# Patient Record
Sex: MALE | Race: WHITE | NOT HISPANIC OR LATINO | Employment: FULL TIME | URBAN - METROPOLITAN AREA
[De-identification: names, ages, dates, MRNs, and addresses within clinical notes are randomized per-mention and may not be internally consistent; named-entity substitution may affect disease eponyms.]

---

## 2017-09-28 ENCOUNTER — OFFICE VISIT (OUTPATIENT)
Dept: FAMILY MEDICINE | Facility: CLINIC | Age: 57
End: 2017-09-28
Payer: COMMERCIAL

## 2017-09-28 VITALS
HEART RATE: 64 BPM | SYSTOLIC BLOOD PRESSURE: 114 MMHG | OXYGEN SATURATION: 97 % | HEIGHT: 72 IN | RESPIRATION RATE: 18 BRPM | WEIGHT: 224 LBS | BODY MASS INDEX: 30.34 KG/M2 | TEMPERATURE: 98 F | DIASTOLIC BLOOD PRESSURE: 79 MMHG

## 2017-09-28 DIAGNOSIS — R06.83 SNORING: ICD-10-CM

## 2017-09-28 DIAGNOSIS — E78.5 HYPERLIPIDEMIA LDL GOAL <130: ICD-10-CM

## 2017-09-28 DIAGNOSIS — N52.9 MALE ERECTILE DISORDER: ICD-10-CM

## 2017-09-28 DIAGNOSIS — Z11.59 NEED FOR HEPATITIS C SCREENING TEST: ICD-10-CM

## 2017-09-28 DIAGNOSIS — Z23 NEED FOR VACCINATION: ICD-10-CM

## 2017-09-28 DIAGNOSIS — Z12.5 SCREENING FOR PROSTATE CANCER: ICD-10-CM

## 2017-09-28 DIAGNOSIS — Z23 NEED FOR PROPHYLACTIC VACCINATION AND INOCULATION AGAINST INFLUENZA: ICD-10-CM

## 2017-09-28 DIAGNOSIS — Z00.00 ROUTINE GENERAL MEDICAL EXAMINATION AT A HEALTH CARE FACILITY: Primary | ICD-10-CM

## 2017-09-28 LAB
ALBUMIN SERPL-MCNC: 4.2 G/DL (ref 3.4–5)
ALP SERPL-CCNC: 84 U/L (ref 40–150)
ALT SERPL W P-5'-P-CCNC: 41 U/L (ref 0–70)
ANION GAP SERPL CALCULATED.3IONS-SCNC: 7 MMOL/L (ref 3–14)
AST SERPL W P-5'-P-CCNC: 21 U/L (ref 0–45)
BILIRUB SERPL-MCNC: 1.7 MG/DL (ref 0.2–1.3)
BUN SERPL-MCNC: 12 MG/DL (ref 7–30)
CALCIUM SERPL-MCNC: 9.3 MG/DL (ref 8.5–10.1)
CHLORIDE SERPL-SCNC: 104 MMOL/L (ref 94–109)
CHOLEST SERPL-MCNC: 254 MG/DL
CO2 SERPL-SCNC: 27 MMOL/L (ref 20–32)
CREAT SERPL-MCNC: 0.88 MG/DL (ref 0.66–1.25)
ERYTHROCYTE [DISTWIDTH] IN BLOOD BY AUTOMATED COUNT: 13.1 % (ref 10–15)
GFR SERPL CREATININE-BSD FRML MDRD: 88 ML/MIN/1.7M2
GLUCOSE SERPL-MCNC: 103 MG/DL (ref 70–99)
HCT VFR BLD AUTO: 47.2 % (ref 40–53)
HDLC SERPL-MCNC: 49 MG/DL
HGB BLD-MCNC: 16.3 G/DL (ref 13.3–17.7)
LDLC SERPL CALC-MCNC: 186 MG/DL
MCH RBC QN AUTO: 31.7 PG (ref 26.5–33)
MCHC RBC AUTO-ENTMCNC: 34.5 G/DL (ref 31.5–36.5)
MCV RBC AUTO: 92 FL (ref 78–100)
NONHDLC SERPL-MCNC: 205 MG/DL
PLATELET # BLD AUTO: 234 10E9/L (ref 150–450)
POTASSIUM SERPL-SCNC: 4.4 MMOL/L (ref 3.4–5.3)
PROT SERPL-MCNC: 7.4 G/DL (ref 6.8–8.8)
PSA SERPL-ACNC: 0.86 UG/L (ref 0–4)
RBC # BLD AUTO: 5.15 10E12/L (ref 4.4–5.9)
SODIUM SERPL-SCNC: 138 MMOL/L (ref 133–144)
TRIGL SERPL-MCNC: 96 MG/DL
WBC # BLD AUTO: 6 10E9/L (ref 4–11)

## 2017-09-28 PROCEDURE — 85027 COMPLETE CBC AUTOMATED: CPT | Performed by: INTERNAL MEDICINE

## 2017-09-28 PROCEDURE — 99386 PREV VISIT NEW AGE 40-64: CPT | Performed by: INTERNAL MEDICINE

## 2017-09-28 PROCEDURE — 36415 COLL VENOUS BLD VENIPUNCTURE: CPT | Performed by: INTERNAL MEDICINE

## 2017-09-28 PROCEDURE — 80061 LIPID PANEL: CPT | Performed by: INTERNAL MEDICINE

## 2017-09-28 PROCEDURE — 80053 COMPREHEN METABOLIC PANEL: CPT | Performed by: INTERNAL MEDICINE

## 2017-09-28 PROCEDURE — 90686 IIV4 VACC NO PRSV 0.5 ML IM: CPT | Performed by: INTERNAL MEDICINE

## 2017-09-28 PROCEDURE — G0103 PSA SCREENING: HCPCS | Performed by: INTERNAL MEDICINE

## 2017-09-28 PROCEDURE — 86803 HEPATITIS C AB TEST: CPT | Performed by: INTERNAL MEDICINE

## 2017-09-28 PROCEDURE — 90472 IMMUNIZATION ADMIN EACH ADD: CPT | Performed by: INTERNAL MEDICINE

## 2017-09-28 PROCEDURE — 90471 IMMUNIZATION ADMIN: CPT | Performed by: INTERNAL MEDICINE

## 2017-09-28 PROCEDURE — 90714 TD VACC NO PRESV 7 YRS+ IM: CPT | Performed by: INTERNAL MEDICINE

## 2017-09-28 RX ORDER — SILDENAFIL CITRATE 20 MG/1
20 TABLET ORAL DAILY PRN
Qty: 12 TABLET | Refills: 11 | Status: SHIPPED | OUTPATIENT
Start: 2017-09-28 | End: 2017-12-14

## 2017-09-28 NOTE — MR AVS SNAPSHOT
After Visit Summary   9/28/2017    Darrell Regalado    MRN: [id number]           Patient Information     Date Of Birth          1960        Visit Information        Provider Department      9/28/2017 9:30 AM Mike Mathur MD [facility]        Today's Diagnoses     Routine general medical examination at a health care facility    -  1    Male erectile disorder        Snoring        Need for prophylactic vaccination and inoculation against influenza        Need for hepatitis C screening test        Screening for prostate cancer          Care Instructions      Preventive Health Recommendations  Male Ages 50 - 64    Yearly exam:             See your health care provider every year in order to  o   Review health changes.   o   Discuss preventive care.    o   Review your medicines if your doctor has prescribed any.     Have a cholesterol test every 5 years, or more frequently if you are at risk for high cholesterol/heart disease.     Have a diabetes test (fasting glucose) every three years. If you are at risk for diabetes, you should have this test more often.     Have a colonoscopy at age 50, or have a yearly FIT test (stool test). These exams will check for colon cancer.      Talk with your health care provider about whether or not a prostate cancer screening test (PSA) is right for you.    You should be tested each year for STDs (sexually transmitted diseases), if you re at risk.     Shots: Get a flu shot each year. Get a tetanus shot every 10 years.     Nutrition:    Eat at least 5 servings of fruits and vegetables daily.     Eat whole-grain bread, whole-wheat pasta and brown rice instead of white grains and rice.     Talk to your provider about Calcium and Vitamin D.     Lifestyle    Exercise for at least 150 minutes a week (30 minutes a day, 5 days a week). This will help you control your weight and prevent disease.     Limit alcohol to one drink per day.     No smoking.     Wear  sunscreen to prevent skin cancer.     See your dentist every six months for an exam and cleaning.     See your eye doctor every 1 to 2 years.            Follow-ups after your visit        Additional Services     SLEEP EVALUATION & MANAGEMENT REFERRAL - ADULT       Please be aware that coverage of these services is subject to the terms and limitations of your health insurance plan.  Call member services at your health plan with any benefit or coverage questions.      Please bring the following to your appointment:    >>   List of current medications   >>   This referral request   >>   Any documents/labs given to you for this referral    [facility] [phone] (Age 18 and up)                  Follow-up notes from your care team     Return in about 1 year (around 9/28/2018) for Physical Exam.      Future tests that were ordered for you today     Open Future Orders        Priority Expected Expires Ordered    SLEEP EVALUATION & MANAGEMENT REFERRAL - ADULT Routine  9/28/2018 9/28/2017            Who to contact     If you have questions or need follow up information about today's clinic visit or your schedule please contact [facility] directly at [phone].  Normal or non-critical lab and imaging results will be communicated to you by [employer], letter or phone within 4 business days after the clinic has received the results. If you do not hear from us within 7 days, please contact the clinic through [employer] or phone. If you have a critical or abnormal lab result, we will notify you by phone as soon as possible.  Submit refill requests through [employer] or call your pharmacy and they will forward the refill request to us. Please allow 3 business days for your refill to be completed.          Additional Information About Your Visit        [employer] Information     [employer] lets you send messages to your doctor, view your test results, renew your prescriptions, schedule appointments and more. To  "sign up, go to [email or web address] . Click on \"Log in\" on the left side of the screen, which will take you to the Welcome page. Then click on \"Sign up Now\" on the right side of the page.     You will be asked to enter the access code listed below, as well as some personal information. Please follow the directions to create your username and password.     Your access code is: [email or web address]  Expires: 2017 10:04 AM     Your access code will  in 90 days. If you need help or a new code, please call your [place] clinic or [phone].        Care EveryWhere ID     This is your Care EveryWhere ID. This could be used by other organizations to access your [facility] medical records  [id number]        Your Vitals Were     Pulse Temperature Respirations Height Pulse Oximetry BMI (Body Mass Index)    64 98  F (36.7  C) (Oral) 18 6' (1.829 m) 97% 30.38 kg/m2       Blood Pressure from Last 3 Encounters:   17 114/79   05/07/15 110/68   14 120/76    Weight from Last 3 Encounters:   17 224 lb (101.6 kg)   05/07/15 209 lb (94.8 kg)   14 233 lb (105.7 kg)              We Performed the Following     CBC with platelets     Comprehensive metabolic panel     FLU VAC, SPLIT VIRUS IM > 3 YO (QUADRIVALENT) [80982]     Hepatitis C Screen Reflex to HCV RNA Quant and Genotype     Lipid panel reflex to direct LDL     Prostate spec antigen screen     Vaccine Administration, Initial [70869]          Today's Medication Changes          These changes are accurate as of: 17 10:04 AM.  If you have any questions, ask your nurse or doctor.               Start taking these medicines.        Dose/Directions    sildenafil 20 MG tablet   Commonly known as:  REVATIO   Used for:  Male erectile disorder   Started by:  Mike Mathur MD        Dose:  20 mg   Take 1 tablet (20 mg) by mouth daily as needed   Quantity:  12 tablet   Refills:  11         Stop taking these medicines if you haven't already. Please " contact your care team if you have questions.     sildenafil 50 MG tablet   Commonly known as:  VIAGRA   Stopped by:  Mike Mathur MD                Where to get your medicines      Some of these will need a paper prescription and others can be bought over the counter.  Ask your nurse if you have questions.     Bring a paper prescription for each of these medications     sildenafil 20 MG tablet                Primary Care Provider Office Phone # Fax #    Mike Mathur [phone]       [facility] [address] [place] [address] [address]  [facility] [address]        Equal Access to Services     [facility]: Hadii aad ku hadasho Soomaali, waaxda luqadaha, qaybta kaalmada adeegyada, waxay idiin hayaan ademeliza moore . So [facility] [phone].    ATENCIÓN: Si habla español, tiene a cartagena disposición servicios gratuitos de asistencia lingüística. [facility] [phone].    We comply with applicable federal civil rights laws and [place] laws. We do not discriminate on the basis of race, color, national origin, age, disability sex, sexual orientation or gender identity.            Thank you!     Thank you for choosing [facility]  for your care. Our goal is always to provide you with excellent care. Hearing back from our patients is one way we can continue to improve our services. Please take a few minutes to complete the written survey that you may receive in the mail after your visit with us. Thank you!             Your Updated Medication List - Protect others around you: Learn how to safely use, store and throw away your medicines at www.disposemymeds.org.          This list is accurate as of: 9/28/17 10:04 AM.  Always use your most recent med list.                   Brand Name Dispense Instructions for use Diagnosis    sildenafil 20 MG tablet    REVATIO    12 tablet    Take 1 tablet (20 mg) by mouth daily as needed    Male erectile disorder

## 2017-09-28 NOTE — PROGRESS NOTES
SUBJECTIVE:   CC: Darrell Regalado is an 57 year old male who presents for preventative health visit.     Healthy Habits:    Do you get at least three servings of calcium containing foods daily (dairy, green leafy vegetables, etc.)? yes    Amount of exercise or daily activities, outside of work: walking    Problems taking medications regularly No    Medication side effects: No    Have you had an eye exam in the past two years? yes    Do you see a dentist twice per year? yes    Do you have sleep apnea, excessive snoring or daytime drowsiness?YES-Excessive snoring          Today's PHQ-2 Score: PHQ-2 ( 1999 Pfizer) 9/28/2017 5/7/2015   Q1: Little interest or pleasure in doing things 0 0   Q2: Feeling down, depressed or hopeless 0 0   PHQ-2 Score 0 0   Q1: Little interest or pleasure in doing things - Not at all   Q2: Feeling down, depressed or hopeless - Not at all   PHQ-2 Score - 0       Abuse: Current or Past(Physical, Sexual or Emotional)- No  Do you feel safe in your environment - Yes    Social History   Substance Use Topics     Smoking status: Never Smoker     Smokeless tobacco: Never Used     Alcohol use No     The patient does not drink >3 drinks per day nor >7 drinks per week.    Last PSA:   PSA   Date Value Ref Range Status   05/07/2015 0.68 0 - 4 ug/L Final       Reviewed orders with patient. Reviewed health maintenance and updated orders accordingly - Yes  Patient Active Problem List   Diagnosis     Other unspecified back disorder     Lumbago     Nonallopathic lesion of sacral region     CARDIOVASCULAR SCREENING; LDL GOAL LESS THAN 160     Past Surgical History:   Procedure Laterality Date     ORTHOPEDIC SURGERY      finger fracture repair        Social History   Substance Use Topics     Smoking status: Never Smoker     Smokeless tobacco: Never Used     Alcohol use No      Comment: sober since 2006     Family History   Problem Relation Age of Onset     Hypertension Mother      Breast Cancer Mother       DIABETES Father      Cancer - colorectal No family hx of      Prostate Cancer No family hx of          Current Outpatient Prescriptions   Medication Sig Dispense Refill     sildenafil (REVATIO) 20 MG tablet Take 1 tablet (20 mg) by mouth daily as needed 12 tablet 11     No Known Allergies      Reviewed and updated as needed this visit by clinical staffTobacco  Allergies  Meds  Med Hx  Surg Hx  Fam Hx  Soc Hx        Reviewed and updated as needed this visit by Provider        Past Medical History:   Diagnosis Date     Alcohol abuse, unspecified     stopped drinking 2006     Other unspecified back disorder     low back pain      Past Surgical History:   Procedure Laterality Date     ORTHOPEDIC SURGERY      finger fracture repair        ROS:  A 12 organ systems ROS is negative    OBJECTIVE:   /79 (BP Location: Right arm, Patient Position: Chair, Cuff Size: Adult Large)  Pulse 64  Temp 98  F (36.7  C) (Oral)  Resp 18  Ht 6' (1.829 m)  Wt 224 lb (101.6 kg)  SpO2 97%  BMI 30.38 kg/m2  EXAM:  GENERAL: healthy, alert and no distress  EYES: Eyes grossly normal to inspection, PERRL and conjunctivae and sclerae normal  HENT: ear canals and TM's normal, nose and mouth without ulcers or lesions  NECK: no adenopathy, no asymmetry, masses, or scars and thyroid normal to palpation  RESP: lungs clear to auscultation - no rales, rhonchi or wheezes  CV: regular rate and rhythm, normal S1 S2, no S3 or S4, no murmur, click or rub, no peripheral edema and peripheral pulses strong  ABDOMEN: soft, nontender, no hepatosplenomegaly, no masses and bowel sounds normal  RECTAL: normal sphincter tone, no rectal masses, prostate normal size, smooth, nontender without nodules or masses  MS: no gross musculoskeletal defects noted, no edema  SKIN: no suspicious lesions or rashes  NEURO: Normal strength and tone, mentation intact and speech normal  PSYCH: mentation appears normal, affect normal/bright    ASSESSMENT/PLAN:   1.  Routine general medical examination at a health care facility    - Comprehensive metabolic panel  - CBC with platelets  - Lipid panel reflex to direct LDL    2. Male erectile disorder    - sildenafil (REVATIO) 20 MG tablet; Take 1 tablet (20 mg) by mouth daily as needed  Dispense: 12 tablet; Refill: 11    3. Snoring    - SLEEP EVALUATION & MANAGEMENT REFERRAL - ADULT; Future    4. Need for prophylactic vaccination and inoculation against influenza  Flu shot today   - FLU VAC, SPLIT VIRUS IM > 3 YO (QUADRIVALENT) [35662]  - Vaccine Administration, Initial [82655]    5. Need for hepatitis C screening test    - Hepatitis C Screen Reflex to HCV RNA Quant and Genotype    6. Screening for prostate cancer    - Prostate spec antigen screen    COUNSELING:  Reviewed preventive health counseling, as reflected in patient instructions  Special attention given to:        Regular exercise       Healthy diet/nutrition       Immunizations    Vaccinated for: Influenza and Td           Consider Hep C screening for patients born between 1945 and 1965       Colon cancer screening       Prostate cancer screening             reports that he has never smoked. He has never used smokeless tobacco.      Estimated body mass index is 30.38 kg/(m^2) as calculated from the following:    Height as of this encounter: 6' (1.829 m).    Weight as of this encounter: 224 lb (101.6 kg).   Weight management plan: Discussed healthy diet and exercise guidelines and patient will follow up in 12 months in clinic to re-evaluate.    Counseling Resources:  ATP IV Guidelines  Pooled Cohorts Equation Calculator  FRAX Risk Assessment  ICSI Preventive Guidelines  Dietary Guidelines for Americans, 2010  USDA's MyPlate  ASA Prophylaxis  Lung CA Screening    Mike Mathur MD  [facility]  Injectable Influenza Immunization Documentation    1.  Is the person to be vaccinated sick today?   No    2. Does the person to be vaccinated have an allergy to a  component   of the vaccine?   No    3. Has the person to be vaccinated ever had a serious reaction   to influenza vaccine in the past?   No    4. Has the person to be vaccinated ever had Guillain-Barré syndrome?   No    Form completed by Patient

## 2017-09-28 NOTE — NURSING NOTE
Screening Questionnaire for Adult Immunization    Are you sick today?   No   Do you have allergies to medications, food, a vaccine component or latex?   No   Have you ever had a serious reaction after receiving a vaccination?   No   Do you have a long-term health problem with heart disease, lung disease, asthma, kidney disease, metabolic disease (e.g. diabetes), anemia, or other blood disorder?   No   Do you have cancer, leukemia, HIV/AIDS, or any other immune system problem?   No   In the past 3 months, have you taken medications that affect  your immune system, such as prednisone, other steroids, or anticancer drugs; drugs for the treatment of rheumatoid arthritis, Crohn s disease, or psoriasis; or have you had radiation treatments?   No   Have you had a seizure, or a brain or other nervous system problem?   No   During the past year, have you received a transfusion of blood or blood     products, or been given immune (gamma) globulin or antiviral drug?   No   For women: Are you pregnant or is there a chance you could become        pregnant during the next month?   No   Have you received any vaccinations in the past 4 weeks?   No     Immunization questionnaire answers were all negative.        Per orders of Dr. Mathur , injection of TD given by Jovanna Amin. Patient instructed to remain in clinic for 15 minutes afterwards, and to report any adverse reaction to me immediately.       Screening performed by Jovanna Amin on 9/28/2017 at 10:31 AM.    Prior to injection verified patient identity using patient's name and date of birth.    Chief Complaint   Patient presents with     Physical     Fasting       Initial /79 (BP Location: Right arm, Patient Position: Chair, Cuff Size: Adult Large)  Pulse 64  Temp 98  F (36.7  C) (Oral)  Resp 18  Ht 6' (1.829 m)  Wt 224 lb (101.6 kg)  SpO2 97%  BMI 30.38 kg/m2 Estimated body mass index is 30.38 kg/(m^2) as calculated from the following:    Height as of  this encounter: 6' (1.829 m).    Weight as of this encounter: 224 lb (101.6 kg).  Medication Reconciliation: complete   Jovanna Amin CMA (AAMA)

## 2017-09-28 NOTE — LETTER
"[facility]  [address] [facility]  [address]  [place]  [address]  Tel: [phone]    October 2, 2017    Darrell Regalado  [address] [facility] [address]        Dear Mr. Regalado,    -Your hepatitis C screening test is negative.  You do not have hepatitis C.     -Liver and gallbladder tests are normal for you. (ALT,AST, Alk phos, bilirubin(mild bilirubin elevation is a normal finding in many healthy fasting patients), kidney function is normal for you (Creatinine, GFR), Sodium is normal, Potassium is normal for you, Calcium is normal for you, Glucose (blood sugar) is essentially normal for you.       -Your total cholesterol is 254 which is above your goal of total cholesterol less than 200.     -Your triglycerides are 96 which are above your goal of triglycerides less than 150.     -Your HDL or \"good cholesterol\" is 49 which is at your goal of HDL cholesterol greater than 40.     -Your LDL cholesterol or \"bad cholesterol\" is 186 which is above your goal of LDL cholesterol less than <160.  Your LDL goal is based on your risk factors for artery disease.     -Your prostate specific antigen (PSA) test result returned normal.   -Your complete blood counts including your hemoglobin returned normal for you.         Bottom line:  Your cholesterol is quite high.  You are on the borderline of requiring a medication to lower cholesterol.  I would like to give you 6-12 months to work on your diet to get your cholesterol down.  You can reduce your total and LDL cholesterol levels by eating less saturated fats.  This means you should eat less fried foods and meat.  If you eat meat, you should try to eat more chicken and fish and less beef or pork.  Also, you should increase dietary fiber intake by eating more fruits, vegetables and whole grains.  A diet high in fiber reduces total and LDL cholesterol levels. If when we recheck in 6-12 months your cholesterol is not improved, we should consider starting " atorvastatin (Lipitor) to lower your cholesterol and risk for heart attack etc.         Follow up:  Lab appointment in 6 months for fasting lipid panel.       Sincerely,   Dr. Mathur /NB    Results for orders placed or performed in visit on 09/28/17   Comprehensive metabolic panel   Result Value Ref Range    Sodium 138 133 - 144 mmol/L    Potassium 4.4 3.4 - 5.3 mmol/L    Chloride 104 94 - 109 mmol/L    Carbon Dioxide 27 20 - 32 mmol/L    Anion Gap 7 3 - 14 mmol/L    Glucose 103 (H) 70 - 99 mg/dL    Urea Nitrogen 12 7 - 30 mg/dL    Creatinine 0.88 0.66 - 1.25 mg/dL    GFR Estimate 88 >60 mL/min/1.7m2    GFR Estimate If Black >90 >60 mL/min/1.7m2    Calcium 9.3 8.5 - 10.1 mg/dL    Bilirubin Total 1.7 (H) 0.2 - 1.3 mg/dL    Albumin 4.2 3.4 - 5.0 g/dL    Protein Total 7.4 6.8 - 8.8 g/dL    Alkaline Phosphatase 84 40 - 150 U/L    ALT 41 0 - 70 U/L    AST 21 0 - 45 U/L   CBC with platelets   Result Value Ref Range    WBC 6.0 4.0 - 11.0 10e9/L    RBC Count 5.15 4.4 - 5.9 10e12/L    Hemoglobin 16.3 13.3 - 17.7 g/dL    Hematocrit 47.2 40.0 - 53.0 %    MCV 92 78 - 100 fl    MCH 31.7 26.5 - 33.0 pg    MCHC 34.5 31.5 - 36.5 g/dL    RDW 13.1 10.0 - 15.0 %    Platelet Count 234 150 - 450 10e9/L   Lipid panel reflex to direct LDL   Result Value Ref Range    Cholesterol 254 (H) <200 mg/dL    Triglycerides 96 <150 mg/dL    HDL Cholesterol 49 >39 mg/dL    LDL Cholesterol Calculated 186 (H) <100 mg/dL    Non HDL Cholesterol 205 (H) <130 mg/dL   Prostate spec antigen screen   Result Value Ref Range    PSA 0.86 0 - 4 ug/L   Hepatitis C Screen Reflex to HCV RNA Quant and Genotype   Result Value Ref Range    Hepatitis C Antibody Nonreactive NR^Nonreactive

## 2017-09-29 LAB — HCV AB SERPL QL IA: NONREACTIVE

## 2017-10-01 NOTE — PROGRESS NOTES
"The following letter pertains to your most recent diagnostic tests:    -Your hepatitis C screening test is negative.  You do not have hepatitis C.     -Liver and gallbladder tests are normal for you. (ALT,AST, Alk phos, bilirubin(mild bilirubin elevation is a normal finding in many healthy fasting patients), kidney function is normal for you (Creatinine, GFR), Sodium is normal, Potassium is normal for you, Calcium is normal for you, Glucose (blood sugar) is essentially normal for you.      -Your total cholesterol is 254 which is above your goal of total cholesterol less than 200.    -Your triglycerides are 96 which are above your goal of triglycerides less than 150.    -Your HDL or \"good cholesterol\" is 49 which is at your goal of HDL cholesterol greater than 40.    -Your LDL cholesterol or \"bad cholesterol\" is 186 which is above your goal of LDL cholesterol less than <160.  Your LDL goal is based on your risk factors for artery disease.     -Your prostate specific antigen (PSA) test result returned normal.   -Your complete blood counts including your hemoglobin returned normal for you.         Bottom line:  Your cholesterol is quite high.  You are on the borderline of requiring a medication to lower cholesterol.  I would like to give you 6-12 months to work on your diet to get your cholesterol down.  You can reduce your total and LDL cholesterol levels by eating less saturated fats.  This means you should eat less fried foods and meat.  If you eat meat, you should try to eat more chicken and fish and less beef or pork.  Also, you should increase dietary fiber intake by eating more fruits, vegetables and whole grains.  A diet high in fiber reduces total and LDL cholesterol levels. If when we recheck in 6-12 months your cholesterol is not improved, we should consider starting atorvastatin (Lipitor) to lower your cholesterol and risk for heart attack etc.        Follow up:  Lab appointment in 6 months for fasting " lipid panel.        Sincerely,    Dr. Mathur    The 10-year ASCVD risk score (Evi DAVIDSON Jr, et al., 2013) is: 7.7%    Values used to calculate the score:      Age: 57 years      Sex: Male      Is Non- : No      Diabetic: No      Tobacco smoker: No      Systolic Blood Pressure: 114 mmHg      Is BP treated: No      HDL Cholesterol: 49 mg/dL      Total Cholesterol: 254 mg/dL

## 2017-10-11 ENCOUNTER — OFFICE VISIT (OUTPATIENT)
Dept: SLEEP MEDICINE | Facility: CLINIC | Age: 57
End: 2017-10-11
Attending: INTERNAL MEDICINE
Payer: COMMERCIAL

## 2017-10-11 VITALS
SYSTOLIC BLOOD PRESSURE: 132 MMHG | HEIGHT: 72 IN | BODY MASS INDEX: 31.45 KG/M2 | HEART RATE: 63 BPM | DIASTOLIC BLOOD PRESSURE: 80 MMHG | OXYGEN SATURATION: 98 % | WEIGHT: 232.2 LBS | RESPIRATION RATE: 18 BRPM

## 2017-10-11 DIAGNOSIS — G47.9 SLEEP DISTURBANCE: ICD-10-CM

## 2017-10-11 DIAGNOSIS — R53.83 FATIGUE, UNSPECIFIED TYPE: ICD-10-CM

## 2017-10-11 DIAGNOSIS — R06.83 SNORING: Primary | ICD-10-CM

## 2017-10-11 PROCEDURE — 99244 OFF/OP CNSLTJ NEW/EST MOD 40: CPT | Performed by: INTERNAL MEDICINE

## 2017-10-11 NOTE — PROGRESS NOTES
Sleep Consultation:    Date on this visit: 10/11/2017    Darrell Regalado is sent by Mike Mathur for a sleep consultation regarding sleep apnea.    Primary Physician: Mike Mathur     Chief complaint: snoring     Presenting history:     Darrell Regalado reports nightly snoring for 30 years.     Darrell does snore every night. Patient does have a regular bed partner. There is report of snoring and poor quality of sleep.  He does not have witnessed apneas. They never sleep separately.  Patient sleeps on his back, side and stomach. He denies snort arousals, morning dry mouth, morning headaches, morning confusion and restless legs.     Darrell denies any bruxism, sleep walking, sleep talking, dream enactment, sleep paralysis, cataplexy and hypnogogic/hypnopompic hallucinations.    Darrell wakes up feeling un-refreshed and has a restless sleep during the night. He has tiredness during the day. Patient's [place] Sleepiness score 9/24 consistent with no daytime sleepiness.      He drinks 15-20 cups of coffee daily with last coffee at 6 pm.     Darrell goes to sleep at 11:00 PM during the week. He wakes up at 6:00 AM with an alarm. He falls asleep in 15 minutes.  Darrell denies difficulty falling asleep.  He wakes up 5-8 times a night for 15 minutes before falling back to sleep.  Darrell wakes up to go to the bathroom and uncertain reasons.  On weekends, Darrell goes to sleep at 11:00 PM.  He wakes up at 8:00 AM without an alarm. He falls asleep in 15 minutes.  Patient gets an average of 6 hours of sleep per night.     Patient does not use electronics in bed and watch TV in bed.     Darrell does not do shift work.  He works day shifts from 8 am to 9 pm.      He denies sleep walking and sleep talking as a child.  Darrell denies difficulty breathing through his nose, claustrophobia, reflux at night and heartburn.      Darrell naps 0 times per week. He takes no inadvertant naps.  He denies closing eyes, dozing and falling asleep  while driving.       Allergies:    No Known Allergies    Medications:    Current Outpatient Prescriptions   Medication Sig Dispense Refill     sildenafil (REVATIO) 20 MG tablet Take 1 tablet (20 mg) by mouth daily as needed 12 tablet 11       Problem List:  Patient Active Problem List    Diagnosis Date Noted     CARDIOVASCULAR SCREENING; LDL GOAL LESS THAN 160 02/10/2010     Priority: Medium     Lumbago 02/25/2009     Priority: Medium     Nonallopathic lesion of sacral region 02/25/2009     Priority: Medium     Problem list name updated by automated process. Provider to review       Other unspecified back disorder      Priority: Medium     low back pain          Past Medical/Surgical History:  Past Medical History:   Diagnosis Date     Alcohol abuse, unspecified     stopped drinking 2006     Other unspecified back disorder     low back pain     Past Surgical History:   Procedure Laterality Date     ORTHOPEDIC SURGERY      finger fracture repair        Social History:  Social History     Social History     Marital status:      Spouse name: N/A     Number of children: N/A     Years of education: N/A     Occupational History     Not on file.     Social History Main Topics     Smoking status: Never Smoker     Smokeless tobacco: Never Used     Alcohol use No      Comment: sober since 2006     Drug use: No     Sexual activity: Yes     Partners: Female     Other Topics Concern     Parent/Sibling W/ Cabg, Mi Or Angioplasty Before 65f 55m? No     Social History Narrative       Family History:  Family History   Problem Relation Age of Onset     Hypertension Mother      Breast Cancer Mother      DIABETES Father      Cancer - colorectal No family hx of      Prostate Cancer No family hx of        Review of Systems:  A complete review of systems reviewed by me is negative with the exeption of what has been mentioned in the history of present illness.  CONSTITUTIONAL: NEGATIVE for weight gain/loss, fever, chills, sweats or  night sweats, drug allergies.  EYES: NEGATIVE for changes in vision, blind spots, double vision.  ENT: NEGATIVE for ear pain, sore throat, sinus pain, post-nasal drip, runny nose, bloody nose  CARDIAC: NEGATIVE for fast heartbeats or fluttering in chest, chest pain or pressure, breathlessness when lying flat, swollen legs or swollen feet.  NEUROLOGIC:  POSITIVE for  headaches  DERMATOLOGIC: NEGATIVE for rashes, new moles or change in mole(s)  PULMONARY: NEGATIVE SOB at rest, SOB with activity, dry cough, productive cough, coughing up blood, wheezing or whistling when breathing.    GASTROINTESTINAL: NEGATIVE for nausea or vomitting, loose or watery stools, fat or grease in stools, constipation, abdominal pain, bowel movements black in color or blood noted.  GENITOURINARY: NEGATIVE for pain during urination, blood in urine, urinating more frequently than usual, irregular menstrual periods.  MUSCULOSKELETAL:  POSITIVE for  muscle pain  ENDOCRINE: NEGATIVE for increased thirst or urination, diabetes.  LYMPHATIC: NEGATIVE for swollen lymph nodes, lumps or bumps in the breasts or nipple discharge.    Physical Examination:  Vitals: /80  Pulse 63  Resp 18  Ht 1.829 m (6')  Wt 105.3 kg (232 lb 3.2 oz)  SpO2 98%  BMI 31.49 kg/m2  BMI= Body mass index is 31.49 kg/(m^2).    Neck Cir (cm): 43 cm    [place] Total Score 10/11/2017   Total score - [place] 9       GENERAL APPEARANCE: healthy, alert and no distress  EYES: Eyes grossly normal to inspection, PERRL and conjunctivae and sclerae normal  HENT: nose and mouth without ulcers or lesions, oropharynx crowded and uvula elongated  NECK: no adenopathy, no asymmetry, masses, or scars and thyroid normal to palpation  RESP: lungs clear to auscultation - no rales, rhonchi or wheezes  CV: regular rates and rhythm, normal S1 S2, no S3 or S4 and no murmur, click or rub  ABDOMEN: Negative  MS: extremities normal- no gross deformities noted  NEURO: Normal strength and tone,  mentation intact and speech normal  PSYCH: mentation appears normal and affect normal/bright  Mallampati Class: IV.  Tonsillar Stage: 1  hidden by pillars.    Impression/Plan:    1. To rule out obstructive sleep apnea   2. Snoring   3. Fatigue    - Patient, 57 years old male, with BMI of 31 and neck circumference of 43 cm, presents with history of loud, snoring, poor sleep quality and daytime tiredness. Oropharynx is narrow one examination.      There is an intermediate to high risk for sleep apnea but probability of medium or severe sleep apnea is low. An in lab polysomnogram will be more sensitive in detecting sleep apnea.     Plan:     1. Split night PSG for assessment of sleep apnea       He will follow up with me in approximately two weeks after his sleep study has been competed to review the results and discuss plan of care.       Polysomnography reviewed.  Obstructive sleep apnea reviewed.  Complications of untreated sleep apnea were reviewed.    Marshall Catalan MD     CC: Mike Mathur

## 2017-10-11 NOTE — PATIENT INSTRUCTIONS

## 2017-10-11 NOTE — MR AVS SNAPSHOT
After Visit Summary   10/11/2017    Darrell Regalado    MRN: [id number]           Patient Information     Date Of Birth          1960        Visit Information        Provider Department      10/11/2017 8:30 AM Marshall Catalan MD [place] [facility]        Today's Diagnoses     Snoring    -  1    Fatigue, unspecified type        Sleep disturbance          Care Instructions      Your BMI is Body mass index is 31.49 kg/(m^2).  Weight management is a personal decision.  If you are interested in exploring weight loss strategies, the following discussion covers the approaches that may be successful. Body mass index (BMI) is one way to tell whether you are at a healthy weight, overweight, or obese. It measures your weight in relation to your height.  A BMI of 18.5 to 24.9 is in the healthy range. A person with a BMI of 25 to 29.9 is considered overweight, and someone with a BMI of 30 or greater is considered obese. More than two-thirds of American adults are considered overweight or obese.  Being overweight or obese increases the risk for further weight gain. Excess weight may lead to heart disease and diabetes.  Creating and following plans for healthy eating and physical activity may help you improve your health.  Weight control is part of healthy lifestyle and includes exercise, emotional health, and healthy eating habits. Careful eating habits lifelong are the mainstay of weight control. Though there are significant health benefits from weight loss, long-term weight loss with diet alone may be very difficult to achieve- studies show long-term success with dietary management in less than 10% of people. Attaining a healthy weight may be especially difficult to achieve in those with severe obesity. In some cases, medications, devices and surgical management might be considered.  What can you do?  If you are overweight or obese and are interested in methods for weight loss, you should discuss this  with your provider.     Consider reducing daily calorie intake by 500 calories.     Keep a food journal.     Avoiding skipping meals, consider cutting portions instead.    Diet combined with exercise helps maintain muscle while optimizing fat loss. Strength training is particularly important for building and maintaining muscle mass. Exercise helps reduce stress, increase energy, and improves fitness. Increasing exercise without diet control, however, may not burn enough calories to loose weight.       Start walking three days a week 10-20 minutes at a time    Work towards walking thirty minutes five days a week     Eventually, increase the speed of your walking for 1-2 minutes at time    In addition, we recommend that you review healthy lifestyles and methods for weight loss available through the National Institutes of Health patient information sites:  http://win.niddk.nih.gov/publications/index.htm    And look into health and wellness programs that may be available through your health insurance provider, employer, local community center, or karen club.    Weight management plan: Patient was referred to their PCP to discuss a diet and exercise plan.            Follow-ups after your visit        Your next 10 appointments already scheduled     Nov 28, 2017  8:30 PM CST   PSG Split with BED 1 SH SLEEP   [facility] ([facility])    [address] [address]   [phone]            Dec 13, 2017  1:30 PM CST   Return Sleep Patient with Marshall Catalan MD   [facility] ([facility])    [address] [address]   [phone]              Future tests that were ordered for you today     Open Future Orders        Priority Expected Expires Ordered    Comprehensive Sleep Study Routine  4/9/2018 10/11/2017            Who to contact     If you have questions or need follow up information about  "today's clinic visit or your schedule please contact [facility] directly at [phone].  Normal or non-critical lab and imaging results will be communicated to you by MyChart, letter or phone within 4 business days after the clinic has received the results. If you do not hear from us within 7 days, please contact the clinic through ThingMagich[employer] or phone. If you have a critical or abnormal lab result, we will notify you by phone as soon as possible.  Submit refill requests through [employer] or call your pharmacy and they will forward the refill request to us. Please allow 3 business days for your refill to be completed.          Additional Information About Your Visit        ThingMagichart Information     [employer] lets you send messages to your doctor, view your test results, renew your prescriptions, schedule appointments and more. To sign up, go to [email or web address] . Click on \"Log in\" on the left side of the screen, which will take you to the Welcome page. Then click on \"Sign up Now\" on the right side of the page.     You will be asked to enter the access code listed below, as well as some personal information. Please follow the directions to create your username and password.     Your access code is: [email or web address]  Expires: 2017 10:04 AM     Your access code will  in 90 days. If you need help or a new code, please call your [place] clinic or [phone].        Care EveryWhere ID     This is your Care EveryWhere ID. This could be used by other organizations to access your [place] medical records  [id number]        Your Vitals Were     Pulse Respirations Height Pulse Oximetry BMI (Body Mass Index)       63 18 1.829 m (6') 98% 31.49 kg/m2        Blood Pressure from Last 3 Encounters:   10/11/17 132/80   17 114/79   05/07/15 110/68    Weight from Last 3 Encounters:   10/11/17 105.3 kg (232 lb 3.2 oz)   17 101.6 kg (224 lb)   05/07/15 94.8 kg (209 lb)              We Performed " the Following     SLEEP EVALUATION & MANAGEMENT REFERRAL - ADULT        Primary Care Provider Office Phone # Fax #    Mike Mathur [phone]       [place] [address] [address] [address]        Equal Access to Services     STEVEN MAUREEN : Hadii aad ku hadasho Soomaali, waaxda luqadaha, qaybta kaalmada adeegyada, waxay idiin hayaan adeeg kharash lajesus . So [facility] [phone].    ATENCIÓN: Si habla español, tiene a cartagena disposición servicios gratuitos de asistencia lingüística. Llame al [phone].    We comply with applicable federal civil rights laws and [place] laws. We do not discriminate on the basis of race, color, national origin, age, disability, sex, sexual orientation, or gender identity.            Thank you!     Thank you for choosing [facility]  for your care. Our goal is always to provide you with excellent care. Hearing back from our patients is one way we can continue to improve our services. Please take a few minutes to complete the written survey that you may receive in the mail after your visit with us. Thank you!             Your Updated Medication List - Protect others around you: Learn how to safely use, store and throw away your medicines at www.disposemymeds.org.          This list is accurate as of: 10/11/17  8:59 AM.  Always use your most recent med list.                   Brand Name Dispense Instructions for use Diagnosis    sildenafil 20 MG tablet    REVATIO    12 tablet    Take 1 tablet (20 mg) by mouth daily as needed    Male erectile disorder

## 2017-11-28 ENCOUNTER — THERAPY VISIT (OUTPATIENT)
Dept: SLEEP MEDICINE | Facility: CLINIC | Age: 57
End: 2017-11-28
Payer: COMMERCIAL

## 2017-11-28 DIAGNOSIS — R06.83 SNORING: ICD-10-CM

## 2017-11-28 DIAGNOSIS — R53.83 FATIGUE, UNSPECIFIED TYPE: ICD-10-CM

## 2017-11-28 DIAGNOSIS — G47.9 SLEEP DISTURBANCE: ICD-10-CM

## 2017-11-28 PROCEDURE — 95811 POLYSOM 6/>YRS CPAP 4/> PARM: CPT | Performed by: INTERNAL MEDICINE

## 2017-11-28 NOTE — MR AVS SNAPSHOT
After Visit Summary   11/28/2017    Darrell Regalado    MRN: [id number]           Patient Information     Date Of Birth          1960        Visit Information        Provider Department      11/28/2017 8:30 PM BED 1  SLEEP [facility]        Today's Diagnoses     Snoring        Sleep disturbance        Fatigue, unspecified type          Care Instructions    [facility]    1. Your sleep study will be reviewed by a sleep physician within the next few days.     2. Please follow up in the sleep clinic as scheduled, or, make an appointment with your sleep provider to be seen within two weeks to discuss the results of the sleep study.    3. If you have any questions or problems with your treatment plan, please contact your sleep clinic provider at [phone] to further manage your condition.    4. Please review your attached medication list, and, at your follow-up appointment advise your sleep clinic provider about any changes.    5. Go to [email or web address] for more information about your sleep problems.    MANOJ Damian  November 29, 2017                  Follow-ups after your visit        Your next 10 appointments already scheduled     Dec 13, 2017  1:30 PM CST   Return Sleep Patient with Marshall Catalan MD   [facility] ([facility])    [address] [place] [address] [phone]              Who to contact     If you have questions or need follow up information about today's clinic visit or your schedule please contact [facility] directly at [phone].  Normal or non-critical lab and imaging results will be communicated to you by MyChart, letter or phone within 4 business days after the clinic has received the results. If you do not hear from us within 7 days, please contact the clinic through MyChart or phone. If you have a critical or abnormal lab  "result, we will notify you by phone as soon as possible.  Submit refill requests through [email or web address] or call your pharmacy and they will forward the refill request to us. Please allow 3 business days for your refill to be completed.          Additional Information About Your Visit        [email or web address] Information     [email or web address] lets you send messages to your doctor, view your test results, renew your prescriptions, schedule appointments and more. To sign up, go to [email or web address] . Click on \"Log in\" on the left side of the screen, which will take you to the Welcome page. Then click on \"Sign up Now\" on the right side of the page.     You will be asked to enter the access code listed below, as well as some personal information. Please follow the directions to create your username and password.     Your access code is: [email or web address]  Expires: 2017  9:04 AM     Your access code will  in 90 days. If you need help or a new code, please call your [place] clinic or [phone].        Care EveryWhere ID     This is your Care EveryWhere ID. This could be used by other organizations to access your [place] medical records  [id number]         Blood Pressure from Last 3 Encounters:   10/11/17 132/80   17 114/79   05/07/15 110/68    Weight from Last 3 Encounters:   10/11/17 105.3 kg (232 lb 3.2 oz)   17 101.6 kg (224 lb)   05/07/15 94.8 kg (209 lb)              We Performed the Following     Comprehensive Sleep Study        Primary Care Provider Office Phone # Fax #    Mike Mathur [phone]       [facility] [address] [facility] [address]  [facility] [address]        Equal Access to Services     MANISHA REDDY : Erin Valverde, teresa cloud, lai kaalmada alejandro, ramona asencio. So [facility] [phone].    ATENCIÓN: Si habla español, tiene a cartagena disposición servicios gratuitos de asistencia lingüística. Llame al [phone].    We comply " with applicable federal civil rights laws and [place] laws. We do not discriminate on the basis of race, color, national origin, age, disability, sex, sexual orientation, or gender identity.            Thank you!     Thank you for choosing [place] [facility]  for your care. Our goal is always to provide you with excellent care. Hearing back from our patients is one way we can continue to improve our services. Please take a few minutes to complete the written survey that you may receive in the mail after your visit with us. Thank you!             Your Updated Medication List - Protect others around you: Learn how to safely use, store and throw away your medicines at www.disposemymeds.org.          This list is accurate as of: 11/28/17 11:59 PM.  Always use your most recent med list.                   Brand Name Dispense Instructions for use Diagnosis    sildenafil 20 MG tablet    REVATIO    12 tablet    Take 1 tablet (20 mg) by mouth daily as needed    Male erectile disorder

## 2017-11-29 NOTE — PROGRESS NOTES
Completed a split night PSG per provider order.    Preliminary AHI >20, >10 min lateral REM sleep, >10 supine sleep.  A final therapeutic PAP pressure was achieved.    Supine REM was seen on therapeutic pressure.    Patient reports feeling refreshed in AM.

## 2017-11-29 NOTE — PATIENT INSTRUCTIONS
[place] [facility]    1. Your sleep study will be reviewed by a sleep physician within the next few days.     2. Please follow up in the sleep clinic as scheduled, or, make an appointment with your sleep provider to be seen within two weeks to discuss the results of the sleep study.    3. If you have any questions or problems with your treatment plan, please contact your sleep clinic provider at [phone] to further manage your condition.    4. Please review your attached medication list, and, at your follow-up appointment advise your sleep clinic provider about any changes.    5. Go to [email or web address] for more information about your sleep problems.    Sonja Moreira, RPSGT  November 29, 2017

## 2017-11-30 PROBLEM — G47.33 OSA (OBSTRUCTIVE SLEEP APNEA): Status: ACTIVE | Noted: 2017-11-30

## 2017-11-30 NOTE — PROCEDURES
SLEEP STUDY INTERPRETATION  SPLIT-NIGHT STUDY      Patient: Darrell Regalado  YOB: 1960  Study Date: 11/28/2017  MRN: [id number]  Referring Provider: MD Mathur Jacob C.  Ordering Provider: MD Catalan Rakesh    Indications for Polysomnography: The patient is a 57 y old Male who is 6' and weighs 232.0 lbs.  His BMI is 31.8, [place] sleepiness scale is 9.0 and neck size is 43.0.  Relevant medical history includes snoring and fatigue.  A diagnostic polysomnogram was performed to evaluate for Sleep Apnea.  After 209.0 minutes of sleep time the patient exhibited sufficient respiratory events qualifying him for a CPAP trial which was then initiated.      Polysomnogram Data:  A full night polysomnogram recorded the standard physiologic parameters including EEG, EOG, EMG, ECG, nasal and oral airflow.  Respiratory parameters of chest and abdominal movements were recorded with respiratory inductance plethysmography.  Oxygen saturation was recorded by pulse oximetry.      Diagnostic PSG  Sleep Architecture: Fragmented sleep; reduced stage REM.   The total recording time of the diagnostic portion of the study was 229.4 minutes.  The total sleep time was 209.0 minutes.  During the diagnostic portion of the study the sleep latency was decreased at 3.9 minutes without the use of a sleep aid.  REM latency was 144.5 minutes.  Arousal index was increased at 60.9 arousals per hour.  Sleep efficiency was normal at 91.1%.  Wake after sleep onset was 9.5 minutes.   The patient spent 6.7% of total sleep time in Stage N1, 51.7% in Stage N2, 34.2% in Stage N3 and 7.4% in REM.       Respiration: Moderate obstructive sleep apnea, supine dependent.     Events - During the diagnostic portion of the study, the polysomnogram revealed a presence of 3 obstructive, 1 central, and - mixed apneas resulting in an apnea index of 1.1 events per hour.  There were 93 hypopneas resulting in a hypopnea index of 26.7 events per hour.  The  combined apnea/hypopnea index was 27.8 events per hour.  The REM AHI was - events per hour.  The supine AHI was 39.7 events per hour.  The RERA index was 4.6 events per hour.  The RDI was 32.4 events per hour.     Snoring - was reported as moderate to loud and supine positional.    Respiratory rate and pattern - was notable for normal respiratory rate and pattern.    Sustained Sleep Associated Hypoventilation - Transcutaneous carbon dioxide monitoring was not used; however significant hypoventilation was not suggested by oximetry.    Sleep Associated Hypoxemia - (Greater than 5 minutes O2 sat below 89%) was not present.  Baseline oxygen saturation was 95.5%. Lowest oxygen saturation was 87.7%.  Time spent less than or equal to 88% was - minutes.  Time spent less than or equal to 89% was - minutes.  32.4 4.6 27.8     Treatment PSG  Sleep Architecture: Improved with normalization of arousal frequency and REM rebound.   At 02:42:50 AM the patient was placed on CPAP treatment and was titrated at pressures ranging from 5 cmH2O up to 6 cmH2O.  The total recording time of the treatment portion of the study was 212.4 minutes.  The total sleep time was 178.0 minutes.  During the treatment portion of the study the sleep latency was 18.0 minutes.  REM latency was 48.5 minutes.  Arousal index was normal at 13.5 arousals per hour.  Sleep efficiency was normal/increased/decreased at 83.8%.  Wake after sleep onset was 16.0 minutes.  The patient spent 9.0% of total sleep time in Stage N1, 33.7% in Stage N2, 18.5% in Stage N3 and 38.8% in REM.       Respiration: CPAP effectively treated sleep apnea.   The optimal pressure was 6 cmH2O with an AHI of 6.5 events per hour.  Time in REM supine on final pressure was 22.5 minutes.   This titration was considered (choose one of the following):  - good (residual AHI < 10 events per hour or 50% decrease if baseline RDI > 15 events per hour, and including REM-supine sleep at final  pressure).    Movement Activity: Increased periodic limb movement activity seen in the diagnostic portion improved with CPAP titration.     Periodic Limb Movements  o During the diagnostic portion of the study, there were 93 PLMs recorded. The PLM index was 26.7 movements per hour.  The PLM Arousal Index was 14.6 per hour.  o During the treatment portion of the study, there were 28 PLMs recorded. The PLM index was 9.4 movements per hour.  The PLM Arousal Index was 0.7 per hour.    REM EMG Activity - Excessive transient / sustained muscle activity was not present.    Nocturnal Behavior - Abnormal sleep related behaviors were not noted during / arising out of NREM / REM sleep.      Bruxism - None apparent.    Cardiac Summary: Sinus rhythm with occasional premature beats.    During the diagnostic portion of the study, the average pulse rate was 50.4 bpm.  The minimum pulse rate was 39.0 bpm while the maximum pulse rate was 83.4 bpm.    During the treatment portion of the study, the average pulse rate was 47.5 bpm.  The minimum pulse rate was 40.9 bpm while the maximum pulse rate was 79.8 bpm.     Arrhythmias were noted.    Assessment:     This sleep study shows supine dependent sleep apnea with an apnea hypopnea index in the moderate range. Respiratory events exclusively occurred in the supine position. Respiratory events were associated with sleep fragmentation.     CPAP was effective in treating sleep apnea and improving sleep architecture.     Recommendations:    Treatment of RASHID with Auto-titrating PAP therapy with a range of 5- 8 cmH2O.  Recommend clinical follow up with sleep management team, including review of compliance measures.    If CPAP is not tolerated or accepted, patient may be a candidate for dental appliance through referral to Sleep Dentistry for the treatment of obstructive sleep apnea.    If devices are not acceptable or effective, patient may benefit from evaluation of possible surgical options  for the treatment of obstructive sleep apnea and/or socially disruptive snoring.  If he is interested, would recommend referral to specialized ENT-Sleep provider.    Weight management.     Pharmacologic therapy should be used for management of restless legs syndrome only if present and clinically indicated and not based on the presence of periodic limb movements alone.    Cardiac Comments: -  Diagnostic Codes:    Obstructive Sleep Apnea G47.33    Repetitive Intrusions Into Sleep F51.8         _________________________________   Marshall Catalan MD 11/30/2017

## 2017-12-13 ENCOUNTER — OFFICE VISIT (OUTPATIENT)
Dept: SLEEP MEDICINE | Facility: CLINIC | Age: 57
End: 2017-12-13
Payer: COMMERCIAL

## 2017-12-13 VITALS
HEIGHT: 72 IN | HEART RATE: 71 BPM | DIASTOLIC BLOOD PRESSURE: 88 MMHG | OXYGEN SATURATION: 97 % | RESPIRATION RATE: 18 BRPM | SYSTOLIC BLOOD PRESSURE: 135 MMHG | WEIGHT: 235.4 LBS | BODY MASS INDEX: 31.89 KG/M2

## 2017-12-13 DIAGNOSIS — G47.33 OSA (OBSTRUCTIVE SLEEP APNEA): Primary | ICD-10-CM

## 2017-12-13 PROCEDURE — 99213 OFFICE O/P EST LOW 20 MIN: CPT | Performed by: INTERNAL MEDICINE

## 2017-12-13 NOTE — PATIENT INSTRUCTIONS

## 2017-12-13 NOTE — PROGRESS NOTES
Sleep Study Follow-Up Visit:    Date on this visit: 12/13/2017    Darrell Regalado comes in today for follow-up of his sleep study done on 11/28/2017 [facility] for possible sleep apnea.    Sleep latency 3.9 minutes without Ambien.  REM achieved.   REM latency 144.5 minutes.  Sleep efficiency 91%. Total sleep time 209 minutes.    Sleep architecture:  Stage 1, 6.7% (5%), stage 2, 51.7% (45-55%), stage 3, 34.2% (15-20%), stage REM, 7.4% (20-25%).  AHI was 27.8, without significant desaturations. RDI 32.4.  REM AHI  -, consistent with no REM RASHID.  Supine AHI  39.7, consistent with severe SUPINE RASHID.  Periodic Limb Movement Index 26.7/hour.       CPAP titration:  Sleep latency 18 minutes.  REM latency 48.5 minutes.  Sleep efficiency 83.8%. Total sleep time 178 minutes. Sleep architecture:  Stage 1, 9% (5%), stage 2, 33.7% (45-55%), stage 3, 18.5% (15-20%), stage REM, 38.8% (20-25%).  CPAP was titrated to 6 cm with  elimination of apneas, hypopneas and desaturations. Supine REM was noted at this pressure.  Periodic Limb Movement Index 9.4/hour.       These findings were reviewed with patient.     Past medical/surgical history, family history, social history, medications and allergies were reviewed.      Problem List:  Patient Active Problem List    Diagnosis Date Noted     RASHID (obstructive sleep apnea) 11/30/2017     Priority: Medium     CARDIOVASCULAR SCREENING; LDL GOAL LESS THAN 160 02/10/2010     Priority: Medium     Lumbago 02/25/2009     Priority: Medium     Nonallopathic lesion of sacral region 02/25/2009     Priority: Medium     Problem list name updated by automated process. Provider to review       Other unspecified back disorder      Priority: Medium     low back pain          Impression/Plan:    Moderate Obstructive sleep apnea     - Patient was counseled regarding moderate sleep apnea, consequences of untreated disease and treatment options, Treatment efficacy of auto PAP  therapy, dental appliance and upper airway surgery was compared.     - Premature beats on EKG were discussed. Patient's brother  from an MI this year. He experienced some shortness of breath and chest discomfort last year. He was advised to follow up with primary care physician for a full EKG and considering cardiac evaluation.     Plan:     1. Start auto PAP therapy 5-10 cm h2O    He will follow up with me in about 7 week(s).     Fifteen minutes spent with patient, all of which were spent face-to-face counseling, consulting, coordinating plan of care.      Marshall Catalan MD     CC: Mike Mathur

## 2017-12-13 NOTE — NURSING NOTE
Chief Complaint   Patient presents with     Study Results     PSG f/u       Initial /77  Pulse 71  Resp 18  Ht 1.829 m (6')  Wt 106.8 kg (235 lb 6.4 oz)  SpO2 97%  BMI 31.93 kg/m2 Estimated body mass index is 31.93 kg/(m^2) as calculated from the following:    Height as of this encounter: 1.829 m (6').    Weight as of this encounter: 106.8 kg (235 lb 6.4 oz).  Medication Reconciliation: complete     ESS 12  Consuelo Skinner

## 2017-12-14 ENCOUNTER — OFFICE VISIT (OUTPATIENT)
Dept: FAMILY MEDICINE | Facility: CLINIC | Age: 57
End: 2017-12-14
Payer: COMMERCIAL

## 2017-12-14 VITALS
BODY MASS INDEX: 30.48 KG/M2 | OXYGEN SATURATION: 97 % | HEIGHT: 72 IN | DIASTOLIC BLOOD PRESSURE: 80 MMHG | WEIGHT: 225 LBS | HEART RATE: 76 BPM | TEMPERATURE: 97.2 F | SYSTOLIC BLOOD PRESSURE: 117 MMHG

## 2017-12-14 DIAGNOSIS — E78.5 HYPERLIPIDEMIA LDL GOAL <130: ICD-10-CM

## 2017-12-14 DIAGNOSIS — I25.9 CHEST PAIN DUE TO MYOCARDIAL ISCHEMIA, UNSPECIFIED ISCHEMIC CHEST PAIN TYPE: Primary | ICD-10-CM

## 2017-12-14 PROCEDURE — 93000 ELECTROCARDIOGRAM COMPLETE: CPT | Performed by: INTERNAL MEDICINE

## 2017-12-14 PROCEDURE — 99213 OFFICE O/P EST LOW 20 MIN: CPT | Performed by: INTERNAL MEDICINE

## 2017-12-14 RX ORDER — ATORVASTATIN CALCIUM 20 MG/1
20 TABLET, FILM COATED ORAL DAILY
Qty: 60 TABLET | Refills: 1 | Status: SHIPPED | OUTPATIENT
Start: 2017-12-14 | End: 2018-10-31

## 2017-12-14 NOTE — MR AVS SNAPSHOT
"              After Visit Summary   2017    Darrell Regalado    MRN: [id number]           Patient Information     Date Of Birth          1960        Visit Information        Provider Department      2017 4:30 PM Shahid Atwood MD [facility]        Today's Diagnoses     Chest pain due to myocardial ischemia, unspecified ischemic chest pain type (H)    -  1    Hyperlipidemia LDL goal <130           Follow-ups after your visit        Who to contact     If you have questions or need follow up information about today's clinic visit or your schedule please contact [facility] directly at [phone].  Normal or non-critical lab and imaging results will be communicated to you by [employer], letter or phone within 4 business days after the clinic has received the results. If you do not hear from us within 7 days, please contact the clinic through [employer] or phone. If you have a critical or abnormal lab result, we will notify you by phone as soon as possible.  Submit refill requests through Cam[employer] or call your pharmacy and they will forward the refill request to us. Please allow 3 business days for your refill to be completed.          Additional Information About Your Visit        MyChart Information     Cam[employer]Cam lets you send messages to your doctor, view your test results, renew your prescriptions, schedule appointments and more. To sign up, go to [email or web address] . Click on \"Log in\" on the left side of the screen, which will take you to the Welcome page. Then click on \"Sign up Now\" on the right side of the page.     You will be asked to enter the access code listed below, as well as some personal information. Please follow the directions to create your username and password.     Your access code is: [email or web address]  Expires: 4/15/2018 12:32 AM     Your access code will  in 90 days. If you need help or a new code, please call your [facility] or [phone].      "   Care EveryWhere ID     This is your Care EveryWhere ID. This could be used by other organizations to access your [facility] medical records  [id number]        Your Vitals Were     Pulse Temperature Height Pulse Oximetry BMI (Body Mass Index)       76 97.2  F (36.2  C) (Oral) 6' (1.829 m) 97% 30.52 kg/m2        Blood Pressure from Last 3 Encounters:   12/14/17 117/80   12/13/17 135/88   10/11/17 132/80    Weight from Last 3 Encounters:   12/14/17 225 lb (102.1 kg)   12/13/17 235 lb 6.4 oz (106.8 kg)   10/11/17 232 lb 3.2 oz (105.3 kg)              We Performed the Following     EKG 12-lead complete w/read - Clinics          Today's Medication Changes          These changes are accurate as of: 12/14/17 11:59 PM.  If you have any questions, ask your nurse or doctor.               Start taking these medicines.        Dose/Directions    atorvastatin 20 MG tablet   Commonly known as:  LIPITOR   Used for:  Hyperlipidemia LDL goal <130   Started by:  Shahid Atwood MD        Dose:  20 mg   Take 1 tablet (20 mg) by mouth daily   Quantity:  60 tablet   Refills:  1         Stop taking these medicines if you haven't already. Please contact your care team if you have questions.     sildenafil 20 MG tablet   Commonly known as:  REVATIO   Stopped by:  Shahid Atwood MD                Where to get your medicines      These medications were sent to [place] [address], [address]  [address], [facility] [address], [facility] [address]     Phone:  [phone]     atorvastatin 20 MG tablet                Primary Care Provider Office Phone # Fax #    Mike Mathur [phone]       [facility] [address]  [facility] [address]        Equal Access to Services     MANISHA REDDY : Erin payton Sodalia, waaxda luqadaha, qaybta kaalmada adeegyada, ramona asencio. So [facility] [phone].    ATENCIÓN: Si pino galdamez  cartagena disposición servicios gratuitos de asistencia lingüística. Yvette barber [phone].    We comply with applicable federal civil rights laws and [place] laws. We do not discriminate on the basis of race, color, national origin, age, disability, sex, sexual orientation, or gender identity.            Thank you!     Thank you for choosing [facility]  for your care. Our goal is always to provide you with excellent care. Hearing back from our patients is one way we can continue to improve our services. Please take a few minutes to complete the written survey that you may receive in the mail after your visit with us. Thank you!             Your Updated Medication List - Protect others around you: Learn how to safely use, store and throw away your medicines at www.disposemymeds.org.          This list is accurate as of: 12/14/17 11:59 PM.  Always use your most recent med list.                   Brand Name Dispense Instructions for use Diagnosis    atorvastatin 20 MG tablet    LIPITOR    60 tablet    Take 1 tablet (20 mg) by mouth daily    Hyperlipidemia LDL goal <130

## 2017-12-14 NOTE — PROGRESS NOTES
SUBJECTIVE:   Darrell Regalado is a 57 year old male who presents to clinic today for the following health issues:    Chief Complaint   Patient presents with     Symptoms     working at [employer] this summer episode of  SOB, tingling across shoulder blades and down arm.  Has had some friends experience heart attacks & daughter (RN) suggested he get checked out          New Patient/Transfer of Care        Problem list and histories reviewed & adjusted, as indicated.  Additional history: as documented    Current Outpatient Prescriptions   Medication Sig Dispense Refill     atorvastatin (LIPITOR) 20 MG tablet Take 1 tablet (20 mg) by mouth daily 60 tablet 1       Reviewed and updated as needed this visit by clinical staff       Reviewed and updated as needed this visit by Provider         ROS:  Constitutional, HEENT, cardiovascular, pulmonary, gi and gu systems are negative, except as otherwise noted.      OBJECTIVE:   /80 (Cuff Size: Adult Large)  Pulse 76  Temp 97.2  F (36.2  C) (Oral)  Ht 6' (1.829 m)  Wt 225 lb (102.1 kg)  SpO2 97%  BMI 30.52 kg/m2  Body mass index is 30.52 kg/(m^2).  GENERAL: healthy, alert and no distress  NECK: no adenopathy, no asymmetry, masses, or scars and thyroid normal to palpation  RESP: lungs clear to auscultation - no rales, rhonchi or wheezes  CV: regular rate and rhythm, normal S1 S2, no S3 or S4, no murmur, click or rub, no peripheral edema and peripheral pulses strong  ABDOMEN: soft, nontender, no hepatosplenomegaly, no masses and bowel sounds normal  MS: no gross musculoskeletal defects noted, no edema        ASSESSMENT/PLAN:             1. Chest pain due to myocardial ischemia, unspecified ischemic chest pain type (H)    - EKG 12-lead complete w/read - Clinics    2. Hyperlipidemia LDL goal <130    - atorvastatin (LIPITOR) 20 MG tablet; Take 1 tablet (20 mg) by mouth daily  Dispense: 60 tablet; Refill: 1        Shahid Atwood MD  [facility]

## 2017-12-14 NOTE — NURSING NOTE
Chief Complaint   Patient presents with     Symptoms     working at [employer] this summer episode of  SOB, tingling across shoulder blades and down arm.  Has had some friends experience heart attacks & daughter (RN) suggested he get checked out        Initial /87 (BP Location: Left arm, Cuff Size: Adult Large)  Pulse 76  Temp 97.2  F (36.2  C) (Oral)  Ht 6' (1.829 m)  Wt 225 lb (102.1 kg)  SpO2 97%  BMI 30.52 kg/m2 Estimated body mass index is 30.52 kg/(m^2) as calculated from the following:    Height as of this encounter: 6' (1.829 m).    Weight as of this encounter: 225 lb (102.1 kg).  Medication Reconciliation: complete

## 2017-12-27 ENCOUNTER — APPOINTMENT (OUTPATIENT)
Dept: SLEEP MEDICINE | Facility: CLINIC | Age: 57
End: 2017-12-27
Payer: COMMERCIAL

## 2018-01-31 ENCOUNTER — APPOINTMENT (OUTPATIENT)
Dept: SLEEP MEDICINE | Facility: CLINIC | Age: 58
End: 2018-01-31
Payer: COMMERCIAL

## 2018-03-28 ENCOUNTER — DOCUMENTATION ONLY (OUTPATIENT)
Dept: SLEEP MEDICINE | Facility: CLINIC | Age: 58
End: 2018-03-28

## 2018-03-28 DIAGNOSIS — G47.33 OBSTRUCTIVE SLEEP APNEA: Primary | ICD-10-CM

## 2018-03-28 DIAGNOSIS — G47.33 OSA (OBSTRUCTIVE SLEEP APNEA): ICD-10-CM

## 2018-03-28 NOTE — PROGRESS NOTES
PT CAME INTO [facility] STATING THAT HE FELT LIKE HE WASNT GETTING ENOUGH PRESSURE. I CHECKED HIS PRESSURES WITH THE MANOMETER. CHANGED AUTO TO CPAP OF 5 THEN 10 AND TURNED OFF RAMP, TO CHECK PRESSURES. PRESSURES DID NOT GO UP ALL THE WAY. I INFORMED PT THAT HE SHOULD CONSIDER GETTING A NEW MACHINE SINCE HE IS ELIGIBLE BECAUSE I COULD SEND IT IN FOR REPAIRS BUT IT MAY GET SENT BACK UNREPAIRED. PT UNDERSTOOD. CALLED PT'S INSURANCE TO GET INFO. PT'S RX . PENDED ORDER AND ROUTED MESSAGE TO DR. READ. I DISCUSSED PRICING WITH PT; RENTAL VS PURCHASE. PT WANTED TO THINK ABOUT IT AND DO SOME RESEARCH. I CAUGHT PT BEFORE HE LEFT TO ADJUST HIS SETTINGS BACK. I DECIDED TO PLAY AROUND WITH HIS SETTINGS AND RECHECK HIS PRESSURES. HIRAM CAUGHT ME AND INFORMED ME TO TURN OFF EPR. I FORGOT TO TURN OFF EPR WHICH WAS SET ON 2. TURNED IT OFF AND PRESSURES WENT TO 5-10. PT'S MASK WAS ON NASAL ULTRA. CHANGED IT TO NASAL. INFORMED PT THAT HIS PRESSURES ARE ACCURATE. INFORMED PT THAT I TURNED OFF HIS RAMP AND CHANGED HIS MASK TO NASAL. INFORMED PT TO GIVE IT A TRY. ASKED PT WHEN HE FEELS LIKE HE'S NOT GETTING ENOUGH AIR, HE STATED IT'S WHEN HE INHALES. I ASKED PT WHEN HE HAS A F/U WITH DR. READ, AND HE STATED HE ALWAYS CANCELS BECAUSE HE HASNT TOLERATED USING HIS PAP MACHINE. I INFORMED PT THAT IF HE CONTINUES TO FEEL LIKE HE'S NOT GETTING ENOUGH PRESSURE WHEN INHALING, HE MAY NEED TO GO TO BIPAP. I EXPLAINED BIPAP TO HIM; 2 DIFFERENT PRESSURES -- LOWER PRESSURE TO HELP KEEP HIS UPPER AIRWAY OPEN, AND A HIGHER PRESSURE TO HELP HIM TAKE A DEEP BREATH. BUT, I INFORMED HIM THAT HE WOULD NEED TO FURTHER DISCUSS IT WITH DR. READ. PT UNDERSTOOD. GAVE PT MY DIRECT NUMBER TO CALL ME IF HE HAS ANY FURTHER QUESTIONS OR CONCERNS. PT THANKED ME FOR ALL OF MY HELP.

## 2018-03-28 NOTE — Clinical Note
Deep Catalan,  Patient is here at [facility] in regards to checking his machine. He's using his dad's old machine. It's not giving accurate pressures and his rx has . Would you be willing to sign a new one for me to get him setup?  Thank you,  Sonia

## 2018-03-28 NOTE — Clinical Note
Hi Dr. Catalan,  I played around with patient's machine and his pressures are reading accurate now. So, please disregard order. Patient does state that he feels like he's not getting pressure when he's inhaling. I asked patient to give his machine a try again tonight to see if it's better. But, I did discuss bipap with him. But, would need to further discuss with you.  Thank you,  Sonia

## 2018-05-16 ENCOUNTER — OFFICE VISIT (OUTPATIENT)
Dept: SLEEP MEDICINE | Facility: CLINIC | Age: 58
End: 2018-05-16
Payer: COMMERCIAL

## 2018-05-16 VITALS
DIASTOLIC BLOOD PRESSURE: 70 MMHG | BODY MASS INDEX: 29.26 KG/M2 | OXYGEN SATURATION: 100 % | HEIGHT: 72 IN | RESPIRATION RATE: 16 BRPM | HEART RATE: 62 BPM | WEIGHT: 216 LBS | SYSTOLIC BLOOD PRESSURE: 117 MMHG

## 2018-05-16 DIAGNOSIS — G47.33 OSA (OBSTRUCTIVE SLEEP APNEA): ICD-10-CM

## 2018-05-16 PROCEDURE — 99213 OFFICE O/P EST LOW 20 MIN: CPT | Performed by: INTERNAL MEDICINE

## 2018-05-16 NOTE — MR AVS SNAPSHOT
After Visit Summary   5/16/2018    Darrell Regalado    MRN: [id number]           Patient Information     Date Of Birth          1960        Visit Information        Provider Department      5/16/2018 10:30 AM Marshall Catalan MD [facility] [place]        Today's Diagnoses     RASHID (obstructive sleep apnea)          Care Instructions      Your BMI is Body mass index is 29.29 kg/(m^2).  Weight management is a personal decision.  If you are interested in exploring weight loss strategies, the following discussion covers the approaches that may be successful. Body mass index (BMI) is one way to tell whether you are at a healthy weight, overweight, or obese. It measures your weight in relation to your height.  A BMI of 18.5 to 24.9 is in the healthy range. A person with a BMI of 25 to 29.9 is considered overweight, and someone with a BMI of 30 or greater is considered obese. More than two-thirds of American adults are considered overweight or obese.  Being overweight or obese increases the risk for further weight gain. Excess weight may lead to heart disease and diabetes.  Creating and following plans for healthy eating and physical activity may help you improve your health.  Weight control is part of healthy lifestyle and includes exercise, emotional health, and healthy eating habits. Careful eating habits lifelong are the mainstay of weight control. Though there are significant health benefits from weight loss, long-term weight loss with diet alone may be very difficult to achieve- studies show long-term success with dietary management in less than 10% of people. Attaining a healthy weight may be especially difficult to achieve in those with severe obesity. In some cases, medications, devices and surgical management might be considered.  What can you do?  If you are overweight or obese and are interested in methods for weight loss, you should discuss this with your provider.     Consider reducing  daily calorie intake by 500 calories.     Keep a food journal.     Avoiding skipping meals, consider cutting portions instead.    Diet combined with exercise helps maintain muscle while optimizing fat loss. Strength training is particularly important for building and maintaining muscle mass. Exercise helps reduce stress, increase energy, and improves fitness. Increasing exercise without diet control, however, may not burn enough calories to loose weight.       Start walking three days a week 10-20 minutes at a time    Work towards walking thirty minutes five days a week     Eventually, increase the speed of your walking for 1-2 minutes at time    In addition, we recommend that you review healthy lifestyles and methods for weight loss available through the National Institutes of Health patient information sites:  http://win.niddk.nih.gov/publications/index.htm    And look into health and wellness programs that may be available through your health insurance provider, employer, local community center, or karen club.    Weight management plan: Patient was referred to their PCP to discuss a diet and exercise plan.          Your Body mass index is 29.29 kg/(m^2).  Weight management is a personal decision.  If you are interested in exploring weight loss strategies, the following discussion covers the approaches that may be successful. Body mass index (BMI) is one way to tell whether you are at a healthy weight, overweight, or obese. It measures your weight in relation to your height.  A BMI of 18.5 to 24.9 is in the healthy range. A person with a BMI of 25 to 29.9 is considered overweight, and someone with a BMI of 30 or greater is considered obese. More than two-thirds of American adults are considered overweight or obese.  Being overweight or obese increases the risk for further weight gain. Excess weight may lead to heart disease and diabetes.  Creating and following plans for healthy eating and physical activity may  help you improve your health.  Weight control is part of healthy lifestyle and includes exercise, emotional health, and healthy eating habits. Careful eating habits lifelong are the mainstay of weight control. Though there are significant health benefits from weight loss, long-term weight loss with diet alone may be very difficult to achieve- studies show long-term success with dietary management in less than 10% of people. Attaining a healthy weight may be especially difficult to achieve in those with severe obesity. In some cases, medications, devices and surgical management might be considered.  What can you do?  If you are overweight or obese and are interested in methods for weight loss, you should discuss this with your provider.     Consider reducing daily calorie intake by 500 calories.     Keep a food journal.     Avoiding skipping meals, consider cutting portions instead.    Diet combined with exercise helps maintain muscle while optimizing fat loss. Strength training is particularly important for building and maintaining muscle mass. Exercise helps reduce stress, increase energy, and improves fitness. Increasing exercise without diet control, however, may not burn enough calories to loose weight.       Start walking three days a week 10-20 minutes at a time    Work towards walking thirty minutes five days a week     Eventually, increase the speed of your walking for 1-2 minutes at time    In addition, we recommend that you review healthy lifestyles and methods for weight loss available through the National Institutes of Health patient information sites:  http://win.niddk.nih.gov/publications/index.htm    And look into health and wellness programs that may be available through your health insurance provider, employer, local community center, or karen club.    Weight management plan: Patient was referred to their PCP to discuss a diet and exercise plan.          Follow-ups after your visit        Your next  "10 appointments already scheduled     May 15, 2019  9:30 AM CDT   Return Sleep Patient with Marshall Catalan MD   [facility] ([facility])    [address] [address] [phone]              Who to contact     If you have questions or need follow up information about today's clinic visit or your schedule please contact [facility] directly at [phone].  Normal or non-critical lab and imaging results will be communicated to you by MyChart, letter or phone within 4 business days after the clinic has received the results. If you do not hear from us within 7 days, please contact the clinic through [facility] or phone. If you have a critical or abnormal lab result, we will notify you by phone as soon as possible.  Submit refill requests through [employer] or call your pharmacy and they will forward the refill request to us. Please allow 3 business days for your refill to be completed.          Additional Information About Your Visit        Connectemhar[facility]reather Information     [employer] lets you send messages to your doctor, view your test results, renew your prescriptions, schedule appointments and more. To sign up, go to [email or web address] . Click on \"Log in\" on the left side of the screen, which will take you to the Welcome page. Then click on \"Sign up Now\" on the right side of the page.     You will be asked to enter the access code listed below, as well as some personal information. Please follow the directions to create your username and password.     Your access code is: [email or web address]  Expires: 2018 11:02 AM     Your access code will  in 90 days. If you need help or a new code, please call your [facility] clinic or [phone].        Care EveryWhere ID     This is your Care EveryWhere ID. This could be used by other organizations to access your [facility] medical records  [id number]        Your Vitals Were     Pulse Respirations " Height Pulse Oximetry BMI (Body Mass Index)       62 16 1.829 m (6') 100% 29.29 kg/m2        Blood Pressure from Last 3 Encounters:   05/16/18 117/70   12/14/17 117/80   12/13/17 135/88    Weight from Last 3 Encounters:   05/16/18 98 kg (216 lb)   12/14/17 102.1 kg (225 lb)   12/13/17 106.8 kg (235 lb 6.4 oz)              Today, you had the following     No orders found for display       Primary Care Provider Office Phone # Fax #    Mike Mathur [phone] [address]  [facility] [address]        Equal Access to Services     MANISHA REDDY : Hadii fidencio tyler haddelphineo Sodalia, waaxda luqadaha, qaybta kaalmada adeegyada, ramona moore . So [facility] [phone].    ATENCIÓN: Si habla español, tiene a cartagena disposición servicios gratuitos de asistencia lingüística. [facility] [phone].    We comply with applicable federal civil rights laws and [place] laws. We do not discriminate on the basis of race, color, national origin, age, disability, sex, sexual orientation, or gender identity.            Thank you!     Thank you for choosing [facility]  for your care. Our goal is always to provide you with excellent care. Hearing back from our patients is one way we can continue to improve our services. Please take a few minutes to complete the written survey that you may receive in the mail after your visit with us. Thank you!             Your Updated Medication List - Protect others around you: Learn how to safely use, store and throw away your medicines at www.disposemymeds.org.          This list is accurate as of 5/16/18 11:02 AM.  Always use your most recent med list.                   Brand Name Dispense Instructions for use Diagnosis    atorvastatin 20 MG tablet    LIPITOR    60 tablet    Take 1 tablet (20 mg) by mouth daily    Hyperlipidemia LDL goal <130

## 2018-05-16 NOTE — NURSING NOTE
Chief Complaint   Patient presents with     CPAP Follow Up     Follow up jd       Initial /70  Pulse 62  Resp 16  Ht 1.829 m (6')  Wt 98 kg (216 lb)  SpO2 100%  BMI 29.29 kg/m2 Estimated body mass index is 29.29 kg/(m^2) as calculated from the following:    Height as of this encounter: 1.829 m (6').    Weight as of this encounter: 98 kg (216 lb).    Medication Reconciliation: complete    ESS 6  Della Bocanegra MA

## 2018-05-16 NOTE — PROGRESS NOTES
Obstructive Sleep Apnea - PAP Follow-Up Visit:    Chief Complaint   Patient presents with     CPAP Follow Up     Follow up jd       Darrell Regalado comes in today for follow-up of their moderate sleep apnea, managed with CPAP.     PSG from 11/28/2017 showed an apnea hypopnea index of 28.7 per hour.     Overall, he rates the experience with PAP as 5 (0 poor, 10 great). The mask is comfortable. The mask is not leaking.  He is not snoring with the mask on. He is not having gasp arousals.  He is not having significant oral/nasal dryness. The pressure settings are comfortable.     He uses nasal mask.     Bedtime is typically 10:30 - 11 pm. Usually it takes about 10 minutes to fall asleep with the mask on. Wake time is typically 6 am.  Patient is using PAP therapy 5-6 hours per night. The patient is usually getting 7 hours of sleep per night.    He does not feel any significant changes in sleep quality or daytime alertness.    Total score - [place]: 6 (5/16/2018 10:42 AM)    ResMed     Auto-PAP 5-15 cmH2O download:  68/90 total days of use. 22 nonuse days. 56% days with >4 hours use.  Average use 5 hours 18 minutes per day. Median Leak 13 L/min. 95%ile Leak 33.8 L/min. CPAP 95% pressure 7.3cm. AHI 1.0      Reviewed by team: Allergies  Meds       Reviewed by provider:        Problem List:  Patient Active Problem List    Diagnosis Date Noted     JD (obstructive sleep apnea) 11/30/2017     Priority: Medium     CARDIOVASCULAR SCREENING; LDL GOAL LESS THAN 160 02/10/2010     Priority: Medium     Lumbago 02/25/2009     Priority: Medium     Nonallopathic lesion of sacral region 02/25/2009     Priority: Medium     Problem list name updated by automated process. Provider to review       Other unspecified back disorder      Priority: Medium     low back pain            /70  Pulse 62  Resp 16  Ht 1.829 m (6')  Wt 98 kg (216 lb)  SpO2 100%  BMI 29.29 kg/m2    Impression/Plan:     Moderate sleep apnea.     -  Tolerating PAP well. Daytime symptoms are improved. Normal AHI seen on download.     Plan:     1. Continue auto PAP therapy     Darrell Regalado will follow up in about 1 year(s).     Fifteen minutes spent with patient, all of which were spent face-to-face counseling, consulting, coordinating plan of care.      Marshall Catalan MD, MD    CC:  Mike Mathur,

## 2018-05-16 NOTE — PATIENT INSTRUCTIONS

## 2018-10-30 NOTE — PROGRESS NOTES
SUBJECTIVE:   CC: Darrell Regalado is an 58 year old male who presents for preventative health visit.     Healthy Habits:    Do you get at least three servings of calcium containing foods daily (dairy, green leafy vegetables, etc.)? yes    Amount of exercise or daily activities, outside of work: 7 day(s) per week    Problems taking medications regularly No    Medication side effects: No    Have you had an eye exam in the past two years? yes    Do you see a dentist twice per year? yes    Do you have sleep apnea, excessive snoring or daytime drowsiness?yes    Saw Dr. Birch in 12/17 reporting episode of WATERMAN occurring 6 months prior to that visit  Atorvastatin was prescribed, but patient stopped due to mild muscle side effects   No WATERMAN episodes since then [place]  He does not want to pursue a provocative stress test because he is currently without symptom      He describes a several year history of progressive pain in left knee that is now moderate  He avoids taking over the counter analgesics  He denies swelling, he has occasional catching of the joint that is mild     He has used viagra in the past without serious side effects and requests a refill today       Today's PHQ-2 Score:   PHQ-2 ( 1999 Pfizer) 10/31/2018 12/14/2017   Q1: Little interest or pleasure in doing things 0 0   Q2: Feeling down, depressed or hopeless 0 0   PHQ-2 Score 0 0   Q1: Little interest or pleasure in doing things - -   Q2: Feeling down, depressed or hopeless - -   PHQ-2 Score - -       Abuse: Current or Past(Physical, Sexual or Emotional)- No  Do you feel safe in your environment - Yes    Social History   Substance Use Topics     Smoking status: Never Smoker     Smokeless tobacco: Never Used     Alcohol use No      Comment: sober since 2006      If you drink alcohol do you typically have >3 drinks per day or >7 drinks per week? No                      Last PSA:   PSA   Date Value Ref Range Status    09/28/2017 0.86 0 - 4 ug/L Final     Comment:     Assay Method:  Chemiluminescence using Siemens Vista analyzer       Reviewed orders with patient. Reviewed health maintenance and updated orders accordingly - Yes  Patient Active Problem List   Diagnosis     Other unspecified back disorder     Lumbago     Nonallopathic lesion of sacral region     CARDIOVASCULAR SCREENING; LDL GOAL LESS THAN 160     RASHID (obstructive sleep apnea)     Past Surgical History:   Procedure Laterality Date     ORTHOPEDIC SURGERY      finger fracture repair        Social History   Substance Use Topics     Smoking status: Never Smoker     Smokeless tobacco: Never Used     Alcohol use No      Comment: sober since 2006     Family History   Problem Relation Age of Onset     Hypertension Mother      Breast Cancer Mother      Diabetes Father      Cancer - colorectal No family hx of      Prostate Cancer No family hx of          No current outpatient prescriptions on file.     Allergies   Allergen Reactions     Atorvastatin      Muscle aches         Reviewed and updated as needed this visit by clinical staff  Tobacco  Allergies  Meds  Soc Hx        Reviewed and updated as needed this visit by Provider        Past Medical History:   Diagnosis Date     Alcohol abuse, unspecified     stopped drinking 2006     Other unspecified back disorder     low back pain      Past Surgical History:   Procedure Laterality Date     ORTHOPEDIC SURGERY      finger fracture repair        ROS:  A 10 organ systems ROS is negative.     OBJECTIVE:   /75 (BP Location: Right arm, Patient Position: Chair, Cuff Size: Adult Regular)  Pulse 57  Temp 97.4  F (36.3  C) (Tympanic)  Ht 6' (1.829 m)  Wt 233 lb (105.7 kg)  SpO2 97%  BMI 31.6 kg/m2  EXAM:  GENERAL: healthy, alert and no distress  EYES: Eyes grossly normal to inspection, PERRL and conjunctivae and sclerae normal  HENT: ear canals and TM's normal, nose and mouth without ulcers or lesions  NECK: no  adenopathy, no asymmetry, masses, or scars and thyroid normal to palpation  RESP: lungs clear to auscultation - no rales, rhonchi or wheezes  CV: regular rate and rhythm, normal S1 S2, no S3 or S4, no murmur, click or rub, no peripheral edema and peripheral pulses strong  ABDOMEN: soft, nontender, no hepatosplenomegaly, no masses and bowel sounds normal  RECTAL: normal sphincter tone, no rectal masses, prostate normal size, smooth, nontender without nodules or masses  MS: no gross musculoskeletal defects noted, no edema  SKIN: no suspicious lesions or rashes  NEURO: Normal strength and tone, mentation intact and speech normal  PSYCH: mentation appears normal, affect normal/bright    Labs pending     ASSESSMENT/PLAN:   1. Routine general medical examination at a health care facility      2. Hyperlipidemia LDL goal <100  If 10 year risk > 10% then try crestor in lieu of atorvastatin; discussed with patient; side effects and risks discussed  - Lipid panel reflex to direct LDL Fasting  - Comprehensive metabolic panel  - CBC with platelets    3. Screen for colon cancer  Colonoscopy in 2020    4. Prostate cancer screening    - Prostate spec antigen screen    5. Encounter for screening for HIV    - HIV Antigen Antibody Combo    6. Chronic pain of left knee  Check x-ray return for cortisone injection if moderate or severe OA  If no OA and symptoms of locking catching worsen, consider MRI; discussed with patient he will contact me if that is the case  - XR Knee Standing Left 2 Views; Future    7. Male erectile disorder    - sildenafil (VIAGRA) 50 MG tablet; Take 0.5 tablets (25 mg) by mouth daily as needed (erectile dysfunction) 30 min to 4 hrs before sex. Do not use with nitroglycerin, terazosin or doxazosin.  Dispense: 30 tablet; Refill: 11    COUNSELING:  Reviewed preventive health counseling, as reflected in patient instructions  Special attention given to:        Regular exercise       Healthy diet/nutrition        Immunizations    Flu shot today; Shingrix recommended              Consider Hep C screening for patients born between 1945 and 1965       HIV screeninx in teen years, 1x in adult years, and at intervals if high risk; will do today        Colon cancer screening       Prostate cancer screening       Consider lung cancer screening for ages 55-80 years and 30 pack-year smoking history  Never smoker    BP Readings from Last 1 Encounters:   10/31/18 123/75     Estimated body mass index is 31.6 kg/(m^2) as calculated from the following:    Height as of this encounter: 6' (1.829 m).    Weight as of this encounter: 233 lb (105.7 kg).      Weight management plan: Discussed healthy diet and exercise guidelines and patient will follow up in 12 months in clinic to re-evaluate.     reports that he has never smoked. He has never used smokeless tobacco.      Counseling Resources:  ATP IV Guidelines  Pooled Cohorts Equation Calculator  FRAX Risk Assessment  ICSI Preventive Guidelines  Dietary Guidelines for Americans,   USDA's MyPlate  ASA Prophylaxis  Lung CA Screening    Mike Mathur MD  [facility]

## 2018-10-30 NOTE — PATIENT INSTRUCTIONS
"You should get the new shingles vaccine \"SHINGRIX\" (not Zostavax) at your pharmacy.          Preventive Health Recommendations  Male Ages 50 - 64    Yearly exam:             See your health care provider every year in order to  o   Review health changes.   o   Discuss preventive care.    o   Review your medicines if your doctor has prescribed any.     Have a cholesterol test every 5 years, or more frequently if you are at risk for high cholesterol/heart disease.     Have a diabetes test (fasting glucose) every three years. If you are at risk for diabetes, you should have this test more often.     Have a colonoscopy at age 50, or have a yearly FIT test (stool test). These exams will check for colon cancer.      Talk with your health care provider about whether or not a prostate cancer screening test (PSA) is right for you.    You should be tested each year for STDs (sexually transmitted diseases), if you re at risk.     Shots: Get a flu shot each year. Get a tetanus shot every 10 years.     Nutrition:    Eat at least 5 servings of fruits and vegetables daily.     Eat whole-grain bread, whole-wheat pasta and brown rice instead of white grains and rice.     Get adequate Calcium and Vitamin D.     Lifestyle    Exercise for at least 150 minutes a week (30 minutes a day, 5 days a week). This will help you control your weight and prevent disease.     Limit alcohol to one drink per day.     No smoking.     Wear sunscreen to prevent skin cancer.     See your dentist every six months for an exam and cleaning.     See your eye doctor every 1 to 2 years.    "

## 2018-10-31 ENCOUNTER — RADIANT APPOINTMENT (OUTPATIENT)
Dept: GENERAL RADIOLOGY | Facility: CLINIC | Age: 58
End: 2018-10-31
Attending: INTERNAL MEDICINE
Payer: COMMERCIAL

## 2018-10-31 ENCOUNTER — OFFICE VISIT (OUTPATIENT)
Dept: FAMILY MEDICINE | Facility: CLINIC | Age: 58
End: 2018-10-31
Payer: COMMERCIAL

## 2018-10-31 VITALS
HEIGHT: 72 IN | DIASTOLIC BLOOD PRESSURE: 75 MMHG | TEMPERATURE: 97.4 F | BODY MASS INDEX: 31.56 KG/M2 | HEART RATE: 57 BPM | SYSTOLIC BLOOD PRESSURE: 123 MMHG | WEIGHT: 233 LBS | OXYGEN SATURATION: 97 %

## 2018-10-31 DIAGNOSIS — Z00.00 ROUTINE GENERAL MEDICAL EXAMINATION AT A HEALTH CARE FACILITY: Primary | ICD-10-CM

## 2018-10-31 DIAGNOSIS — Z11.4 ENCOUNTER FOR SCREENING FOR HIV: ICD-10-CM

## 2018-10-31 DIAGNOSIS — E78.5 HYPERLIPIDEMIA LDL GOAL <100: ICD-10-CM

## 2018-10-31 DIAGNOSIS — G89.29 CHRONIC PAIN OF LEFT KNEE: ICD-10-CM

## 2018-10-31 DIAGNOSIS — Z12.11 SCREEN FOR COLON CANCER: ICD-10-CM

## 2018-10-31 DIAGNOSIS — M25.562 CHRONIC PAIN OF LEFT KNEE: ICD-10-CM

## 2018-10-31 DIAGNOSIS — Z12.5 PROSTATE CANCER SCREENING: ICD-10-CM

## 2018-10-31 DIAGNOSIS — N52.9 MALE ERECTILE DISORDER: ICD-10-CM

## 2018-10-31 DIAGNOSIS — Z23 NEED FOR PROPHYLACTIC VACCINATION AND INOCULATION AGAINST INFLUENZA: ICD-10-CM

## 2018-10-31 LAB
ALBUMIN SERPL-MCNC: 4.2 G/DL (ref 3.4–5)
ALP SERPL-CCNC: 85 U/L (ref 40–150)
ALT SERPL W P-5'-P-CCNC: 37 U/L (ref 0–70)
ANION GAP SERPL CALCULATED.3IONS-SCNC: 9 MMOL/L (ref 3–14)
AST SERPL W P-5'-P-CCNC: 20 U/L (ref 0–45)
BILIRUB SERPL-MCNC: 0.8 MG/DL (ref 0.2–1.3)
BUN SERPL-MCNC: 20 MG/DL (ref 7–30)
CALCIUM SERPL-MCNC: 9.3 MG/DL (ref 8.5–10.1)
CHLORIDE SERPL-SCNC: 105 MMOL/L (ref 94–109)
CHOLEST SERPL-MCNC: 209 MG/DL
CO2 SERPL-SCNC: 26 MMOL/L (ref 20–32)
CREAT SERPL-MCNC: 0.84 MG/DL (ref 0.66–1.25)
ERYTHROCYTE [DISTWIDTH] IN BLOOD BY AUTOMATED COUNT: 15 % (ref 10–15)
GFR SERPL CREATININE-BSD FRML MDRD: >90 ML/MIN/1.7M2
GLUCOSE SERPL-MCNC: 107 MG/DL (ref 70–99)
HCT VFR BLD AUTO: 45.8 % (ref 40–53)
HDLC SERPL-MCNC: 46 MG/DL
HGB BLD-MCNC: 14.7 G/DL (ref 13.3–17.7)
HIV 1+2 AB+HIV1 P24 AG SERPL QL IA: NONREACTIVE
LDLC SERPL CALC-MCNC: 119 MG/DL
MCH RBC QN AUTO: 27.6 PG (ref 26.5–33)
MCHC RBC AUTO-ENTMCNC: 32.1 G/DL (ref 31.5–36.5)
MCV RBC AUTO: 86 FL (ref 78–100)
NONHDLC SERPL-MCNC: 163 MG/DL
PLATELET # BLD AUTO: 241 10E9/L (ref 150–450)
POTASSIUM SERPL-SCNC: 4.6 MMOL/L (ref 3.4–5.3)
PROT SERPL-MCNC: 7.7 G/DL (ref 6.8–8.8)
PSA SERPL-ACNC: 1.02 UG/L (ref 0–4)
RBC # BLD AUTO: 5.32 10E12/L (ref 4.4–5.9)
SODIUM SERPL-SCNC: 140 MMOL/L (ref 133–144)
TRIGL SERPL-MCNC: 220 MG/DL
WBC # BLD AUTO: 7.4 10E9/L (ref 4–11)

## 2018-10-31 PROCEDURE — G0103 PSA SCREENING: HCPCS | Performed by: INTERNAL MEDICINE

## 2018-10-31 PROCEDURE — 99396 PREV VISIT EST AGE 40-64: CPT | Mod: 25 | Performed by: INTERNAL MEDICINE

## 2018-10-31 PROCEDURE — 36415 COLL VENOUS BLD VENIPUNCTURE: CPT | Performed by: INTERNAL MEDICINE

## 2018-10-31 PROCEDURE — 80061 LIPID PANEL: CPT | Performed by: INTERNAL MEDICINE

## 2018-10-31 PROCEDURE — 73560 X-RAY EXAM OF KNEE 1 OR 2: CPT | Mod: LT

## 2018-10-31 PROCEDURE — 87389 HIV-1 AG W/HIV-1&-2 AB AG IA: CPT | Performed by: INTERNAL MEDICINE

## 2018-10-31 PROCEDURE — 90686 IIV4 VACC NO PRSV 0.5 ML IM: CPT | Performed by: INTERNAL MEDICINE

## 2018-10-31 PROCEDURE — 99213 OFFICE O/P EST LOW 20 MIN: CPT | Mod: 25 | Performed by: INTERNAL MEDICINE

## 2018-10-31 PROCEDURE — 80053 COMPREHEN METABOLIC PANEL: CPT | Performed by: INTERNAL MEDICINE

## 2018-10-31 PROCEDURE — 90471 IMMUNIZATION ADMIN: CPT | Performed by: INTERNAL MEDICINE

## 2018-10-31 PROCEDURE — 85027 COMPLETE CBC AUTOMATED: CPT | Performed by: INTERNAL MEDICINE

## 2018-10-31 RX ORDER — ROSUVASTATIN CALCIUM 10 MG/1
10 TABLET, COATED ORAL DAILY
Qty: 90 TABLET | Refills: 3 | Status: SHIPPED | OUTPATIENT
Start: 2018-10-31 | End: 2019-11-20

## 2018-10-31 RX ORDER — SILDENAFIL 50 MG/1
25 TABLET, FILM COATED ORAL DAILY PRN
Qty: 30 TABLET | Refills: 11 | Status: SHIPPED | OUTPATIENT
Start: 2018-10-31 | End: 2020-08-24

## 2018-10-31 NOTE — MR AVS SNAPSHOT
"              After Visit Summary   10/31/2018    Darrell Regalado    MRN: [id number]           Patient Information     Date Of Birth          1960        Visit Information        Provider Department      10/31/2018 8:30 AM Mike Mathur MD [facility]        Today's Diagnoses     Routine general medical examination at a health care facility    -  1    Hyperlipidemia LDL goal <100        Screen for colon cancer        Prostate cancer screening        Encounter for screening for HIV        Chronic pain of left knee        Male erectile disorder          Care Instructions    You should get the new shingles vaccine \"SHINGRIX\" (not Zostavax) at your pharmacy.          Preventive Health Recommendations  Male Ages 50 - 64    Yearly exam:             See your health care provider every year in order to  o   Review health changes.   o   Discuss preventive care.    o   Review your medicines if your doctor has prescribed any.     Have a cholesterol test every 5 years, or more frequently if you are at risk for high cholesterol/heart disease.     Have a diabetes test (fasting glucose) every three years. If you are at risk for diabetes, you should have this test more often.     Have a colonoscopy at age 50, or have a yearly FIT test (stool test). These exams will check for colon cancer.      Talk with your health care provider about whether or not a prostate cancer screening test (PSA) is right for you.    You should be tested each year for STDs (sexually transmitted diseases), if you re at risk.     Shots: Get a flu shot each year. Get a tetanus shot every 10 years.     Nutrition:    Eat at least 5 servings of fruits and vegetables daily.     Eat whole-grain bread, whole-wheat pasta and brown rice instead of white grains and rice.     Get adequate Calcium and Vitamin D.     Lifestyle    Exercise for at least 150 minutes a week (30 minutes a day, 5 days a week). This will help you control your weight and " "prevent disease.     Limit alcohol to one drink per day.     No smoking.     Wear sunscreen to prevent skin cancer.     See your dentist every six months for an exam and cleaning.     See your eye doctor every 1 to 2 years.            Follow-ups after your visit        Follow-up notes from your care team     Return in about 1 year (around 10/31/2019) for Preventive Visit.      Your next 10 appointments already scheduled     May 15, 2019  9:30 AM CDT   Return Sleep Patient with Marshall Catalan MD   [facility] ([facility] [place])    [address] [place] [id number] [phone]              Who to contact     If you have questions or need follow up information about today's clinic visit or your schedule please contact [facility] directly at [phone].  Normal or non-critical lab and imaging results will be communicated to you by MyChart, letter or phone within 4 business days after the clinic has received the results. If you do not hear from us within 7 days, please contact the clinic through MyChart or phone. If you have a critical or abnormal lab result, we will notify you by phone as soon as possible.  Submit refill requests through [employer] or call your pharmacy and they will forward the refill request to us. Please allow 3 business days for your refill to be completed.          Additional Information About Your Visit        [employer] Information     [employer] lets you send messages to your doctor, view your test results, renew your prescriptions, schedule appointments and more. To sign up, go to [email or web address] . Click on \"Log in\" on the left side of the screen, which will take you to the Welcome page. Then click on \"Sign up Now\" on the right side of the page.     You will be asked to enter the access code listed below, as well as some personal information. Please follow the directions to create your username and password.     Your access " code is: [email or web address]  Expires: 2019  8:09 AM     Your access code will  in 90 days. If you need help or a new code, please call your [place] clinic or [phone].        Care EveryWhere ID     This is your Care EveryWhere ID. This could be used by other organizations to access your [place] medical records  [id number]        Your Vitals Were     Pulse Temperature Height Pulse Oximetry BMI (Body Mass Index)       57 97.4  F (36.3  C) (Tympanic) 6' (1.829 m) 97% 31.6 kg/m2        Blood Pressure from Last 3 Encounters:   10/31/18 123/75   18 117/70   17 117/80    Weight from Last 3 Encounters:   10/31/18 233 lb (105.7 kg)   18 216 lb (98 kg)   17 225 lb (102.1 kg)              We Performed the Following     CBC with platelets     Comprehensive metabolic panel     HIV Antigen Antibody Combo     Lipid panel reflex to direct LDL Fasting     Prostate spec antigen screen          Today's Medication Changes          These changes are accurate as of 10/31/18  9:10 AM.  If you have any questions, ask your nurse or doctor.               Start taking these medicines.        Dose/Directions    sildenafil 50 MG tablet   Commonly known as:  VIAGRA   Used for:  Male erectile disorder   Started by:  Mike Mathur MD        Dose:  25 mg   Take 0.5 tablets (25 mg) by mouth daily as needed (erectile dysfunction) 30 min to 4 hrs before sex. Do not use with nitroglycerin, terazosin or doxazosin.   Quantity:  30 tablet   Refills:  11         Stop taking these medicines if you haven't already. Please contact your care team if you have questions.     atorvastatin 20 MG tablet   Commonly known as:  LIPITOR   Stopped by:  Mike Mathur MD                Where to get your medicines      Some of these will need a paper prescription and others can be bought over the counter.  Ask your nurse if you have questions.     Bring a paper prescription for each of these medications     sildenafil 50 MG tablet                 Primary Care Provider Office Phone # Fax #    Mike Mathur [phone] [address] [address]        Equal Access to Services     MANISHA REDDY : Hadii aad ku hadasho Soomaali, waaxda luqadaha, qaybta kaalmada adeegyada, waxalisha brownn kalie monterotan asencio. So [facility] [phone].    ATENCIÓN: Si habla español, tiene a cartagena disposición servicios gratuitos de asistencia lingüística. Llame al [phone].    We comply with applicable federal civil rights laws and [place] laws. We do not discriminate on the basis of race, color, national origin, age, disability, sex, sexual orientation, or gender identity.            Thank you!     Thank you for choosing [facility]  for your care. Our goal is always to provide you with excellent care. Hearing back from our patients is one way we can continue to improve our services. Please take a few minutes to complete the written survey that you may receive in the mail after your visit with us. Thank you!             Your Updated Medication List - Protect others around you: Learn how to safely use, store and throw away your medicines at www.disposemymeds.org.          This list is accurate as of 10/31/18  9:10 AM.  Always use your most recent med list.                   Brand Name Dispense Instructions for use Diagnosis    sildenafil 50 MG tablet    VIAGRA    30 tablet    Take 0.5 tablets (25 mg) by mouth daily as needed (erectile dysfunction) 30 min to 4 hrs before sex. Do not use with nitroglycerin, terazosin or doxazosin.    Male erectile disorder

## 2018-10-31 NOTE — PROGRESS NOTES

## 2018-10-31 NOTE — NURSING NOTE
Chief Complaint   Patient presents with     Physical        Initial /75 (BP Location: Right arm, Patient Position: Chair, Cuff Size: Adult Regular)  Pulse 57  Temp 97.4  F (36.3  C) (Tympanic)  Ht 6' (1.829 m)  Wt 233 lb (105.7 kg)  SpO2 97%  BMI 31.6 kg/m2 Estimated body mass index is 31.6 kg/(m^2) as calculated from the following:    Height as of this encounter: 6' (1.829 m).    Weight as of this encounter: 233 lb (105.7 kg)..    BP completed using cuff size: regular  MEDICATIONS REVIEWED  SOCIAL AND FAMILY HX REVIEWED  Amelia García CMA

## 2018-10-31 NOTE — LETTER
[place] [address] [facility]  [address]  [place]  [address]  Tel: [phone]    November 1, 2018    Darrell Regalado  [address] [facility] [address]        Dear Mr. Regalado,    The following letter pertains to your most recent diagnostic tests:    Good news! Your knee x-ray does NOT show significant knee osteoarthritis.  If the knee catching and locking persists or worsens and MRI of the knee would be a logical next step.      If you have any further questions or problems, please contact our office.      Sincerely,    Mike Mathur MD/VERONICA

## 2018-10-31 NOTE — LETTER
"[facility]  [address] [facility] [place]  [address]  [place]  [address]  Tel: [phone]    November 1, 2018    Darrell Regalado  [address] [facility] [address]        Dear Tez Sabine,    The following letter pertains to your most recent diagnostic tests:    -Your HIV test is negative.     -Your prostate specific antigen (PSA) test result returned normal.     -Liver and gallbladder tests are normal for you. (ALT,AST, Alk phos, bilirubin), kidney function is normal for you (Creatinine, GFR), Sodium is normal, Potassium is normal for you, Calcium is normal for you, Glucose (blood sugar) is elevated but NOT in the diabetic range.    -Your total cholesterol is 209 which is above your goal of total cholesterol less than 200.    -Your triglycerides are 220 which are above your goal of triglycerides less than 150.    -Your HDL or \"good cholesterol\" is 46 which is at your goal of HDL cholesterol greater than 40.    -Your LDL cholesterol or \"bad cholesterol\" is 119 which is above your goal of LDL cholesterol less than <100.  Your LDL goal is based on your risk factors for artery disease.     -Your complete blood counts including your hemoglobin returned normal for you.         Bottom line:  Based on your cholesterol levels and other risk factors, your calculated statistical risk for having a heart attack over the next 10 years is elevated.  I estimate a 8.2% chance of heart attack over the next 10 years.  Because we believe that statin medications such as atorvastatin (Lipitor) have protective effects in the blood vessels that extend beyond their ability to lower cholesterol, if you took atorvastatin (Lipitor) regularly, you could bring that 10-year statistical risk for heart attack down significantly.  Generally, we recommend statin cholesterol-lowering medications such as rosuvastatin (Crestor) to individuals with 10 year statistical risk for heart attack greater than 7.5%.  As such, I would recommend you " starting that drug for prevention purposes.  You should be informed that a very small minority of people who take rosuvastatin (Crestor) can develop muscle pain and weakness as a side effect from that drug.  If you experience those side effects, then  stop the drug and contact me.  If you start the medication, you should have a follow up fasting cholesterol panel after you have been taking the medication for 2 months.  You can schedule a lab appointment for that purpose.  I sent an prescription for Crestor (rosuvastatin) to your pharmacy.      Reducing carbohydrates and fats in your diet, losing weight and consuming less alcohol can improve your triglyceride and glucose levels.       Follow up:  Schedule a follow up lab appointment for 2 months after starting Crestor.      Sincerely,    Mike Mathur MD/MSL          Enclosure: Lab Results  Results for orders placed or performed in visit on 10/31/18   Lipid panel reflex to direct LDL Fasting   Result Value Ref Range    Cholesterol 209 (H) <200 mg/dL    Triglycerides 220 (H) <150 mg/dL    HDL Cholesterol 46 >39 mg/dL    LDL Cholesterol Calculated 119 (H) <100 mg/dL    Non HDL Cholesterol 163 (H) <130 mg/dL   Comprehensive metabolic panel   Result Value Ref Range    Sodium 140 133 - 144 mmol/L    Potassium 4.6 3.4 - 5.3 mmol/L    Chloride 105 94 - 109 mmol/L    Carbon Dioxide 26 20 - 32 mmol/L    Anion Gap 9 3 - 14 mmol/L    Glucose 107 (H) 70 - 99 mg/dL    Urea Nitrogen 20 7 - 30 mg/dL    Creatinine 0.84 0.66 - 1.25 mg/dL    GFR Estimate >90 >60 mL/min/1.7m2    GFR Estimate If Black >90 >60 mL/min/1.7m2    Calcium 9.3 8.5 - 10.1 mg/dL    Bilirubin Total 0.8 0.2 - 1.3 mg/dL    Albumin 4.2 3.4 - 5.0 g/dL    Protein Total 7.7 6.8 - 8.8 g/dL    Alkaline Phosphatase 85 40 - 150 U/L    ALT 37 0 - 70 U/L    AST 20 0 - 45 U/L   CBC with platelets   Result Value Ref Range    WBC 7.4 4.0 - 11.0 10e9/L    RBC Count 5.32 4.4 - 5.9 10e12/L    Hemoglobin 14.7 13.3 - 17.7 g/dL     Hematocrit 45.8 40.0 - 53.0 %    MCV 86 78 - 100 fl    MCH 27.6 26.5 - 33.0 pg    MCHC 32.1 31.5 - 36.5 g/dL    RDW 15.0 10.0 - 15.0 %    Platelet Count 241 150 - 450 10e9/L   Prostate spec antigen screen   Result Value Ref Range    PSA 1.02 0 - 4 ug/L   HIV Antigen Antibody Combo   Result Value Ref Range    HIV Antigen Antibody Combo Nonreactive NR^Nonreactive

## 2018-11-01 NOTE — PROGRESS NOTES
"The following letter pertains to your most recent diagnostic tests:    -Your HIV test is negative.     -Your prostate specific antigen (PSA) test result returned normal.     -Liver and gallbladder tests are normal for you. (ALT,AST, Alk phos, bilirubin), kidney function is normal for you (Creatinine, GFR), Sodium is normal, Potassium is normal for you, Calcium is normal for you, Glucose (blood sugar) is elevated but NOT in the diabetic range.    -Your total cholesterol is 209 which is above your goal of total cholesterol less than 200.    -Your triglycerides are 220 which are above your goal of triglycerides less than 150.    -Your HDL or \"good cholesterol\" is 46 which is at your goal of HDL cholesterol greater than 40.    -Your LDL cholesterol or \"bad cholesterol\" is 119 which is above your goal of LDL cholesterol less than <100.  Your LDL goal is based on your risk factors for artery disease.     -Your complete blood counts including your hemoglobin returned normal for you.         Bottom line:  Based on your cholesterol levels and other risk factors, your calculated statistical risk for having a heart attack over the next 10 years is elevated.  I estimate a 8.2% chance of heart attack over the next 10 years.  Because we believe that statin medications such as atorvastatin (Lipitor) have protective effects in the blood vessels that extend beyond their ability to lower cholesterol, if you took atorvastatin (Lipitor) regularly, you could bring that 10-year statistical risk for heart attack down significantly.  Generally, we recommend statin cholesterol-lowering medications such as rosuvastatin (Crestor) to individuals with 10 year statistical risk for heart attack greater than 7.5%.  As such, I would recommend you starting that drug for prevention purposes.  You should be informed that a very small minority of people who take rosuvastatin (Crestor) can develop muscle pain and weakness as a side effect from that " drug.  If you experience those side effects, then  stop the drug and contact me.  If you start the medication, you should have a follow up fasting cholesterol panel after you have been taking the medication for 2 months.  You can schedule a lab appointment for that purpose.  I sent an prescription for Crestor (rosuvastatin) to your pharmacy.      Reducing carbohydrates and fats in your diet, losing weight and consuming less alcohol can improve your triglyceride and glucose levels.       Follow up:  Schedule a follow up lab appointment for 2 months after starting Crestor.        Sincerely,    Dr. Mathur    The 10-year ASCVD risk score (Evigagandeep DAVIDSON Jr, et al., 2013) is: 8.2%    Values used to calculate the score:      Age: 58 years      Sex: Male      Is Non- : No      Diabetic: No      Tobacco smoker: No      Systolic Blood Pressure: 123 mmHg      Is BP treated: No      HDL Cholesterol: 46 mg/dL      Total Cholesterol: 209 mg/dL

## 2018-11-01 NOTE — PROGRESS NOTES
The following letter pertains to your most recent diagnostic tests:    Good news! Your knee x-ray does NOT show significant knee osteoarthritis.  If the knee catching and locking persists or worsens and MRI of the knee would be a logical next step.          Sincerely,    Dr. Mathur

## 2019-05-22 ENCOUNTER — OFFICE VISIT (OUTPATIENT)
Dept: SLEEP MEDICINE | Facility: CLINIC | Age: 59
End: 2019-05-22
Payer: COMMERCIAL

## 2019-05-22 VITALS
HEART RATE: 63 BPM | DIASTOLIC BLOOD PRESSURE: 80 MMHG | WEIGHT: 235 LBS | SYSTOLIC BLOOD PRESSURE: 135 MMHG | BODY MASS INDEX: 31.83 KG/M2 | HEIGHT: 72 IN | RESPIRATION RATE: 16 BRPM | OXYGEN SATURATION: 96 %

## 2019-05-22 DIAGNOSIS — G47.33 OSA (OBSTRUCTIVE SLEEP APNEA): Primary | ICD-10-CM

## 2019-05-22 PROCEDURE — 99213 OFFICE O/P EST LOW 20 MIN: CPT | Performed by: INTERNAL MEDICINE

## 2019-05-22 ASSESSMENT — MIFFLIN-ST. JEOR: SCORE: 1919.08

## 2019-05-22 NOTE — PATIENT INSTRUCTIONS
Your Body mass index is 31.86 kg/m .  Weight management is a personal decision.  If you are interested in exploring weight loss strategies, the following discussion covers the approaches that may be successful. Body mass index (BMI) is one way to tell whether you are at a healthy weight, overweight, or obese. It measures your weight in relation to your height.  A BMI of 18.5 to 24.9 is in the healthy range. A person with a BMI of 25 to 29.9 is considered overweight, and someone with a BMI of 30 or greater is considered obese. More than two-thirds of American adults are considered overweight or obese.  Being overweight or obese increases the risk for further weight gain. Excess weight may lead to heart disease and diabetes.  Creating and following plans for healthy eating and physical activity may help you improve your health.  Weight control is part of healthy lifestyle and includes exercise, emotional health, and healthy eating habits. Careful eating habits lifelong are the mainstay of weight control. Though there are significant health benefits from weight loss, long-term weight loss with diet alone may be very difficult to achieve- studies show long-term success with dietary management in less than 10% of people. Attaining a healthy weight may be especially difficult to achieve in those with severe obesity. In some cases, medications, devices and surgical management might be considered.  What can you do?  If you are overweight or obese and are interested in methods for weight loss, you should discuss this with your provider.     Consider reducing daily calorie intake by 500 calories.     Keep a food journal.     Avoiding skipping meals, consider cutting portions instead.    Diet combined with exercise helps maintain muscle while optimizing fat loss. Strength training is particularly important for building and maintaining muscle mass. Exercise helps reduce stress, increase energy, and improves fitness.  Increasing exercise without diet control, however, may not burn enough calories to loose weight.       Start walking three days a week 10-20 minutes at a time    Work towards walking thirty minutes five days a week     Eventually, increase the speed of your walking for 1-2 minutes at time    In addition, we recommend that you review healthy lifestyles and methods for weight loss available through the National Institutes of Health patient information sites:  http://win.niddk.nih.gov/publications/index.htm    And look into health and wellness programs that may be available through your health insurance provider, employer, local community center, or [employer].    Weight management plan: Patient was referred to their PCP to discuss a diet and exercise plan.

## 2019-05-22 NOTE — NURSING NOTE
"Chief Complaint   Patient presents with     CPAP Follow Up     Follow up jd        Initial /80   Pulse 63   Resp 16   Ht 1.829 m (6' 0.01\")   Wt 106.6 kg (235 lb)   SpO2 96%   BMI 31.86 kg/m   Estimated body mass index is 31.86 kg/m  as calculated from the following:    Height as of this encounter: 1.829 m (6' 0.01\").    Weight as of this encounter: 106.6 kg (235 lb).    Medication Reconciliation: complete  ESS 5    Della Bocanegra MA    "

## 2019-05-22 NOTE — PROGRESS NOTES
"  Obstructive Sleep Apnea - PAP Follow-Up Visit:    Chief Complaint   Patient presents with     CPAP Follow Up     Follow up jd        Darrell Regalado comes in today for follow-up of their moderate sleep apnea, managed with CPAP.      PSG from 11/28/2017 showed an apnea hypopnea index of 28.7 per hour.     Overall, he rates the experience with PAP as 5 (0 poor, 10 great). The mask is comfortable. The mask is not leaking.  He is not snoring with the mask on. He is not having gasp arousals.  He is not having significant oral/nasal dryness. The pressure settings are not comfortable.     Patient complains that pressure feels inadequate. His original auto PAP settings were 5-15 cm H2O and he is now using another device with auto PAP settings 8-15 cm H2O.     He uses nasal mask.     He does feel rested in the morning.    Total score - [facility]: 5 (5/22/2019  9:36 AM)    [employer]     Auto-PAP 8-15 cmH2O download:  13/30 total days of use. 17 nonuse days. 6/30 days with >4 hours use.  Average use 3 hours 29 minutes per day. Median Leak 19.8 L/min. 95%ile Leak 37.6 L/min. CPAP 95% pressure 9.1cm. AHI 1.0    Reviewed by team: Tobacco  Allergies  Meds       Reviewed by provider:        Problem List:  Patient Active Problem List    Diagnosis Date Noted     Hyperlipidemia LDL goal <100 10/31/2018     Priority: Medium     Male erectile disorder 10/31/2018     Priority: Medium     JD (obstructive sleep apnea) 11/30/2017     Priority: Medium     Lumbago 02/25/2009     Priority: Medium          /80   Pulse 63   Resp 16   Ht 1.829 m (6' 0.01\")   Wt 106.6 kg (235 lb)   SpO2 96%   BMI 31.86 kg/m      Impression/Plan:     Moderate sleep apnea.     - Patient is not tolerating PAP very well. I think there is an element of poor motivation to use therapy. One of the barriers to regular compliance is air hunger. I have increased auto PAP pressure settings.     - We discussed a titration PSG to optimize pressure settings. " Alternative therapy option of oral appliance was also discussed. Patient declines these options.     Plan:     1. Continue auto PAP therapy 9-15 cm H2O     Darrell Regalado will follow up in about 1 year(s).     Fifteen minutes spent with patient, all of which were spent face-to-face counseling, consulting, coordinating plan of care.      Marshall Catalan MD, MD    CC:  Mike Mathur,

## 2019-11-20 ENCOUNTER — OFFICE VISIT (OUTPATIENT)
Dept: FAMILY MEDICINE | Facility: CLINIC | Age: 59
End: 2019-11-20
Payer: COMMERCIAL

## 2019-11-20 ENCOUNTER — TELEPHONE (OUTPATIENT)
Dept: FAMILY MEDICINE | Facility: CLINIC | Age: 59
End: 2019-11-20

## 2019-11-20 VITALS
TEMPERATURE: 97.6 F | HEIGHT: 72 IN | SYSTOLIC BLOOD PRESSURE: 122 MMHG | WEIGHT: 239 LBS | BODY MASS INDEX: 32.37 KG/M2 | OXYGEN SATURATION: 95 % | HEART RATE: 72 BPM | DIASTOLIC BLOOD PRESSURE: 76 MMHG

## 2019-11-20 DIAGNOSIS — B00.1 RECURRENT COLD SORES: ICD-10-CM

## 2019-11-20 DIAGNOSIS — E78.5 HYPERLIPIDEMIA LDL GOAL <100: ICD-10-CM

## 2019-11-20 DIAGNOSIS — G47.33 OSA (OBSTRUCTIVE SLEEP APNEA): ICD-10-CM

## 2019-11-20 DIAGNOSIS — N52.9 MALE ERECTILE DISORDER: ICD-10-CM

## 2019-11-20 DIAGNOSIS — Z23 NEED FOR PROPHYLACTIC VACCINATION AND INOCULATION AGAINST INFLUENZA: ICD-10-CM

## 2019-11-20 DIAGNOSIS — Z12.5 PROSTATE CANCER SCREENING: ICD-10-CM

## 2019-11-20 DIAGNOSIS — Z00.00 ROUTINE GENERAL MEDICAL EXAMINATION AT A HEALTH CARE FACILITY: Primary | ICD-10-CM

## 2019-11-20 DIAGNOSIS — Z12.11 SCREENING FOR COLON CANCER: ICD-10-CM

## 2019-11-20 DIAGNOSIS — R51.9 NONINTRACTABLE HEADACHE, UNSPECIFIED CHRONICITY PATTERN, UNSPECIFIED HEADACHE TYPE: ICD-10-CM

## 2019-11-20 LAB
ALBUMIN SERPL-MCNC: 4.1 G/DL (ref 3.4–5)
ALP SERPL-CCNC: 95 U/L (ref 40–150)
ALT SERPL W P-5'-P-CCNC: 31 U/L (ref 0–70)
ANION GAP SERPL CALCULATED.3IONS-SCNC: 7 MMOL/L (ref 3–14)
AST SERPL W P-5'-P-CCNC: 13 U/L (ref 0–45)
BILIRUB SERPL-MCNC: 1 MG/DL (ref 0.2–1.3)
BUN SERPL-MCNC: 19 MG/DL (ref 7–30)
CALCIUM SERPL-MCNC: 8.9 MG/DL (ref 8.5–10.1)
CHLORIDE SERPL-SCNC: 107 MMOL/L (ref 94–109)
CHOLEST SERPL-MCNC: 246 MG/DL
CO2 SERPL-SCNC: 25 MMOL/L (ref 20–32)
CREAT SERPL-MCNC: 0.91 MG/DL (ref 0.66–1.25)
ERYTHROCYTE [DISTWIDTH] IN BLOOD BY AUTOMATED COUNT: 14.7 % (ref 10–15)
GFR SERPL CREATININE-BSD FRML MDRD: >90 ML/MIN/{1.73_M2}
GLUCOSE SERPL-MCNC: 110 MG/DL (ref 70–99)
HCT VFR BLD AUTO: 43.4 % (ref 40–53)
HDLC SERPL-MCNC: 44 MG/DL
HGB BLD-MCNC: 14.3 G/DL (ref 13.3–17.7)
LDLC SERPL CALC-MCNC: 172 MG/DL
MCH RBC QN AUTO: 28 PG (ref 26.5–33)
MCHC RBC AUTO-ENTMCNC: 32.9 G/DL (ref 31.5–36.5)
MCV RBC AUTO: 85 FL (ref 78–100)
NONHDLC SERPL-MCNC: 202 MG/DL
PLATELET # BLD AUTO: 246 10E9/L (ref 150–450)
POTASSIUM SERPL-SCNC: 4.3 MMOL/L (ref 3.4–5.3)
PROT SERPL-MCNC: 7.2 G/DL (ref 6.8–8.8)
PSA SERPL-ACNC: 1.13 UG/L (ref 0–4)
RBC # BLD AUTO: 5.1 10E12/L (ref 4.4–5.9)
SODIUM SERPL-SCNC: 139 MMOL/L (ref 133–144)
TRIGL SERPL-MCNC: 150 MG/DL
WBC # BLD AUTO: 6.8 10E9/L (ref 4–11)

## 2019-11-20 PROCEDURE — 80061 LIPID PANEL: CPT | Performed by: INTERNAL MEDICINE

## 2019-11-20 PROCEDURE — 90682 RIV4 VACC RECOMBINANT DNA IM: CPT | Performed by: INTERNAL MEDICINE

## 2019-11-20 PROCEDURE — 36415 COLL VENOUS BLD VENIPUNCTURE: CPT | Performed by: INTERNAL MEDICINE

## 2019-11-20 PROCEDURE — 80053 COMPREHEN METABOLIC PANEL: CPT | Performed by: INTERNAL MEDICINE

## 2019-11-20 PROCEDURE — 90471 IMMUNIZATION ADMIN: CPT | Performed by: INTERNAL MEDICINE

## 2019-11-20 PROCEDURE — 85027 COMPLETE CBC AUTOMATED: CPT | Performed by: INTERNAL MEDICINE

## 2019-11-20 PROCEDURE — 99213 OFFICE O/P EST LOW 20 MIN: CPT | Mod: 25 | Performed by: INTERNAL MEDICINE

## 2019-11-20 PROCEDURE — G0103 PSA SCREENING: HCPCS | Performed by: INTERNAL MEDICINE

## 2019-11-20 PROCEDURE — 99396 PREV VISIT EST AGE 40-64: CPT | Mod: 25 | Performed by: INTERNAL MEDICINE

## 2019-11-20 RX ORDER — ROSUVASTATIN CALCIUM 10 MG/1
10 TABLET, COATED ORAL DAILY
Qty: 90 TABLET | Refills: 3 | Status: SHIPPED | OUTPATIENT
Start: 2019-11-20 | End: 2020-11-23

## 2019-11-20 RX ORDER — VALACYCLOVIR HYDROCHLORIDE 1 G/1
2000 TABLET, FILM COATED ORAL 2 TIMES DAILY
Qty: 4 TABLET | Refills: 11 | Status: SHIPPED | OUTPATIENT
Start: 2019-11-20 | End: 2020-12-23

## 2019-11-20 ASSESSMENT — MIFFLIN-ST. JEOR: SCORE: 1932.1

## 2019-11-20 NOTE — TELEPHONE ENCOUNTER
Reason for Call:  MRI for lower back    Detailed comments:     Pt is calling because he has an MRI of the brain scheduled.  He is wondering if he can also have an MRI for his lower back due to continuous lower back pain.  Please advise    Tank: [phone]

## 2019-11-20 NOTE — TELEPHONE ENCOUNTER
I would recommend focusing on the headache for now, if the back is bothersome symptoms, I would be happy to see him in follow up for that

## 2019-11-20 NOTE — LETTER
"[facility]  [address] [facility]  [address]  [place]  [address]  Tel: [phone]    November 21, 2019    Darrell Regalado  [address] [facility] [address]        Dear Mr. Regalado,    The following letter pertains to your most recent diagnostic tests:    -Your total cholesterol is 246 which is above your goal of total cholesterol less than 200.    -Your triglycerides are 150 which are at your goal of triglycerides less than 150.    -Your HDL or \"good cholesterol\" is 44 which is at your goal of HDL cholesterol greater than 40.    -Your LDL cholesterol or \"bad cholesterol\" is 172 which is above your goal of LDL cholesterol less than <130.  Your LDL goal is based on your risk factors for artery disease.     -Liver and gallbladder tests are normal for you. (ALT,AST, Alk phos, bilirubin), kidney function is normal for you (Creatinine, GFR), Sodium is normal, Potassium is normal for you, Calcium is normal for you, Glucose (blood sugar) is moderately elevated but not in the diabetic range.    -Your prostate specific antigen (PSA) test result returned normal.     -Your complete blood counts including your hemoglobin returned normal for you.       Bottom line:  Based on these results I would recommend that you start the rosuvastatin to treat your cholesterol.  We can recheck your cholesterol after you have been taking the rosuvastatin for 2 months.  I will be in touch with you about the results of your MRI when I receive them.      If you are having new back symptoms, I would recommend that you schedule a follow-up appointment with me to evaluate the symptoms prior to obtaining an MRI.          Sincerely,    Dr. Mathur/VERONICA    The 10-year ASCVD risk score ([place] LETY Jr., et al., 2013) is: 10.5%    Values used to calculate the score:      Age: 59 years      Sex: Male      Is Non- : No      Diabetic: No      Tobacco smoker: No      Systolic Blood Pressure: 122 mmHg      Is BP " treated: No      HDL Cholesterol: 44 mg/dL      Total Cholesterol: 246 mg/dL  Results for orders placed or performed in visit on 11/20/19   Lipid panel reflex to direct LDL Fasting     Status: Abnormal   Result Value Ref Range    Cholesterol 246 (H) <200 mg/dL    Triglycerides 150 (H) <150 mg/dL    HDL Cholesterol 44 >39 mg/dL    LDL Cholesterol Calculated 172 (H) <100 mg/dL    Non HDL Cholesterol 202 (H) <130 mg/dL   Comprehensive metabolic panel     Status: Abnormal   Result Value Ref Range    Sodium 139 133 - 144 mmol/L    Potassium 4.3 3.4 - 5.3 mmol/L    Chloride 107 94 - 109 mmol/L    Carbon Dioxide 25 20 - 32 mmol/L    Anion Gap 7 3 - 14 mmol/L    Glucose 110 (H) 70 - 99 mg/dL    Urea Nitrogen 19 7 - 30 mg/dL    Creatinine 0.91 0.66 - 1.25 mg/dL    GFR Estimate >90 >60 mL/min/[1.73_m2]    GFR Estimate If Black >90 >60 mL/min/[1.73_m2]    Calcium 8.9 8.5 - 10.1 mg/dL    Bilirubin Total 1.0 0.2 - 1.3 mg/dL    Albumin 4.1 3.4 - 5.0 g/dL    Protein Total 7.2 6.8 - 8.8 g/dL    Alkaline Phosphatase 95 40 - 150 U/L    ALT 31 0 - 70 U/L    AST 13 0 - 45 U/L   CBC with platelets     Status: None   Result Value Ref Range    WBC 6.8 4.0 - 11.0 10e9/L    RBC Count 5.10 4.4 - 5.9 10e12/L    Hemoglobin 14.3 13.3 - 17.7 g/dL    Hematocrit 43.4 40.0 - 53.0 %    MCV 85 78 - 100 fl    MCH 28.0 26.5 - 33.0 pg    MCHC 32.9 31.5 - 36.5 g/dL    RDW 14.7 10.0 - 15.0 %    Platelet Count 246 150 - 450 10e9/L   Prostate spec antigen screen     Status: None   Result Value Ref Range    PSA 1.13 0 - 4 ug/L

## 2019-11-20 NOTE — PROGRESS NOTES
SUBJECTIVE:   CC: Darrell Regalado is an 59 year old male who presents for preventative health visit.     Healthy Habits:     Getting at least 3 servings of Calcium per day:  NO    Bi-annual eye exam:  NO (willl have this week)    Dental care twice a year:  Yes    Sleep apnea or symptoms of sleep apnea:  Sleep apnea (has CPAP starting to use again )    Diet:  Regular (no restrictions)    Frequency of exercise:  6-7 days/week ([address] steps at work)    Duration of exercise:  Greater than 60 minutes    Taking medications regularly:  No (not taking crestor )    Barriers to taking medications:  Other (trying to control with diet & dislikes taking pills )    Medication side effects:  Muscle aches (from other statin )    PHQ-2 Total Score: 0    Additional concerns today:  Yes (Headaches ongoing )    3 week history of steady headache with insidious onset  Localized to frontal scalp  Did not take an analgesics for it   No vision change  No focal weakness, numbness or dysarthria   Mild associated nausea without vomiting       Today's PHQ-2 Score:   PHQ-2 ( 1999 Pf[employer]) 11/20/2019   Q1: Little interest or pleasure in doing things 0   Q2: Feeling down, depressed or hopeless 0   PHQ-2 Score 0   Q1: Little interest or pleasure in doing things -   Q2: Feeling down, depressed or hopeless -   PHQ-2 Score -       Abuse: Current or Past(Physical, Sexual or Emotional)- No  Do you feel safe in your environment? Yes    Have you ever done Advance Care Planning? (For example, a Health Directive, POLST, or a discussion with a medical provider or your loved ones about your wishes): No, advance care planning information given to patient to review.  Patient declined advance care planning discussion at this time.    Social History     Tobacco Use     Smoking status: Never Smoker     Smokeless tobacco: Never Used   Substance Use Topics     Alcohol use: No     Comment: sober since 2006     If you drink alcohol do you typically have >3 drinks per  day or >7 drinks per week? No    Alcohol Use 11/20/2019   Prescreen: >3 drinks/day or >7 drinks/week? No       Last PSA:   PSA   Date Value Ref Range Status   10/31/2018 1.02 0 - 4 ug/L Final     Comment:     Assay Method:  Chemiluminescence using Siemens Vista analyzer       Reviewed orders with patient. Reviewed health maintenance and updated orders accordingly - Yes  Patient Active Problem List   Diagnosis     Lumbago     RASHID (obstructive sleep apnea)     Hyperlipidemia LDL goal <100     Male erectile disorder     Past Surgical History:   Procedure Laterality Date     ORTHOPEDIC SURGERY      finger fracture repair        Social History     Tobacco Use     Smoking status: Never Smoker     Smokeless tobacco: Never Used   Substance Use Topics     Alcohol use: No     Comment: sober since 2006     Family History   Problem Relation Age of Onset     Hypertension Mother      Breast Cancer Mother      Diabetes Father      Cancer - colorectal No family hx of      Prostate Cancer No family hx of          Current Outpatient Medications   Medication Sig Dispense Refill     sildenafil (VIAGRA) 50 MG tablet Take 0.5 tablets (25 mg) by mouth daily as needed (erectile dysfunction) 30 min to 4 hrs before sex. Do not use with nitroglycerin, terazosin or doxazosin. 30 tablet 11     valACYclovir (VALTREX) 1000 mg tablet Take 2 tablets (2,000 mg) by mouth 2 times daily for 1 day 4 tablet 11     rosuvastatin (CRESTOR) 10 MG tablet Take 1 tablet (10 mg) by mouth daily (Patient not taking: Reported on 11/20/2019) 90 tablet 3     Allergies   Allergen Reactions     Atorvastatin Muscle Pain (Myalgia)       Reviewed and updated as needed this visit by clinical staff  Tobacco  Allergies  Soc Hx        Reviewed and updated as needed this visit by Provider        Past Medical History:   Diagnosis Date     Alcohol abuse, unspecified     stopped drinking 2006     Other unspecified back disorder     low back pain      Past Surgical History:    Procedure Laterality Date     ORTHOPEDIC SURGERY      finger fracture repair        Review of Systems  A 10 organ systems ROS is negative.     OBJECTIVE:   /76 (BP Location: Right arm, Patient Position: Chair, Cuff Size: Adult Large)   Pulse 72   Temp 97.6  F (36.4  C) (Tympanic)   Ht 1.829 m (6')   Wt 108.4 kg (239 lb)   SpO2 95%   BMI 32.41 kg/m      Physical Exam  GENERAL: healthy, alert and no distress  EYES: Eyes grossly normal to inspection, PERRL and conjunctivae and sclerae normal  HENT: ear canals and TM's normal, nose and mouth without ulcers or lesions  NECK: no adenopathy, no asymmetry, masses, or scars and thyroid normal to palpation  RESP: lungs clear to auscultation - no rales, rhonchi or wheezes  CV: regular rate and rhythm, normal S1 S2, no S3 or S4, no murmur, click or rub, no peripheral edema and peripheral pulses strong  ABDOMEN: soft, nontender, no hepatosplenomegaly, no masses and bowel sounds normal  RECTAL: exam declined as patient plans to have colonoscopy soon and PSA will be checked   MS: no gross musculoskeletal defects noted, no edema  SKIN: no suspicious lesions or rashes  NEURO: Normal strength and tone, mentation intact and speech normal  PSYCH: mentation appears normal, affect normal/bright    Labs pending     ASSESSMENT/PLAN:   1. Routine general medical examination at a health care facility      2. Hyperlipidemia LDL goal <100  He never started Crestor  Discussion about rationale for primary prevention again and common side effects and risks  He thinks he will start it, but want to check his lipids again today   - rosuvastatin (CRESTOR) 10 MG tablet; Take 1 tablet (10 mg) by mouth daily  Dispense: 90 tablet; Refill: 3    3. RASHID (obstructive sleep apnea)  He is struggling to adjust his CPAP properly with his sleep doctor, but will keep working at it     4. Nonintractable headache, unspecified chronicity pattern, unspecified headache type  Unclear etiology; onset of  headache after age 59, needs MRI to exclude intracranial pathology ; untreated RASHID could be a contributing factor and we discussed this   - MR Brain w/o & w Contrast; Future  - OFFICE/OUTPT VISIT,EST,LEVL III    5. Male erectile disorder      6. Screening for colon cancer    - GASTROENTEROLOGY ADULT REF PROCEDURE ONLY Shailesh Allison [phone]    7. Prostate cancer screening      8. Recurrent cold sores    - valACYclovir (VALTREX) 1000 mg tablet; Take 2 tablets (2,000 mg) by mouth 2 times daily for 1 day  Dispense: 4 tablet; Refill: 11  - OFFICE/OUTPT VISIT,ESTLEVL III    COUNSELING:   Reviewed preventive health counseling, as reflected in patient instructions  Special attention given to:        Regular exercise       Healthy diet/nutrition       Immunizations     flu shot today             Consider Hep C screening for patients born between 1945 and 1965; done       HIV screeninx in teen years, 1x in adult years, and at intervals if high risk; done       Colon cancer screening; referred for exam; he is due; plus his father was diagnosed with colon cancer age 83       Prostate cancer screening; PSA today        Consider lung cancer screening for ages 55-80 years and 30 pack-year smoking history  ; never smoker     Estimated body mass index is 32.41 kg/m  as calculated from the following:    Height as of this encounter: 1.829 m (6').    Weight as of this encounter: 108.4 kg (239 lb).     Weight management plan: Discussed healthy diet and exercise guidelines     reports that he has never smoked. He has never used smokeless tobacco.      Counseling Resources:  ATP IV Guidelines  Pooled Cohorts Equation Calculator  FRAX Risk Assessment  ICSI Preventive Guidelines  Dietary Guidelines for Americans,   USDA's MyPlate  ASA Prophylaxis  Lung CA Screening    Mike Mathur MD  [facility]

## 2019-11-21 NOTE — RESULT ENCOUNTER NOTE
"The following letter pertains to your most recent diagnostic tests:    -Your total cholesterol is 246 which is above your goal of total cholesterol less than 200.    -Your triglycerides are 150 which are at your goal of triglycerides less than 150.    -Your HDL or \"good cholesterol\" is 44 which is at your goal of HDL cholesterol greater than 40.    -Your LDL cholesterol or \"bad cholesterol\" is 172 which is above your goal of LDL cholesterol less than <130.  Your LDL goal is based on your risk factors for artery disease.     -Liver and gallbladder tests are normal for you. (ALT,AST, Alk phos, bilirubin), kidney function is normal for you (Creatinine, GFR), Sodium is normal, Potassium is normal for you, Calcium is normal for you, Glucose (blood sugar) is moderately elevated but not in the diabetic range.    -Your prostate specific antigen (PSA) test result returned normal.     -Your complete blood counts including your hemoglobin returned normal for you.       Bottom line:  Based on these results I would recommend that you start the rosuvastatin to treat your cholesterol.  We can recheck your cholesterol after you have been taking the rosuvastatin for 2 months.  I will be in touch with you about the results of your MRI when I receive them.      If you are having new back symptoms, I would recommend that you schedule a follow-up appointment with me to evaluate the symptoms prior to obtaining an MRI.          Sincerely,    Dr. Mathur    The 10-year ASCVD risk score (Evi DAVIDSON Jr., et al., 2013) is: 10.5%    Values used to calculate the score:      Age: 59 years      Sex: Male      Is Non- : No      Diabetic: No      Tobacco smoker: No      Systolic Blood Pressure: 122 mmHg      Is BP treated: No      HDL Cholesterol: 44 mg/dL      Total Cholesterol: 246 mg/dL"

## 2020-01-06 ENCOUNTER — HOSPITAL ENCOUNTER (OUTPATIENT)
Facility: CLINIC | Age: 60
Discharge: HOME | End: 2020-01-06
Attending: COLON & RECTAL SURGERY | Admitting: COLON & RECTAL SURGERY
Payer: COMMERCIAL

## 2020-01-06 VITALS
WEIGHT: 230 LBS | RESPIRATION RATE: 20 BRPM | DIASTOLIC BLOOD PRESSURE: 81 MMHG | HEIGHT: 72 IN | SYSTOLIC BLOOD PRESSURE: 114 MMHG | BODY MASS INDEX: 31.15 KG/M2 | OXYGEN SATURATION: 93 % | HEART RATE: 59 BPM

## 2020-01-06 LAB — COLONOSCOPY: NORMAL

## 2020-01-06 PROCEDURE — 88305 TISSUE EXAM BY PATHOLOGIST: CPT | Mod: 26 | Performed by: COLON & RECTAL SURGERY

## 2020-01-06 PROCEDURE — 45385 COLONOSCOPY W/LESION REMOVAL: CPT | Mod: PT | Performed by: COLON & RECTAL SURGERY

## 2020-01-06 PROCEDURE — G0500 MOD SEDAT ENDO SERVICE >5YRS: HCPCS | Performed by: COLON & RECTAL SURGERY

## 2020-01-06 PROCEDURE — 25000128 H RX IP 250 OP 636: Performed by: COLON & RECTAL SURGERY

## 2020-01-06 PROCEDURE — 88305 TISSUE EXAM BY PATHOLOGIST: CPT | Performed by: COLON & RECTAL SURGERY

## 2020-01-06 RX ORDER — FENTANYL CITRATE 50 UG/ML
INJECTION, SOLUTION INTRAMUSCULAR; INTRAVENOUS PRN
Status: DISCONTINUED | OUTPATIENT
Start: 2020-01-06 | End: 2020-01-06 | Stop reason: HOSPADM

## 2020-01-06 RX ORDER — ONDANSETRON 2 MG/ML
4 INJECTION INTRAMUSCULAR; INTRAVENOUS
Status: DISCONTINUED | OUTPATIENT
Start: 2020-01-06 | End: 2020-01-06 | Stop reason: HOSPADM

## 2020-01-06 RX ORDER — LIDOCAINE 40 MG/G
CREAM TOPICAL
Status: DISCONTINUED | OUTPATIENT
Start: 2020-01-06 | End: 2020-01-06 | Stop reason: HOSPADM

## 2020-01-06 ASSESSMENT — MIFFLIN-ST. JEOR: SCORE: 1891.27

## 2020-01-06 NOTE — H&P
Colon & Rectal Surgery History and Physical  Pre-Endoscopy Procedure Note    History of Present Illness   I have been asked by Dr. Mathur to evaluate this 59 year old male for colorectal cancer screening. He had a normal colonoscopy in April 2010 and oliviaenlty denies any abdominal pain, weight loss, bleeding per rectum, or recent change in bowel habits.    Past Medical History  Diagnosis Date     Alcohol abuse, unspecified     stopped drinking 2006     Low back pain      Sleep apnea        Past Surgical History  Procedure Laterality Date     ORTHOPEDIC SURGERY      finger fracture repair      VASECTOMY       [place] teeth extraction          Medications  Medication Sig     rosuvastatin (CRESTOR) 10 MG tablet Take 1 tablet (10 mg) by mouth daily     sildenafil (VIAGRA) 50 MG tablet Take 0.5 tablets (25 mg) by mouth daily as needed (erectile dysfunction) 30 min to 4 hrs before sex. Do not use with nitroglycerin, terazosin or doxazosin.     valACYclovir (VALTREX) 1000 mg tablet Take 2 tablets (2,000 mg) by mouth 2 times daily for 1 day       Allergies  Allergen Reactions     Atorvastatin Muscle Pain (Myalgia)        Family History   family history includes Breast Cancer in his mother; Diabetes in his father; Hypertension in his mother.     Social History    She reports that he has never smoked. He has never used smokeless tobacco. He reports that he does not drink alcohol or use drugs.    Review of Systems   Constitutional:  No fever, weight change or fatigue.    Eyes:     No dry eyes or vision changes.   Ears/Nose/Throat/Neck:  No oral ulcers, sore throat or voice change.    Cardiovascular:   No palpitations, syncope, angina or edema.   Respiratory:    No chest pain, excessive sleepiness, shortness of breath or hemoptysis.    Gastrointestinal:   No abdominal pain, nausea, vomiting, diarrhea or heartburn.    Genitourinary:   No dysuria, hematuria, urinary retention or urinary frequency.   Musculoskeletal:  No joint  swelling or arthralgias.    Dermatologic:  No skin rash or other skin changes.   Neurologic:    No focal weakness or numbness. No neuropathy.   Psychiatric:    No depression, anxiety, suicidal ideation, or paranoid ideation.   Endocrine:   No cold or heat intolerance, polydipsia, hirsutism, change in libido, or flushing.   Hematology/Lymphatic:  No bleeding or lymphadenopathy.    Allergy/Immunology:  No rhinitis or hives.     Physical Exam   Vitals:  BP (!) 138/95, RR 12, HR 64 height 1.829 m (6'), weight 104.3 kg (230 lb), SpO2 98 %.    General:  Alert and oriented to person, place and time   Airway: Normal oropharyngeal airway and neck mobility   Lungs:  Clear bilaterally   Heart:  Regular rate and rhythm   Abdomen: Soft, NT, ND, no masses   Rectal:  Perianal skin without excoriation, hemorrhoidal disease or anal fissure        Digital rectal examination reveals normal sphincter tone without masses    ASA Grade: II (mild systemic disease)    Impression: Cleared for use of conscious sedation for colorectal cancer screening    Plan: Proceed with colonoscopy     Cristela Boland MD, MD  [facility]  [phone]

## 2020-01-07 LAB — COPATH REPORT: NORMAL

## 2020-07-21 NOTE — MR AVS SNAPSHOT
After Visit Summary   12/13/2017    Darrell Regalado    MRN: [id number]           Patient Information     Date Of Birth          1960        Visit Information        Provider Department      12/13/2017 1:30 PM Marshall Catalan MD [facility] [place]        Today's Diagnoses     RASHID (obstructive sleep apnea)    -  1      Care Instructions      Your BMI is Body mass index is 31.93 kg/(m^2).  Weight management is a personal decision.  If you are interested in exploring weight loss strategies, the following discussion covers the approaches that may be successful. Body mass index (BMI) is one way to tell whether you are at a healthy weight, overweight, or obese. It measures your weight in relation to your height.  A BMI of 18.5 to 24.9 is in the healthy range. A person with a BMI of 25 to 29.9 is considered overweight, and someone with a BMI of 30 or greater is considered obese. More than two-thirds of American adults are considered overweight or obese.  Being overweight or obese increases the risk for further weight gain. Excess weight may lead to heart disease and diabetes.  Creating and following plans for healthy eating and physical activity may help you improve your health.  Weight control is part of healthy lifestyle and includes exercise, emotional health, and healthy eating habits. Careful eating habits lifelong are the mainstay of weight control. Though there are significant health benefits from weight loss, long-term weight loss with diet alone may be very difficult to achieve- studies show long-term success with dietary management in less than 10% of people. Attaining a healthy weight may be especially difficult to achieve in those with severe obesity. In some cases, medications, devices and surgical management might be considered.  What can you do?  If you are overweight or obese and are interested in methods for weight loss, you should discuss this with your provider.     Consider  Review of Systems/Medical History  Patient summary reviewed  Chart reviewed      Cardiovascular  EKG reviewed, Negative cardio ROS Hypertension , Past MI > 6 months,   Comment: 2017 - normal biV systolic function, mild AS,  Pulmonary  Negative pulmonary ROS Smoker cigarette smoker  , Tobacco cessation counseling given , Pneumonia, COPD ,        GI/Hepatic  Negative GI/hepatic ROS   PUD, Liver disease , Hepatitis ,   Comment: etOH abuse     Negative  ROS        Endo/Other  Negative endo/other ROS      GYN  Negative gynecology ROS          Hematology  Negative hematology ROS Anemia ,    Comment: Chronic anemia Musculoskeletal  Negative musculoskeletal ROS        Neurology  Negative neurology ROS Seizures (on keppra) ,     Psychology   Negative psychology ROS Depression ,     Comment: etoh abuse  H/O suicide attempt         Physical Exam    Airway    Mallampati score: II  TM Distance: >3 FB  Neck ROM: full     Dental   lower dentures and upper dentures,     Cardiovascular  Comment: Negative ROS, Rhythm: regular, Rate: normal, Cardiovascular exam normal    Pulmonary  Pulmonary exam normal Breath sounds clear to auscultation,     Other Findings        Anesthesia Plan  ASA Score- 3 Emergent    Anesthesia Type- IV sedation with anesthesia with ASA Monitors  Additional Monitors:   Airway Plan:         Plan Factors-Patient not instructed to abstain from smoking on day of procedure  Patient did not smoke on day of surgery  Induction- intravenous  Postoperative Plan-     Informed Consent- Anesthetic plan and risks discussed with patient  I personally reviewed this patient with the CRNA  Discussed and agreed on the Anesthesia Plan with the CRNA  Monica Kraus reducing daily calorie intake by 500 calories.     Keep a food journal.     Avoiding skipping meals, consider cutting portions instead.    Diet combined with exercise helps maintain muscle while optimizing fat loss. Strength training is particularly important for building and maintaining muscle mass. Exercise helps reduce stress, increase energy, and improves fitness. Increasing exercise without diet control, however, may not burn enough calories to loose weight.       Start walking three days a week 10-20 minutes at a time    Work towards walking thirty minutes five days a week     Eventually, increase the speed of your walking for 1-2 minutes at time    In addition, we recommend that you review healthy lifestyles and methods for weight loss available through the National Institutes of Health patient information sites:  http://win.niddk.nih.gov/publications/index.htm    And look into health and wellness programs that may be available through your health insurance provider, employer, local community center, or karen club.    Weight management plan: Patient was referred to their PCP to discuss a diet and exercise plan.            Follow-ups after your visit        Who to contact     If you have questions or need follow up information about today's clinic visit or your schedule please contact [facility] [place] directly at [phone].  Normal or non-critical lab and imaging results will be communicated to you by MyChart, letter or phone within 4 business days after the clinic has received the results. If you do not hear from us within 7 days, please contact the clinic through Medig[facility] or phone. If you have a critical or abnormal lab result, we will notify you by phone as soon as possible.  Submit refill requests through [employer]ier or call your pharmacy and they will forward the refill request to us. Please allow 3 business days for your refill to be completed.          Additional Information About Your Visit    "     MyChart Information     [employer] lets you send messages to your doctor, view your test results, renew your prescriptions, schedule appointments and more. To sign up, go to [email or web address] . Click on \"Log in\" on the left side of the screen, which will take you to the Welcome page. Then click on \"Sign up Now\" on the right side of the page.     You will be asked to enter the access code listed below, as well as some personal information. Please follow the directions to create your username and password.     Your access code is: [email or web address]  Expires: 2017  9:04 AM     Your access code will  in 90 days. If you need help or a new code, please call your [facility] clinic or [phone].        Care EveryWhere ID     This is your [facility] EveryWhere ID. This could be used by other organizations to access your [facility] medical records  [id number]        Your Vitals Were     Pulse Respirations Height Pulse Oximetry BMI (Body Mass Index)       71 18 1.829 m (6') 97% 31.93 kg/m2        Blood Pressure from Last 3 Encounters:   17 135/88   10/11/17 132/80   17 114/79    Weight from Last 3 Encounters:   17 106.8 kg (235 lb 6.4 oz)   10/11/17 105.3 kg (232 lb 3.2 oz)   17 101.6 kg (224 lb)              We Performed the Following     Comprehensive DME        Primary Care Provider Office Phone # Fax #    Mike Mathur [phone]       [facility] [address]  [place] [address]        Equal Access to Services     STEVEN REDDY : Hadii fidencio payton Sodalia, waaxda luqadaha, qaybta kaalmada adeegyada, ramona asencio. So [facility] [phone].    ATENCIÓN: Si habla español, tiene a cartagena disposición servicios gratuitos de asistencia lingüística. Llame al [phone].    We comply with applicable federal civil rights laws and [place] laws. We do not discriminate on the basis of race, color, national origin, age, disability, " sex, sexual orientation, or gender identity.            Thank you!     Thank you for choosing [facility]  for your care. Our goal is always to provide you with excellent care. Hearing back from our patients is one way we can continue to improve our services. Please take a few minutes to complete the written survey that you may receive in the mail after your visit with us. Thank you!             Your Updated Medication List - Protect others around you: Learn how to safely use, store and throw away your medicines at www.disposemymeds.org.          This list is accurate as of: 12/13/17  1:52 PM.  Always use your most recent med list.                   Brand Name Dispense Instructions for use Diagnosis    sildenafil 20 MG tablet    REVATIO    12 tablet    Take 1 tablet (20 mg) by mouth daily as needed    Male erectile disorder

## 2020-08-22 DIAGNOSIS — N52.9 MALE ERECTILE DISORDER: ICD-10-CM

## 2020-08-24 RX ORDER — SILDENAFIL 50 MG/1
TABLET, FILM COATED ORAL
Qty: 30 TABLET | Refills: 0 | Status: SHIPPED | OUTPATIENT
Start: 2020-08-24 | End: 2022-07-14

## 2020-11-23 DIAGNOSIS — E78.5 HYPERLIPIDEMIA LDL GOAL <100: ICD-10-CM

## 2020-11-23 NOTE — TELEPHONE ENCOUNTER
Next 5 appointments (look out 90 days)    Dec 23, 2020  7:30 AM  PHYSICAL with Mike Mathur MD  [facility] [address] [facility] [address] [address]  [phone]        RT Courtney (R)

## 2020-11-24 RX ORDER — ROSUVASTATIN CALCIUM 10 MG/1
10 TABLET, COATED ORAL DAILY
Qty: 30 TABLET | Refills: 0 | Status: SHIPPED | OUTPATIENT
Start: 2020-11-24 | End: 2020-12-23

## 2020-12-23 ENCOUNTER — OFFICE VISIT (OUTPATIENT)
Dept: FAMILY MEDICINE | Facility: CLINIC | Age: 60
End: 2020-12-23
Payer: COMMERCIAL

## 2020-12-23 VITALS
BODY MASS INDEX: 32.33 KG/M2 | HEIGHT: 72 IN | SYSTOLIC BLOOD PRESSURE: 132 MMHG | OXYGEN SATURATION: 96 % | WEIGHT: 238.7 LBS | DIASTOLIC BLOOD PRESSURE: 87 MMHG | TEMPERATURE: 97.8 F | HEART RATE: 72 BPM

## 2020-12-23 DIAGNOSIS — B00.1 RECURRENT COLD SORES: ICD-10-CM

## 2020-12-23 DIAGNOSIS — E78.5 HYPERLIPIDEMIA LDL GOAL <100: ICD-10-CM

## 2020-12-23 DIAGNOSIS — Z00.00 ROUTINE GENERAL MEDICAL EXAMINATION AT A HEALTH CARE FACILITY: Primary | ICD-10-CM

## 2020-12-23 DIAGNOSIS — G47.33 OSA (OBSTRUCTIVE SLEEP APNEA): ICD-10-CM

## 2020-12-23 DIAGNOSIS — N52.9 MALE ERECTILE DISORDER: ICD-10-CM

## 2020-12-23 DIAGNOSIS — Z12.5 PROSTATE CANCER SCREENING: ICD-10-CM

## 2020-12-23 LAB
ALBUMIN SERPL-MCNC: 3.9 G/DL (ref 3.4–5)
ALP SERPL-CCNC: 96 U/L (ref 40–150)
ALT SERPL W P-5'-P-CCNC: 42 U/L (ref 0–70)
ANION GAP SERPL CALCULATED.3IONS-SCNC: 5 MMOL/L (ref 3–14)
AST SERPL W P-5'-P-CCNC: 18 U/L (ref 0–45)
BILIRUB SERPL-MCNC: 0.8 MG/DL (ref 0.2–1.3)
BUN SERPL-MCNC: 21 MG/DL (ref 7–30)
CALCIUM SERPL-MCNC: 9.1 MG/DL (ref 8.5–10.1)
CHLORIDE SERPL-SCNC: 108 MMOL/L (ref 94–109)
CHOLEST SERPL-MCNC: 192 MG/DL
CO2 SERPL-SCNC: 27 MMOL/L (ref 20–32)
CREAT SERPL-MCNC: 1.05 MG/DL (ref 0.66–1.25)
ERYTHROCYTE [DISTWIDTH] IN BLOOD BY AUTOMATED COUNT: 14.9 % (ref 10–15)
GFR SERPL CREATININE-BSD FRML MDRD: 76 ML/MIN/{1.73_M2}
GLUCOSE SERPL-MCNC: 117 MG/DL (ref 70–99)
HCT VFR BLD AUTO: 43.4 % (ref 40–53)
HDLC SERPL-MCNC: 42 MG/DL
HGB BLD-MCNC: 14.2 G/DL (ref 13.3–17.7)
LDLC SERPL CALC-MCNC: 119 MG/DL
MCH RBC QN AUTO: 28.3 PG (ref 26.5–33)
MCHC RBC AUTO-ENTMCNC: 32.7 G/DL (ref 31.5–36.5)
MCV RBC AUTO: 87 FL (ref 78–100)
NONHDLC SERPL-MCNC: 150 MG/DL
PLATELET # BLD AUTO: 233 10E9/L (ref 150–450)
POTASSIUM SERPL-SCNC: 4.3 MMOL/L (ref 3.4–5.3)
PROT SERPL-MCNC: 7.1 G/DL (ref 6.8–8.8)
PSA SERPL-ACNC: 0.96 UG/L (ref 0–4)
RBC # BLD AUTO: 5.02 10E12/L (ref 4.4–5.9)
SODIUM SERPL-SCNC: 140 MMOL/L (ref 133–144)
TRIGL SERPL-MCNC: 157 MG/DL
WBC # BLD AUTO: 7.7 10E9/L (ref 4–11)

## 2020-12-23 PROCEDURE — 80053 COMPREHEN METABOLIC PANEL: CPT | Performed by: INTERNAL MEDICINE

## 2020-12-23 PROCEDURE — G0103 PSA SCREENING: HCPCS | Performed by: INTERNAL MEDICINE

## 2020-12-23 PROCEDURE — 85027 COMPLETE CBC AUTOMATED: CPT | Performed by: INTERNAL MEDICINE

## 2020-12-23 PROCEDURE — 99396 PREV VISIT EST AGE 40-64: CPT | Performed by: INTERNAL MEDICINE

## 2020-12-23 PROCEDURE — 36415 COLL VENOUS BLD VENIPUNCTURE: CPT | Performed by: INTERNAL MEDICINE

## 2020-12-23 PROCEDURE — 80061 LIPID PANEL: CPT | Performed by: INTERNAL MEDICINE

## 2020-12-23 RX ORDER — VALACYCLOVIR HYDROCHLORIDE 1 G/1
2000 TABLET, FILM COATED ORAL 2 TIMES DAILY
Qty: 4 TABLET | Refills: 11 | Status: SHIPPED | OUTPATIENT
Start: 2020-12-23 | End: 2021-10-25

## 2020-12-23 RX ORDER — ROSUVASTATIN CALCIUM 10 MG/1
10 TABLET, COATED ORAL DAILY
Qty: 90 TABLET | Refills: 3 | Status: SHIPPED | OUTPATIENT
Start: 2020-12-23 | End: 2020-12-23

## 2020-12-23 RX ORDER — ROSUVASTATIN CALCIUM 20 MG/1
20 TABLET, COATED ORAL DAILY
Qty: 90 TABLET | Refills: 3 | Status: SHIPPED | OUTPATIENT
Start: 2020-12-23 | End: 2022-02-14

## 2020-12-23 ASSESSMENT — MIFFLIN-ST. JEOR: SCORE: 1921.49

## 2020-12-23 NOTE — RESULT ENCOUNTER NOTE
"The following letter pertains to your most recent diagnostic tests:    -Liver and gallbladder tests are normal for you. (ALT,AST, Alk phos, bilirubin), kidney function is normal for you (Creatinine, GFR), Sodium is normal, Potassium is normal for you, Calcium is normal for you.    -Glucose (blood sugar) is moderately elevated, but NOT in the diabetic range.       -Your total cholesterol is 192 which is at your goal of total cholesterol less than 200.    -Your triglycerides are 157 which are just above your goal of triglycerides less than 150.    -Your HDL or \"good cholesterol\" is 42 which is at your goal of HDL cholesterol greater than 40.    -Your LDL cholesterol or \"bad cholesterol\" is 119 which is above your goal of LDL cholesterol less than <100.  Your LDL goal is based on your risk factors for artery disease.     -Your prostate specific antigen (PSA) test result returned normal.     -Your complete blood counts including your hemoglobin returned normal for you.         Bottom line:  The cholesterol is much better than last check.  I would recommend that we try to increase the Crestor (rosuvastatin) dose form 10 mg daily to 20 mg daily.  This is likely to help get the LDL 'bad cholesterol' down to below 100 and will improve triglycerides slightly as well.  Any weight loss that you can achieve will help your blood sugar and triglycerides.   I sent an prescription for the higher dose of Crestor (rosuvastatin) to your pharmacy.   I recommend a lab appointment in one month after starting the new dose of Crestor (rosuvastatin) to recheck the cholesterol on the higher dose.         Follow up:  Lab appointment in one month to recheck cholesterol on higher dose of Crestor (rosuvastatin).        Sincerely,    Dr. Mathur"

## 2020-12-23 NOTE — PROGRESS NOTES
SUBJECTIVE:   CC: Darrell Regalado is an 60 year old male who presents for preventative health visit.     Patient has been advised of split billing requirements and indicates understanding: Yes  Healthy Habits:     Getting at least 3 servings of Calcium per day:  Yes    Bi-annual eye exam:  Yes    Dental care twice a year:  Yes    Sleep apnea or symptoms of sleep apnea:  Sleep apnea    Diet:  Regular (no restrictions)    Frequency of exercise:  None    Duration of exercise:  N/A    Taking medications regularly:  Yes    Barriers to taking medications:  None    Medication side effects:  None    PHQ-2 Total Score: 0    Additional concerns today:  No      Today's PHQ-2 Score:   PHQ-2 ( 1999 Pfizer) 12/23/2020   Q1: Little interest or pleasure in doing things 0   Q2: Feeling down, depressed or hopeless 0   PHQ-2 Score 0   Q1: Little interest or pleasure in doing things -   Q2: Feeling down, depressed or hopeless -   PHQ-2 Score -       Abuse: Current or Past(Physical, Sexual or Emotional)- No  Do you feel safe in your environment? Yes        Social History     Tobacco Use     Smoking status: Never Smoker     Smokeless tobacco: Never Used   Substance Use Topics     Alcohol use: No     Comment: sober since 2006     If you drink alcohol do you typically have >3 drinks per day or >7 drinks per week? No    Alcohol Use 11/20/2019   Prescreen: >3 drinks/day or >7 drinks/week? No       Last PSA:   PSA   Date Value Ref Range Status   11/20/2019 1.13 0 - 4 ug/L Final     Comment:     Assay Method:  Chemiluminescence using Siemens Vista analyzer       Reviewed orders with patient. Reviewed health maintenance and updated orders accordingly - Yes  Patient Active Problem List   Diagnosis     Lumbago     RASHID (obstructive sleep apnea)     Hyperlipidemia LDL goal <100     Male erectile disorder     Recurrent cold sores     Past Surgical History:   Procedure Laterality Date     ORTHOPEDIC SURGERY      finger fracture repair      VASECTOMY   "     wisdom teeth extraction         Social History     Tobacco Use     Smoking status: Never Smoker     Smokeless tobacco: Never Used   Substance Use Topics     Alcohol use: No     Comment: sober since 2006     Family History   Problem Relation Age of Onset     Hypertension Mother      Breast Cancer Mother      Diabetes Father      Cancer - colorectal No family hx of      Prostate Cancer No family hx of          Current Outpatient Medications   Medication Sig Dispense Refill     rosuvastatin (CRESTOR) 10 MG tablet Take 1 tablet (10 mg) by mouth daily - additional refills at next office visit 30 tablet 0     sildenafil (VIAGRA) 50 MG tablet TAKE 1/2 TAB (25MG) BY MOUTH DAILY AS NEEDED  (ERECTILE DYSFUNCTION) 30 MINUTES TO 4 HOURS BEFORE SEX. DO NOT USE WITH NITROGLYCERIN, TERAZOSIN OR DOXAZOSIN 30 tablet 0     valACYclovir (VALTREX) 1000 mg tablet Take 2 tablets (2,000 mg) by mouth 2 times daily for 1 day 4 tablet 11     Allergies   Allergen Reactions     Atorvastatin Muscle Pain (Myalgia)       Reviewed and updated as needed this visit by clinical staff  Tobacco  Allergies  Meds              Reviewed and updated as needed this visit by Provider                Past Medical History:   Diagnosis Date     Alcohol abuse, unspecified     stopped drinking 2006     Other unspecified back disorder     low back pain     Sleep apnea       Past Surgical History:   Procedure Laterality Date     ORTHOPEDIC SURGERY      finger fracture repair      VASECTOMY       wisdom teeth extraction         Review of Systems  A 10 organ systems ROS is negative other than any pertinent positives or negatives previously stated.     OBJECTIVE:   /87 (BP Location: Right arm, Patient Position: Sitting, Cuff Size: Adult Large)   Pulse 72   Temp 97.8  F (36.6  C) (Temporal)   Ht 1.822 m (5' 11.73\")   Wt 108.3 kg (238 lb 11.2 oz)   SpO2 96%   BMI 32.62 kg/m      Physical Exam  GENERAL: healthy, alert and no distress  EYES: Eyes grossly " normal to inspection, PERRL and conjunctivae and sclerae normal  HENT: ear canals and TM's normal, nose and mouth without ulcers or lesions  NECK: no adenopathy, no asymmetry, masses, or scars and thyroid normal to palpation  RESP: lungs clear to auscultation - no rales, rhonchi or wheezes  CV: regular rate and rhythm, normal S1 S2, no S3 or S4, no murmur, click or rub, no peripheral edema and peripheral pulses strong  ABDOMEN: Obese, soft, nontender, no hepatosplenomegaly, no masses and bowel sounds normal  RECTAL: normal sphincter tone, no rectal masses, prostate normal size, smooth, nontender without nodules or masses  MS: no gross musculoskeletal defects noted, no edema  SKIN: no suspicious lesions or rashes  NEURO: Normal strength and tone, mentation intact and speech normal  PSYCH: mentation appears normal, affect normal/bright    Labs pending     ASSESSMENT/PLAN:   1. Routine general medical examination at a health care facility      2. Hyperlipidemia LDL goal <100    - rosuvastatin (CRESTOR) 10 MG tablet; Take 1 tablet (10 mg) by mouth daily - additional refills at next office visit  Dispense: 90 tablet; Refill: 3  - Comprehensive metabolic panel  - CBC with platelets  - Lipid panel reflex to direct LDL Fasting    3. RASHID (obstructive sleep apnea)  He is not tolerating CPAP  Suggested visit with RASHID dentists for consideration of appliance     4. Male erectile disorder      5. Recurrent cold sores    - valACYclovir (VALTREX) 1000 mg tablet; Take 2 tablets (2,000 mg) by mouth 2 times daily  Dispense: 4 tablet; Refill: 11    6. Prostate cancer screening    - Prostate spec antigen screen    Patient has been advised of split billing requirements and indicates understanding: Yes  COUNSELING:   Reviewed preventive health counseling, as reflected in patient instructions  Special attention given to:        Regular exercise       Healthy diet/nutrition       Immunizations    Vaccines              Consider Hep C  "screening for all patients one time for ages 18-79 years       HIV screeninx in teen years, 1x in adult years, and at intervals if high risk       Colon cancer screening; repeat in        Prostate cancer screening; screen PSA today       Consider lung cancer screening for ages 55-80 years and 30 pack-year smoking history  ;     Estimated body mass index is 32.62 kg/m  as calculated from the following:    Height as of this encounter: 1.822 m (5' 11.73\").    Weight as of this encounter: 108.3 kg (238 lb 11.2 oz).     Weight management plan: Discussed healthy diet and exercise guidelines    He reports that he has never smoked. He has never used smokeless tobacco.      Counseling Resources:  ATP IV Guidelines  Pooled Cohorts Equation Calculator  FRAX Risk Assessment  ICSI Preventive Guidelines  Dietary Guidelines for Americans, 2010  USDA's MyPlate  ASA Prophylaxis  Lung CA Screening    Mike Mathur MD  [facility]  "

## 2020-12-23 NOTE — LETTER
"December 23, 2020      Darrell Regalado  [address] [address]        Dear ,    The following letter pertains to your most recent diagnostic tests:     -Liver and gallbladder tests are normal for you. (ALT,AST, Alk phos, bilirubin), kidney function is normal for you (Creatinine, GFR), Sodium is normal, Potassium is normal for you, Calcium is normal for you.     -Glucose (blood sugar) is moderately elevated, but NOT in the diabetic range.       -Your total cholesterol is 192 which is at your goal of total cholesterol less than 200.     -Your triglycerides are 157 which are just above your goal of triglycerides less than 150.     -Your HDL or \"good cholesterol\" is 42 which is at your goal of HDL cholesterol greater than 40.     -Your LDL cholesterol or \"bad cholesterol\" is 119 which is above your goal of LDL cholesterol less than <100.  Your LDL goal is based on your risk factors for artery disease.     -Your prostate specific antigen (PSA) test result returned normal.     -Your complete blood counts including your hemoglobin returned normal for you.         Bottom line:  The cholesterol is much better than last check.  I would recommend that we try to increase the Crestor (rosuvastatin) dose form 10 mg daily to 20 mg daily.  This is likely to help get the LDL 'bad cholesterol' down to below 100 and will improve triglycerides slightly as well.  Any weight loss that you can achieve will help your blood sugar and triglycerides.   I sent an prescription for the higher dose of Crestor (rosuvastatin) to your pharmacy.   I recommend a lab appointment in one month after starting the new dose of Crestor (rosuvastatin) to recheck the cholesterol on the higher dose.         Follow up:  Lab appointment in one month to recheck cholesterol on higher dose of Crestor (rosuvastatin).       Resulted Orders   Comprehensive metabolic panel   Result Value Ref Range    Sodium 140 133 - 144 mmol/L    " Potassium 4.3 3.4 - 5.3 mmol/L    Chloride 108 94 - 109 mmol/L    Carbon Dioxide 27 20 - 32 mmol/L    Anion Gap 5 3 - 14 mmol/L    Glucose 117 (H) 70 - 99 mg/dL    Urea Nitrogen 21 7 - 30 mg/dL    Creatinine 1.05 0.66 - 1.25 mg/dL    GFR Estimate 76 >60 mL/min/[1.73_m2]      Comment:      Non  GFR Calc  Starting 12/18/2018, serum creatinine based estimated GFR (eGFR) will be   calculated using the Chronic Kidney Disease Epidemiology Collaboration   (CKD-EPI) equation.      GFR Estimate If Black 88 >60 mL/min/[1.73_m2]      Comment:       GFR Calc  Starting 12/18/2018, serum creatinine based estimated GFR (eGFR) will be   calculated using the Chronic Kidney Disease Epidemiology Collaboration   (CKD-EPI) equation.      Calcium 9.1 8.5 - 10.1 mg/dL    Bilirubin Total 0.8 0.2 - 1.3 mg/dL    Albumin 3.9 3.4 - 5.0 g/dL    Protein Total 7.1 6.8 - 8.8 g/dL    Alkaline Phosphatase 96 40 - 150 U/L    ALT 42 0 - 70 U/L    AST 18 0 - 45 U/L   CBC with platelets   Result Value Ref Range    WBC 7.7 4.0 - 11.0 10e9/L    RBC Count 5.02 4.4 - 5.9 10e12/L    Hemoglobin 14.2 13.3 - 17.7 g/dL    Hematocrit 43.4 40.0 - 53.0 %    MCV 87 78 - 100 fl    MCH 28.3 26.5 - 33.0 pg    MCHC 32.7 31.5 - 36.5 g/dL    RDW 14.9 10.0 - 15.0 %    Platelet Count 233 150 - 450 10e9/L   Lipid panel reflex to direct LDL Fasting   Result Value Ref Range    Cholesterol 192 <200 mg/dL    Triglycerides 157 (H) <150 mg/dL      Comment:      Borderline high:  150-199 mg/dl  High:             200-499 mg/dl  Very high:       >499 mg/dl      HDL Cholesterol 42 >39 mg/dL    LDL Cholesterol Calculated 119 (H) <100 mg/dL      Comment:      Above desirable:  100-129 mg/dl  Borderline High:  130-159 mg/dL  High:             160-189 mg/dL  Very high:       >189 mg/dl      Non HDL Cholesterol 150 (H) <130 mg/dL      Comment:      Above Desirable:  130-159 mg/dl  Borderline high:  160-189 mg/dl  High:             190-219 mg/dl  Very high:        >219 mg/dl     Prostate spec antigen screen   Result Value Ref Range    PSA 0.96 0 - 4 ug/L      Comment:      Assay Method:  Chemiluminescence using Siemens Vista analyzer       Sincerely,      Mike Mathur MD

## 2021-01-13 ENCOUNTER — TELEPHONE (OUTPATIENT)
Dept: FAMILY MEDICINE | Facility: CLINIC | Age: 61
End: 2021-01-13

## 2021-01-13 NOTE — TELEPHONE ENCOUNTER
Reason for Call:  Form, our goal is to have forms completed with 72 hours, however, some forms may require a visit or additional information.    Type of letter, form or note:  Biometric screening form    Who is the form from?: [employer] (if other please explain)    Where did the form come from: form was mailed in    What clinic location was the form placed at?: [facility]    Where the form was placed: Placed on Dr. Mathur's desk    What number is listed as a contact on the form?: [phone]       Additional comments: Request is to complete form with attached data that we sent from Pt's file. Any questions contact Sejal Regalado [phone]    Call taken on 1/13/2021 at 12:40 PM by Jody Alva

## 2021-01-14 NOTE — TELEPHONE ENCOUNTER
Form completed and emailed. Original mailed back to patient, copy placed in brown file.   Sonja Bal, CMA

## 2021-10-22 DIAGNOSIS — B00.1 RECURRENT COLD SORES: ICD-10-CM

## 2021-10-22 NOTE — TELEPHONE ENCOUNTER
Reason for Call:  Medication or medication refill:    Do you use a [facility]?  Name of the pharmacy and phone number for the current request:     [id number] [address] AT [facility] [address]      Name of the medication requested: valACYclovir (VALTREX) 1000 mg tablet     Other request:     Can we leave a detailed message on this number? YES    Phone number patient can be reached at: Home number on file [phone] (home)    Best Time:     Call taken on 10/22/2021 at 7:43 AM by Kala Gomez

## 2021-10-25 RX ORDER — VALACYCLOVIR HYDROCHLORIDE 1 G/1
2000 TABLET, FILM COATED ORAL 2 TIMES DAILY
Qty: 4 TABLET | Refills: 3 | Status: SHIPPED | OUTPATIENT
Start: 2021-10-25 | End: 2022-02-01

## 2021-10-25 NOTE — TELEPHONE ENCOUNTER
Prescription approved per [facility] Refill Protocol.    Beverly OROZCO RN  EP Triage

## 2022-01-19 ENCOUNTER — TELEPHONE (OUTPATIENT)
Dept: FAMILY MEDICINE | Facility: CLINIC | Age: 62
End: 2022-01-19

## 2022-01-19 ENCOUNTER — OFFICE VISIT (OUTPATIENT)
Dept: FAMILY MEDICINE | Facility: CLINIC | Age: 62
End: 2022-01-19
Payer: COMMERCIAL

## 2022-01-19 VITALS
HEART RATE: 70 BPM | BODY MASS INDEX: 30.75 KG/M2 | RESPIRATION RATE: 12 BRPM | SYSTOLIC BLOOD PRESSURE: 120 MMHG | OXYGEN SATURATION: 100 % | TEMPERATURE: 98.4 F | HEIGHT: 72 IN | WEIGHT: 227 LBS | DIASTOLIC BLOOD PRESSURE: 82 MMHG

## 2022-01-19 DIAGNOSIS — G47.33 OSA (OBSTRUCTIVE SLEEP APNEA): ICD-10-CM

## 2022-01-19 DIAGNOSIS — N52.9 MALE ERECTILE DISORDER: ICD-10-CM

## 2022-01-19 DIAGNOSIS — B00.1 RECURRENT COLD SORES: ICD-10-CM

## 2022-01-19 DIAGNOSIS — Z12.11 SCREEN FOR COLON CANCER: ICD-10-CM

## 2022-01-19 DIAGNOSIS — M54.50 LOW BACK PAIN WITHOUT SCIATICA, UNSPECIFIED BACK PAIN LATERALITY, UNSPECIFIED CHRONICITY: ICD-10-CM

## 2022-01-19 DIAGNOSIS — Z00.00 ROUTINE GENERAL MEDICAL EXAMINATION AT A HEALTH CARE FACILITY: Primary | ICD-10-CM

## 2022-01-19 DIAGNOSIS — E78.5 HYPERLIPIDEMIA LDL GOAL <100: ICD-10-CM

## 2022-01-19 DIAGNOSIS — Z12.5 PROSTATE CANCER SCREENING: ICD-10-CM

## 2022-01-19 DIAGNOSIS — R97.20 ELEVATED PROSTATE SPECIFIC ANTIGEN (PSA): ICD-10-CM

## 2022-01-19 LAB
ALBUMIN SERPL-MCNC: 3.8 G/DL (ref 3.4–5)
ALP SERPL-CCNC: 78 U/L (ref 40–150)
ALT SERPL W P-5'-P-CCNC: 38 U/L (ref 0–70)
ANION GAP SERPL CALCULATED.3IONS-SCNC: 2 MMOL/L (ref 3–14)
AST SERPL W P-5'-P-CCNC: 13 U/L (ref 0–45)
BILIRUB SERPL-MCNC: 0.6 MG/DL (ref 0.2–1.3)
BUN SERPL-MCNC: 20 MG/DL (ref 7–30)
CALCIUM SERPL-MCNC: 8.9 MG/DL (ref 8.5–10.1)
CHLORIDE BLD-SCNC: 110 MMOL/L (ref 94–109)
CHOLEST SERPL-MCNC: 220 MG/DL
CO2 SERPL-SCNC: 29 MMOL/L (ref 20–32)
CREAT SERPL-MCNC: 0.96 MG/DL (ref 0.66–1.25)
ERYTHROCYTE [DISTWIDTH] IN BLOOD BY AUTOMATED COUNT: 13.2 % (ref 10–15)
FASTING STATUS PATIENT QL REPORTED: YES
GFR SERPL CREATININE-BSD FRML MDRD: 89 ML/MIN/1.73M2
GLUCOSE BLD-MCNC: 122 MG/DL (ref 70–99)
HCT VFR BLD AUTO: 45.2 % (ref 40–53)
HDLC SERPL-MCNC: 37 MG/DL
HGB BLD-MCNC: 15 G/DL (ref 13.3–17.7)
LDLC SERPL CALC-MCNC: 152 MG/DL
MCH RBC QN AUTO: 31.7 PG (ref 26.5–33)
MCHC RBC AUTO-ENTMCNC: 33.2 G/DL (ref 31.5–36.5)
MCV RBC AUTO: 96 FL (ref 78–100)
NONHDLC SERPL-MCNC: 183 MG/DL
PLATELET # BLD AUTO: 214 10E3/UL (ref 150–450)
POTASSIUM BLD-SCNC: 4.6 MMOL/L (ref 3.4–5.3)
PROT SERPL-MCNC: 6.9 G/DL (ref 6.8–8.8)
PSA SERPL-MCNC: 3.87 UG/L (ref 0–4)
RBC # BLD AUTO: 4.73 10E6/UL (ref 4.4–5.9)
SODIUM SERPL-SCNC: 141 MMOL/L (ref 133–144)
TRIGL SERPL-MCNC: 153 MG/DL
WBC # BLD AUTO: 6.9 10E3/UL (ref 4–11)

## 2022-01-19 PROCEDURE — 80061 LIPID PANEL: CPT | Performed by: INTERNAL MEDICINE

## 2022-01-19 PROCEDURE — 85027 COMPLETE CBC AUTOMATED: CPT | Performed by: INTERNAL MEDICINE

## 2022-01-19 PROCEDURE — G0103 PSA SCREENING: HCPCS | Performed by: INTERNAL MEDICINE

## 2022-01-19 PROCEDURE — 80053 COMPREHEN METABOLIC PANEL: CPT | Performed by: INTERNAL MEDICINE

## 2022-01-19 PROCEDURE — 36415 COLL VENOUS BLD VENIPUNCTURE: CPT | Performed by: INTERNAL MEDICINE

## 2022-01-19 PROCEDURE — 99396 PREV VISIT EST AGE 40-64: CPT | Performed by: INTERNAL MEDICINE

## 2022-01-19 ASSESSMENT — ENCOUNTER SYMPTOMS
COUGH: 0
CHILLS: 0
PARESTHESIAS: 0
HEADACHES: 0
HEARTBURN: 0
ARTHRALGIAS: 0
NAUSEA: 0
HEMATURIA: 0
JOINT SWELLING: 0
CONSTIPATION: 0
SORE THROAT: 0
ABDOMINAL PAIN: 0
EYE PAIN: 0
SHORTNESS OF BREATH: 0
HEMATOCHEZIA: 0
FREQUENCY: 0
DIARRHEA: 0
FEVER: 0
WEAKNESS: 0
MYALGIAS: 0
DIZZINESS: 0
PALPITATIONS: 0

## 2022-01-19 ASSESSMENT — MIFFLIN-ST. JEOR: SCORE: 1863.38

## 2022-01-19 NOTE — RESULT ENCOUNTER NOTE
He does not use MyChart  Needs a PSA in 4-6 weeks per my lab letter  When you call him you can focus on the PSA result and tell him the other results will come in the mail  Can you help him set up a PSA appointment in 4-6 weeks?  (lab ordered)

## 2022-01-19 NOTE — PROGRESS NOTES
SUBJECTIVE:   CC: Darrell Regalado is an 61 year old male who presents for preventative health visit.       Patient has been advised of split billing requirements and indicates understanding: Yes  Healthy Habits:     Getting at least 3 servings of Calcium per day:  NO    Bi-annual eye exam:  Yes    Dental care twice a year:  Yes    Sleep apnea or symptoms of sleep apnea:  Sleep apnea    Diet:  Regular (no restrictions)    Frequency of exercise:  None    Taking medications regularly:  Yes    Medication side effects:  None    PHQ-2 Total Score: 0    Additional concerns today:  No      Today's PHQ-2 Score:   PHQ-2 ( 1999 Pfizer) 1/19/2022   Q1: Little interest or pleasure in doing things 0   Q2: Feeling down, depressed or hopeless 0   PHQ-2 Score 0   PHQ-2 Total Score (12-17 Years)- Positive if 3 or more points; Administer PHQ-A if positive -   Q1: Little interest or pleasure in doing things Not at all   Q2: Feeling down, depressed or hopeless Not at all   PHQ-2 Score 0       Abuse: Current or Past(Physical, Sexual or Emotional)- No  Do you feel safe in your environment? Yes        Social History     Tobacco Use     Smoking status: Never Smoker     Smokeless tobacco: Never Used   Substance Use Topics     Alcohol use: No     Comment: sober since 2006     If you drink alcohol do you typically have >3 drinks per day or >7 drinks per week? Not applicable    Alcohol Use 1/19/2022   Prescreen: >3 drinks/day or >7 drinks/week? Not Applicable   Prescreen: >3 drinks/day or >7 drinks/week? -       Last PSA:   PSA   Date Value Ref Range Status   12/23/2020 0.96 0 - 4 ug/L Final     Comment:     Assay Method:  Chemiluminescence using Siemens Vista analyzer       Reviewed orders with patient. Reviewed health maintenance and updated orders accordingly - Yes  Patient Active Problem List   Diagnosis     Lumbago     RASHID (obstructive sleep apnea)     Hyperlipidemia LDL goal <100     Male erectile disorder     Recurrent cold sores      Past Surgical History:   Procedure Laterality Date     ORTHOPEDIC SURGERY      finger fracture repair      VASECTOMY       wisdom teeth extraction         Social History     Tobacco Use     Smoking status: Never Smoker     Smokeless tobacco: Never Used   Substance Use Topics     Alcohol use: No     Comment: sober since 2006     Family History   Problem Relation Age of Onset     Hypertension Mother      Breast Cancer Mother      Diabetes Father      Cancer - colorectal No family hx of      Prostate Cancer No family hx of          Current Outpatient Medications   Medication Sig Dispense Refill     rosuvastatin (CRESTOR) 20 MG tablet Take 1 tablet (20 mg) by mouth daily 90 tablet 3     sildenafil (VIAGRA) 50 MG tablet TAKE 1/2 TAB (25MG) BY MOUTH DAILY AS NEEDED  (ERECTILE DYSFUNCTION) 30 MINUTES TO 4 HOURS BEFORE SEX. DO NOT USE WITH NITROGLYCERIN, TERAZOSIN OR DOXAZOSIN 30 tablet 0     valACYclovir (VALTREX) 1000 mg tablet Take 2 tablets (2,000 mg) by mouth 2 times daily 4 tablet 3     Allergies   Allergen Reactions     Atorvastatin Muscle Pain (Myalgia)       Reviewed and updated as needed this visit by clinical staff  Tobacco  Allergies  Meds             Reviewed and updated as needed this visit by Provider               Past Medical History:   Diagnosis Date     Alcohol abuse, unspecified     stopped drinking 2006     Other unspecified back disorder     low back pain     Sleep apnea       Past Surgical History:   Procedure Laterality Date     ORTHOPEDIC SURGERY      finger fracture repair      VASECTOMY       wisdom teeth extraction         Review of Systems   Constitutional: Negative for chills and fever.   HENT: Negative for congestion, ear pain, hearing loss and sore throat.    Eyes: Negative for pain and visual disturbance.   Respiratory: Negative for cough and shortness of breath.    Cardiovascular: Negative for chest pain, palpitations and peripheral edema.   Gastrointestinal: Negative for abdominal pain,  "constipation, diarrhea, heartburn, hematochezia and nausea.   Genitourinary: Positive for impotence. Negative for frequency, genital sores, hematuria, penile discharge and urgency.   Musculoskeletal: Negative for arthralgias, joint swelling and myalgias.   Skin: Negative for rash.   Neurological: Negative for dizziness, weakness, headaches and paresthesias.   Psychiatric/Behavioral: Negative for mood changes.         OBJECTIVE:   /82 (BP Location: Left arm, Cuff Size: Adult Large)   Pulse 70   Temp 98.4  F (36.9  C) (Temporal)   Resp 12   Ht 1.822 m (5' 11.73\")   Wt 103 kg (227 lb)   SpO2 100%   BMI 31.02 kg/m      Physical Exam  GENERAL: healthy, alert and no distress  EYES: Eyes grossly normal to inspection, PERRL and conjunctivae and sclerae normal  HENT: ear canals and TM's normal, nose and mouth without ulcers or lesions  NECK: no adenopathy, no asymmetry, masses, or scars and thyroid normal to palpation  RESP: lungs clear to auscultation - no rales, rhonchi or wheezes  CV: regular rate and rhythm, normal S1 S2, no S3 or S4, no murmur, click or rub, no peripheral edema and peripheral pulses strong  ABDOMEN: soft, nontender, no hepatosplenomegaly, no masses and bowel sounds normal  RECTAL: normal sphincter tone, no rectal masses, prostate normal size, smooth, nontender without nodules or masses  MS: no gross musculoskeletal defects noted, no edema  SKIN: no suspicious lesions or rashes  NEURO: Normal strength and tone, mentation intact and speech normal  PSYCH: mentation appears normal, affect normal/bright    Labs pending     ASSESSMENT/PLAN:   (Z00.00) Routine general medical examination at a health care facility  (primary encounter diagnosis)    (E78.5) Hyperlipidemia LDL goal <100  Comment: on statin therapy; recheck labs   Plan: Lipid panel reflex to direct LDL Fasting,         Comprehensive metabolic panel, CBC with         platelets          (G47.33) RASHID (obstructive sleep apnea)  Comment: he " "is not tolerating CPAP, I recommend he return to sleep clinic to trouble shoot this  Plan: SLEEP EVALUATION & MANAGEMENT REFERRAL - ADULT     (N52.9) Male erectile disorder  Comment: he is using rare viagra     (B00.1) Recurrent cold sores    (M54.50) Low back pain without sciatica, unspecified back pain laterality, unspecified chronicity  Comment: discussed increasing exercise to address this, he declined referral to PT, exercises focusing on CORE strength will help the most     (Z12.5) Prostate cancer screening  Plan: PROSTATE SPEC ANTIGEN SCREEN          Patient has been advised of split billing requirements and indicates understanding: Yes    COUNSELING:   Reviewed preventive health counseling, as reflected in patient instructions  Special attention given to:        Regular exercise       Healthy diet/nutrition       Immunizations      Vaccines are up to date            Consider Hep C screening for all patients one time for ages 18-79 years       HIV screeninx in teen years, 1x in adult years, and at intervals if high risk       Colon cancer screening; he had a very large 'high grade' adenomatous polyps removed in 2020; 3 year follow up was recommended ; I reached out to Dr. Boland to see if a sooner colonoscopy is indicated       Prostate cancer screening; PSA        Consider lung cancer screening for ages 55-80 years and 30 pack-year smoking history  ; never smoker     Estimated body mass index is 31.02 kg/m  as calculated from the following:    Height as of this encounter: 1.822 m (5' 11.73\").    Weight as of this encounter: 103 kg (227 lb).     Weight management plan: Discussed healthy diet and exercise guidelines    He reports that he has never smoked. He has never used smokeless tobacco.      Counseling Resources:  ATP IV Guidelines  Pooled Cohorts Equation Calculator  FRAX Risk Assessment  ICSI Preventive Guidelines  Dietary Guidelines for Americans, 2010  USDA's MyPlate  ASA Prophylaxis  Lung CA " Screening    Mike Mathur MD  [facility]

## 2022-01-19 NOTE — LETTER
"January 19, 2022      Darrell Regalado  [address] [facility] [address]        Dear ,    We are writing to inform you of your test results.    The following letter pertains to your most recent diagnostic tests:     -While the PSA is still in the normal range, it did jump up a bit since last check.  Usually, in this scenario, I recommend a recheck in 4-6 weeks.  You can schedule a lab appointment for that purpose.       -Your total cholesterol is 220 which is above your goal of total cholesterol less than 200.     -Your triglycerides are 153 which are just above your goal of triglycerides less than 150.     -Your HDL or \"good cholesterol\" is 37 which is below your goal of HDL cholesterol greater than 40.     -Your LDL cholesterol or \"bad cholesterol\" is 152 which is just above your goal of LDL cholesterol less than <130.  Your LDL goal is based on your risk factors for artery disease.     -Liver and gallbladder tests are normal for you. (ALT,AST, Alk phos, bilirubin), kidney function is normal for you (Creatinine, GFR), Sodium is normal, Potassium is normal for you, Calcium is normal for you, Glucose (blood sugar) is moderately elevated, but NOT in the diabetic range.     -Your complete blood counts including your hemoglobin returned normal for you.           Bottom line:  The cholesterol and blood sugar have crept up a bit since last year.  Exercise and calorie reduction would improve both parameters.       Based on your cholesterol levels and other risk factors, your calculated statistical risk for having a heart attack over the next 10 years is elevated.  I estimate a 11.9% chance of heart attack over the next 10 years.  Because we believe that statin medications such as atorvastatin (Lipitor) have protective effects in the blood vessels that extend beyond their ability to lower cholesterol, if you took atorvastatin (Lipitor) regularly, you could bring that 10-year statistical risk for heart " attack down significantly.  Generally, we recommend statin cholesterol-lowering medications such as atorvastatin (Lipitor) to individuals with 10 year statistical risk for heart attack greater than 7.5%.  As such, you are certainly a candidate for starting that drug for prevention purposes.       You should be informed that a very small minority of people who take atorvastatin can develop muscle pain and weakness as a side effect from that drug.  If you experience those side effects, then stop the drug and contact me.  If you start the medication, you should have a follow up fasting cholesterol panel after you have been taking the medication for 2 months.  You can schedule a lab appointment for that purpose.       Please contact me if you would like to get that medication started so we can send an prescritpion and arrange for appropriate follow up.       You should definitely return in 4-6 weeks to recheck the PSA level.  You can schedule a lab appointment for that purpose.                   Follow up:  Lab appointment 4-6 weeks to recheck PSA.  Inform me if you want to get the atorvastatin (Lipitor) started.      Resulted Orders   Lipid panel reflex to direct LDL Fasting   Result Value Ref Range    Cholesterol 220 (H) <200 mg/dL    Triglycerides 153 (H) <150 mg/dL    Direct Measure HDL 37 (L) >=40 mg/dL    LDL Cholesterol Calculated 152 (H) <=100 mg/dL    Non HDL Cholesterol 183 (H) <130 mg/dL    Patient Fasting > 8hrs? Yes     Narrative    Cholesterol  Desirable:  <200 mg/dL    Triglycerides  Normal:  Less than 150 mg/dL  Borderline High:  150-199 mg/dL  High:  200-499 mg/dL  Very High:  Greater than or equal to 500 mg/dL    Direct Measure HDL  Female:  Greater than or equal to 50 mg/dL   Male:  Greater than or equal to 40 mg/dL    LDL Cholesterol  Desirable:  <100mg/dL  Above Desirable:  100-129 mg/dL   Borderline High:  130-159 mg/dL   High:  160-189 mg/dL   Very High:  >= 190 mg/dL    Non HDL  Cholesterol  Desirable:  130 mg/dL  Above Desirable:  130-159 mg/dL  Borderline High:  160-189 mg/dL  High:  190-219 mg/dL  Very High:  Greater than or equal to 220 mg/dL   Comprehensive metabolic panel   Result Value Ref Range    Sodium 141 133 - 144 mmol/L    Potassium 4.6 3.4 - 5.3 mmol/L    Chloride 110 (H) 94 - 109 mmol/L    Carbon Dioxide (CO2) 29 20 - 32 mmol/L    Anion Gap 2 (L) 3 - 14 mmol/L    Urea Nitrogen 20 7 - 30 mg/dL    Creatinine 0.96 0.66 - 1.25 mg/dL    Calcium 8.9 8.5 - 10.1 mg/dL    Glucose 122 (H) 70 - 99 mg/dL    Alkaline Phosphatase 78 40 - 150 U/L    AST 13 0 - 45 U/L    ALT 38 0 - 70 U/L    Protein Total 6.9 6.8 - 8.8 g/dL    Albumin 3.8 3.4 - 5.0 g/dL    Bilirubin Total 0.6 0.2 - 1.3 mg/dL    GFR Estimate 89 >60 mL/min/1.73m2      Comment:      Effective December 21, 2021 eGFRcr in adults is calculated using the 2021 CKD-EPI creatinine equation which includes age and gender (Jana et al., NEJ, DOI: 10.1056/UEAAsl3112127)   CBC with platelets   Result Value Ref Range    WBC Count 6.9 4.0 - 11.0 10e3/uL    RBC Count 4.73 4.40 - 5.90 10e6/uL    Hemoglobin 15.0 13.3 - 17.7 g/dL    Hematocrit 45.2 40.0 - 53.0 %    MCV 96 78 - 100 fL    MCH 31.7 26.5 - 33.0 pg    MCHC 33.2 31.5 - 36.5 g/dL    RDW 13.2 10.0 - 15.0 %    Platelet Count 214 150 - 450 10e3/uL   PROSTATE SPEC ANTIGEN SCREEN   Result Value Ref Range    Prostate Specific Antigen Screen 3.87 0.00 - 4.00 ug/L   If you have any questions or concerns, please call the clinic at the number listed above.       Sincerely,    Mike Mathur MD

## 2022-01-19 NOTE — TELEPHONE ENCOUNTER
Mike Mathur MD  P [facility] Triage  Can you call Tank and tell him that I spoke to his colonoscopy doctor and she recommends a colonoscopy as soon as possible due to the size of the polyp noted in 2020.  Her  should call him to find a time for an appointment      Called patient and he will call the GI if he has not heard back from the scheduling department.     Bethany Perkins RN  [facility]

## 2022-01-19 NOTE — PROGRESS NOTES
Discussed colonoscopy interval with Dr. Boland  She recommends colonoscopy as soon as possible   I asked my staff to reach out to him to explain this  I sent a referral to the porfirio

## 2022-01-19 NOTE — RESULT ENCOUNTER NOTE
"The following letter pertains to your most recent diagnostic tests:    -While the PSA is still in the normal range, it did jump up a bit since last check.  Usually, in this scenario, I recommend a recheck in 4-6 weeks.  You can schedule a lab appointment for that purpose.      -Your total cholesterol is 220 which is above your goal of total cholesterol less than 200.    -Your triglycerides are 153 which are just above your goal of triglycerides less than 150.    -Your HDL or \"good cholesterol\" is 37 which is below your goal of HDL cholesterol greater than 40.    -Your LDL cholesterol or \"bad cholesterol\" is 152 which is just above your goal of LDL cholesterol less than <130.  Your LDL goal is based on your risk factors for artery disease.    -Liver and gallbladder tests are normal for you. (ALT,AST, Alk phos, bilirubin), kidney function is normal for you (Creatinine, GFR), Sodium is normal, Potassium is normal for you, Calcium is normal for you, Glucose (blood sugar) is moderately elevated, but NOT in the diabetic range.     -Your complete blood counts including your hemoglobin returned normal for you.         Bottom line:  The cholesterol and blood sugar have crept up a bit since last year.  Exercise and calorie reduction would improve both parameters.      Based on your cholesterol levels and other risk factors, your calculated statistical risk for having a heart attack over the next 10 years is elevated.  I estimate a 11.9% chance of heart attack over the next 10 years.  Because we believe that statin medications such as atorvastatin (Lipitor) have protective effects in the blood vessels that extend beyond their ability to lower cholesterol, if you took atorvastatin (Lipitor) regularly, you could bring that 10-year statistical risk for heart attack down significantly.  Generally, we recommend statin cholesterol-lowering medications such as atorvastatin (Lipitor) to individuals with 10 year statistical risk for " heart attack greater than 7.5%.  As such, you are certainly a candidate for starting that drug for prevention purposes.      You should be informed that a very small minority of people who take atorvastatin can develop muscle pain and weakness as a side effect from that drug.  If you experience those side effects, then stop the drug and contact me.  If you start the medication, you should have a follow up fasting cholesterol panel after you have been taking the medication for 2 months.  You can schedule a lab appointment for that purpose.      Please contact me if you would like to get that medication started so we can send an prescritpion and arrange for appropriate follow up.       You should definitely return in 4-6 weeks to recheck the PSA level.  You can schedule a lab appointment for that purpose.              Follow up:  Lab appointment 4-6 weeks to recheck PSA.  Inform me if you want to get the atorvastatin (Lipitor) started.        Sincerely,    Dr. Mathur    The 10-year ASCVD risk score (Evi DAVIDSON Jr., et al., 2013) is: 11.9%    Values used to calculate the score:      Age: 61 years      Sex: Male      Is Non- : No      Diabetic: No      Tobacco smoker: No      Systolic Blood Pressure: 120 mmHg      Is BP treated: No      HDL Cholesterol: 37 mg/dL      Total Cholesterol: 220 mg/dL

## 2022-01-19 NOTE — Clinical Note
Can you call Tank and tell him that I spoke to his colonoscopy doctor and she recommends a colonoscopy as soon as possible due to the size of the polyp noted in 2020.  Her  should call him to find a time for an appointment.

## 2022-01-24 ENCOUNTER — TELEPHONE (OUTPATIENT)
Dept: GASTROENTEROLOGY | Facility: CLINIC | Age: 62
End: 2022-01-24
Payer: COMMERCIAL

## 2022-01-24 DIAGNOSIS — Z11.59 ENCOUNTER FOR SCREENING FOR OTHER VIRAL DISEASES: Primary | ICD-10-CM

## 2022-01-24 NOTE — TELEPHONE ENCOUNTER
Screening Questions  Blue=prep questions Red=location Green=sedation   1. Are you active on mychart? N, Sent link    2. What insurance is in the chart? BCBS     3.  Ordering/Referring Provider: Mike Mathur MD in  FAMILY PRAC/IM    4. BMI 29.2, If greater than 40 review exclusion criteria also will need EXTENDED PREP    5.  Respiratory Screening (If yes to any of the following HOSPITAL setting only):     Do you use daily home oxygen? N  Do you have mod to severe Obstructive Sleep Apnea? Y (can be seen at [facility] or hospital setting)    Do you have Pulmonary Hypertension? N   Do you have UNCONTROLLED asthma? N    6. Have you had a heart or lung transplant? N  (If yes, please review exclusion criteria)    7. Are you currently on dialysis?N  (If yes, schedule in HOSPITAL setting only)(If yes, please send Golytely prep)    8. Do you have chronic kidney disease? N (If yes, please send Golytely prep)    9. Have you had a stroke or Transient ischemic attack (TIA) within 6 months? N (If yes, do not schedule at [facility])    10. In the past 6 months, have you had any heart related issues including cardiomyopathy or heart attack? N (If yes, please review exclusion criteria)           If yes, did it require cardiac stenting or other implantable device?  (If yes, please review exclusion criteria)      11. Do you have any implantable devices in your body (pacemaker, defib, LVAD)? N (If yes, schedule at [facility])    12. Do you take nitroglycerin? If yes, how often? N (if yes, schedule at HOSPITAL setting)    13. Are you currently taking any blood thinners?N (If yes- inform patient to follow up with PCP or provider for follow up instructions)     14. Are you a diabetic? N (If yes, please send Golytely prep)    15. (Females) Are you currently pregnant?   If yes, how many weeks?      16. Are you taking any prescription pain medications on a routine schedule? N If yes, MAC sedation and patient will need EXTENDED PREP.    17. Do you have any  chemical dependencies such as alcohol, street drugs, or methadone? N If yes, MAC sedation     18. Do you have any history of post-traumatic stress syndrome, severe anxiety or history of psychosis? N  If yes, MAC sedation.     19. Do you transfer independently? Y    20.  Do you have any issues with constipation? N   If yes, pt will need EXTENDED PREP     21. Preferred Pharmacy for Pre Prescription [id number] [place], [place] - [address] AT [facility] [place] [facility] [address]    Scheduling Details    Which Colonoscopy Prep was Sent?: Miralax  Type of Procedure Scheduled: Colonoscopy  Surgeon: Kya  Date of Procedure: 2/2/2022  Location:   Caller (Please ask for phone number if not scheduled by patient): Mya (spouse)      Sedation Type: CS  Conscious Sedation- Needs  for 6 hours after the procedure  MAC/General-Needs  for 24 hours after procedure    Pre-op Required at [facility], [place], [place] and OR for MAC sedation: n  (if yes advise patient they will need a pre-op prior to procedure)      Informed patient they will need an adult  Y  Cannot take any type of public or medical transportation alone    Pre-Procedure Covid test to be completed at [facility] or Externally: [place] on 1/30    Confirmed Nurse will call to complete assessment Y    Additional comments: none

## 2022-01-28 ENCOUNTER — MYC MEDICAL ADVICE (OUTPATIENT)
Dept: FAMILY MEDICINE | Facility: CLINIC | Age: 62
End: 2022-01-28
Payer: COMMERCIAL

## 2022-01-28 DIAGNOSIS — E78.5 HYPERLIPIDEMIA LDL GOAL <100: Primary | ICD-10-CM

## 2022-01-30 ENCOUNTER — LAB (OUTPATIENT)
Dept: URGENT CARE | Facility: URGENT CARE | Age: 62
End: 2022-01-30
Payer: COMMERCIAL

## 2022-01-30 DIAGNOSIS — Z11.59 ENCOUNTER FOR SCREENING FOR OTHER VIRAL DISEASES: ICD-10-CM

## 2022-01-30 PROCEDURE — U0005 INFEC AGEN DETEC AMPLI PROBE: HCPCS

## 2022-01-30 PROCEDURE — U0003 INFECTIOUS AGENT DETECTION BY NUCLEIC ACID (DNA OR RNA); SEVERE ACUTE RESPIRATORY SYNDROME CORONAVIRUS 2 (SARS-COV-2) (CORONAVIRUS DISEASE [COVID-19]), AMPLIFIED PROBE TECHNIQUE, MAKING USE OF HIGH THROUGHPUT TECHNOLOGIES AS DESCRIBED BY CMS-2020-01-R: HCPCS

## 2022-01-31 LAB — SARS-COV-2 RNA RESP QL NAA+PROBE: NEGATIVE

## 2022-01-31 NOTE — TELEPHONE ENCOUNTER
Please review patient's MyChart message regarding switching from Rosuvastatin to Atorvastatin.    Stevenson Torres, SALVATORE on 1/31/2022 at 9:12 AM

## 2022-02-01 DIAGNOSIS — B00.1 RECURRENT COLD SORES: ICD-10-CM

## 2022-02-01 RX ORDER — VALACYCLOVIR HYDROCHLORIDE 1 G/1
2000 TABLET, FILM COATED ORAL 2 TIMES DAILY
Qty: 4 TABLET | Refills: 5 | Status: SHIPPED | OUTPATIENT
Start: 2022-02-01 | End: 2023-06-28

## 2022-02-02 ENCOUNTER — HOSPITAL ENCOUNTER (OUTPATIENT)
Facility: CLINIC | Age: 62
Discharge: HOME | End: 2022-02-02
Attending: COLON & RECTAL SURGERY | Admitting: COLON & RECTAL SURGERY
Payer: COMMERCIAL

## 2022-02-02 VITALS
BODY MASS INDEX: 31.48 KG/M2 | HEART RATE: 51 BPM | RESPIRATION RATE: 16 BRPM | OXYGEN SATURATION: 100 % | DIASTOLIC BLOOD PRESSURE: 72 MMHG | HEIGHT: 71 IN | SYSTOLIC BLOOD PRESSURE: 110 MMHG | WEIGHT: 224.87 LBS

## 2022-02-02 LAB — COLONOSCOPY: NORMAL

## 2022-02-02 PROCEDURE — G0105 COLORECTAL SCRN; HI RISK IND: HCPCS | Performed by: COLON & RECTAL SURGERY

## 2022-02-02 PROCEDURE — 45378 DIAGNOSTIC COLONOSCOPY: CPT | Performed by: COLON & RECTAL SURGERY

## 2022-02-02 PROCEDURE — 250N000011 HC RX IP 250 OP 636: Performed by: COLON & RECTAL SURGERY

## 2022-02-02 PROCEDURE — G0500 MOD SEDAT ENDO SERVICE >5YRS: HCPCS | Performed by: COLON & RECTAL SURGERY

## 2022-02-02 RX ORDER — FENTANYL CITRATE 50 UG/ML
INJECTION, SOLUTION INTRAMUSCULAR; INTRAVENOUS PRN
Status: COMPLETED | OUTPATIENT
Start: 2022-02-02 | End: 2022-02-02

## 2022-02-02 RX ORDER — ONDANSETRON 2 MG/ML
4 INJECTION INTRAMUSCULAR; INTRAVENOUS
Status: DISCONTINUED | OUTPATIENT
Start: 2022-02-02 | End: 2022-02-02 | Stop reason: HOSPADM

## 2022-02-02 RX ORDER — LIDOCAINE 40 MG/G
CREAM TOPICAL
Status: DISCONTINUED | OUTPATIENT
Start: 2022-02-02 | End: 2022-02-02 | Stop reason: HOSPADM

## 2022-02-02 RX ADMIN — FENTANYL CITRATE 100 MCG: 50 INJECTION, SOLUTION INTRAMUSCULAR; INTRAVENOUS at 08:39

## 2022-02-02 RX ADMIN — MIDAZOLAM 2 MG: 1 INJECTION INTRAMUSCULAR; INTRAVENOUS at 08:40

## 2022-02-02 ASSESSMENT — MIFFLIN-ST. JEOR: SCORE: 1842.13

## 2022-02-02 NOTE — H&P
Colon & Rectal Surgery History and Physical  Pre-Endoscopy Procedure Note    History of Present Illness   I have been asked by Dr. Mathur to evaluate this 61 year old male for colorectal polyp surveillance. He had a tubulovillous adenoma with microscopic focus of high grade dysplasia removed during colonoscopy in 2020. He currently denies any abdominal pain, weight loss, bleeding per rectum, or recent change in bowel habits.    Past Medical History  Diagnosis Date     Alcohol abuse, unspecified     stopped drinking 2006     Other unspecified back disorder     low back pain     Sleep apnea        Past Surgical History  Procedure Laterality Date     ORTHOPEDIC SURGERY      finger fracture repair      VASECTOMY       [place] teeth extraction          Medications  Medication Sig     rosuvastatin (CRESTOR) 20 MG tablet Take 1 tablet (20 mg) by mouth daily     sildenafil (VIAGRA) 50 MG tablet TAKE 1/2 TAB (25MG) BY MOUTH DAILY AS NEEDED  (ERECTILE DYSFUNCTION) 30 MINUTES TO 4 HOURS BEFORE SEX. DO NOT USE WITH NITROGLYCERIN, TERAZOSIN OR DOXAZOSIN     valACYclovir (VALTREX) 1000 mg tablet Take 2 tablets (2,000 mg) by mouth 2 times daily       Allergies  Allergen Reactions     Atorvastatin Muscle Pain (Myalgia)        Family History   Family history includes Breast Cancer in his mother; Diabetes in his father; Hypertension in his mother; Prostate Cancer in his father.     Social History   He reports that he has never smoked. He has never used smokeless tobacco. He reports that he does not drink alcohol and does not use drugs.    Review of Systems   Constitutional:  No fever, weight change or fatigue.    Eyes:     No dry eyes or vision changes.   Ears/Nose/Throat/Neck:  No oral ulcers, sore throat or voice change.    Cardiovascular:   No palpitations, syncope, angina or edema.   Respiratory:    No chest pain, excessive sleepiness, shortness of breath or hemoptysis.    Gastrointestinal:   No abdominal pain, nausea, vomiting,  "diarrhea or heartburn.    Genitourinary:   No dysuria, hematuria, urinary retention or urinary frequency.   Musculoskeletal:  No joint swelling or arthralgias.    Dermatologic:  No skin rash or other skin changes.   Neurologic:    No focal weakness or numbness. No neuropathy.   Psychiatric:    No depression, anxiety, suicidal ideation, or paranoid ideation.   Endocrine:   No cold or heat intolerance, polydipsia, hirsutism, change in libido, or flushing.   Hematology/Lymphatic:  No bleeding or lymphadenopathy.    Allergy/Immunology:  No rhinitis or hives.     Physical Exam   Vitals:  /85, HR 60, RR 17, height 1.803 m (5' 11\"), weight 102 kg (224 lb 13.9 oz), SpO2 99 %.    General:  Alert and oriented to person, place and time   Airway: Normal oropharyngeal airway and neck mobility   Lungs:  Clear bilaterally   Heart:  Regular rate and rhythm   Abdomen: Soft, NT, ND, no masses   Extremities: Warm, good capillary refill    ASA Grade: II (mild systemic disease)    Impression: Cleared for use of conscious sedation for colorectal polyp surveillance    Plan: Proceed with colonoscopy     Cristela Boland MD  [facility]  [phone]  "

## 2022-02-14 ENCOUNTER — TELEPHONE (OUTPATIENT)
Dept: FAMILY MEDICINE | Facility: CLINIC | Age: 62
End: 2022-02-14
Payer: COMMERCIAL

## 2022-02-14 DIAGNOSIS — E78.5 HYPERLIPIDEMIA LDL GOAL <100: ICD-10-CM

## 2022-02-14 RX ORDER — ROSUVASTATIN CALCIUM 20 MG/1
20 TABLET, COATED ORAL DAILY
Qty: 90 TABLET | Refills: 3 | Status: SHIPPED | OUTPATIENT
Start: 2022-02-14 | End: 2023-03-13

## 2022-02-14 RX ORDER — ROSUVASTATIN CALCIUM 20 MG/1
20 TABLET, COATED ORAL DAILY
Qty: 90 TABLET | Refills: 3 | Status: CANCELLED | OUTPATIENT
Start: 2022-02-14

## 2022-02-14 NOTE — TELEPHONE ENCOUNTER
Dr Mathur    Pharmacy faxing - requesting refill of:  Rosuvastatin 20mg    LOV 1- for routine physical; per lab results, lipid panel increased.  You wanted patient to start on Atorvastatin; however that is listed in allergies.    Did you want to dose dose change on rosuvastatin?     No medication pended, please review.    Please task back to triage as needed.    RT Courtney (R)

## 2022-02-16 ENCOUNTER — LAB (OUTPATIENT)
Dept: LAB | Facility: CLINIC | Age: 62
End: 2022-02-16
Payer: COMMERCIAL

## 2022-02-16 DIAGNOSIS — R97.20 ELEVATED PROSTATE SPECIFIC ANTIGEN (PSA): ICD-10-CM

## 2022-02-16 DIAGNOSIS — E78.5 HYPERLIPIDEMIA LDL GOAL <100: ICD-10-CM

## 2022-02-16 LAB
CHOLEST SERPL-MCNC: 146 MG/DL
FASTING STATUS PATIENT QL REPORTED: YES
HDLC SERPL-MCNC: 50 MG/DL
LDLC SERPL CALC-MCNC: 80 MG/DL
NONHDLC SERPL-MCNC: 96 MG/DL
PSA SERPL-MCNC: 1.35 UG/L (ref 0–4)
TRIGL SERPL-MCNC: 78 MG/DL

## 2022-02-16 PROCEDURE — 80061 LIPID PANEL: CPT

## 2022-02-16 PROCEDURE — 36415 COLL VENOUS BLD VENIPUNCTURE: CPT

## 2022-02-16 PROCEDURE — 84153 ASSAY OF PSA TOTAL: CPT

## 2022-02-16 NOTE — RESULT ENCOUNTER NOTE
The following letter pertains to your most recent diagnostic tests:      Good news! These numbers look a lot better!    Keep taking your Crestor (rosuvastatin) as you are.  The previous elevation in the PSA was only transient.  We can recheck with your next regular check up.        Sincerely,    Dr. Mathur

## 2022-02-16 NOTE — LETTER
February 16, 2022      Tank ESPANA Sabine  [address] [id number]        Dear ,    We are writing to inform you of your test results.    Good news! These numbers look a lot better!     Keep taking your Crestor (rosuvastatin) as you are.  The previous elevation in the PSA was only transient.  We can recheck with your next regular check up.         Resulted Orders   Lipid panel reflex to direct LDL Fasting   Result Value Ref Range    Cholesterol 146 <200 mg/dL    Triglycerides 78 <150 mg/dL    Direct Measure HDL 50 >=40 mg/dL    LDL Cholesterol Calculated 80 <=100 mg/dL    Non HDL Cholesterol 96 <130 mg/dL    Patient Fasting > 8hrs? Yes     Narrative    Cholesterol  Desirable:  <200 mg/dL    Triglycerides  Normal:  Less than 150 mg/dL  Borderline High:  150-199 mg/dL  High:  200-499 mg/dL  Very High:  Greater than or equal to 500 mg/dL    Direct Measure HDL  Female:  Greater than or equal to 50 mg/dL   Male:  Greater than or equal to 40 mg/dL    LDL Cholesterol  Desirable:  <100mg/dL  Above Desirable:  100-129 mg/dL   Borderline High:  130-159 mg/dL   High:  160-189 mg/dL   Very High:  >= 190 mg/dL    Non HDL Cholesterol  Desirable:  130 mg/dL  Above Desirable:  130-159 mg/dL  Borderline High:  160-189 mg/dL  High:  190-219 mg/dL  Very High:  Greater than or equal to 220 mg/dL   PSA tumor marker   Result Value Ref Range    PSA Tumor Marker 1.35 0.00 - 4.00 ug/L       If you have any questions or concerns, please call the clinic at the number listed above.       Sincerely,      Mike Mathur MD

## 2022-07-13 DIAGNOSIS — N52.9 MALE ERECTILE DISORDER: ICD-10-CM

## 2022-07-14 DIAGNOSIS — N52.9 MALE ERECTILE DISORDER: ICD-10-CM

## 2022-07-14 RX ORDER — SILDENAFIL 50 MG/1
TABLET, FILM COATED ORAL
Qty: 30 TABLET | Refills: 0 | Status: SHIPPED | OUTPATIENT
Start: 2022-07-14 | End: 2022-07-17

## 2022-07-14 NOTE — TELEPHONE ENCOUNTER
Routing refill request to provider for review/approval because:  Prescription approved per [facility] Refill Protocol.  LOV: 1/19/22- noted that patient is using medication  NOV: none- advised to return in about 1 year (around 1/19/2023) for Preventive Visit.        Flores Khanna RN  [facility]

## 2022-07-17 RX ORDER — SILDENAFIL 50 MG/1
TABLET, FILM COATED ORAL
Qty: 30 TABLET | Refills: 0 | OUTPATIENT
Start: 2022-07-17

## 2022-07-17 NOTE — TELEPHONE ENCOUNTER
This is a duplicate refill request, that has been refused to the pharmacy.   Og Lewis RN  [facility] Triage Nurse

## 2022-08-10 ENCOUNTER — TELEPHONE (OUTPATIENT)
Dept: SLEEP MEDICINE | Facility: CLINIC | Age: 62
End: 2022-08-10

## 2022-08-10 NOTE — TELEPHONE ENCOUNTER
LOV: 05/22/2019    Patient requesting order for new CPAP.     Patient will need to schedule appointment and re-establish care as it has been >3 years since last visit.     Appt scheduled.    Radha Carty RN on 8/10/2022 at 9:15 AM

## 2022-08-10 NOTE — TELEPHONE ENCOUNTER
Reason for Call:  Other prescription    Detailed comments: Wondering if he can get a prescription for a cpap    Phone Number Patient can be reached at: Home number on file [phone] (home)    Best Time: anytime    Can we leave a detailed message on this number? YES    Call taken on 8/10/2022 at 8:59 AM by Zahra Calle     [phone]

## 2022-11-16 ENCOUNTER — OFFICE VISIT (OUTPATIENT)
Dept: SLEEP MEDICINE | Facility: CLINIC | Age: 62
End: 2022-11-16
Payer: COMMERCIAL

## 2022-11-16 VITALS
HEART RATE: 73 BPM | DIASTOLIC BLOOD PRESSURE: 80 MMHG | WEIGHT: 238 LBS | OXYGEN SATURATION: 100 % | SYSTOLIC BLOOD PRESSURE: 128 MMHG | HEIGHT: 72 IN | BODY MASS INDEX: 32.23 KG/M2

## 2022-11-16 DIAGNOSIS — G47.33 OSA (OBSTRUCTIVE SLEEP APNEA): Primary | ICD-10-CM

## 2022-11-16 PROCEDURE — 99203 OFFICE O/P NEW LOW 30 MIN: CPT | Performed by: INTERNAL MEDICINE

## 2022-11-16 NOTE — NURSING NOTE
Chief Complaint   Patient presents with     Sleep Problem     Feel tired in the morning       Initial /80   Pulse 73   Ht 1.829 m (6')   Wt 108 kg (238 lb)   SpO2 100%   BMI 32.28 kg/m   Estimated body mass index is 32.28 kg/m  as calculated from the following:    Height as of this encounter: 1.829 m (6').    Weight as of this encounter: 108 kg (238 lb).    Medication Reconciliation: complete    Neck circumference: 44 centimeters.  Consuelo Skinner MA

## 2022-11-16 NOTE — PROGRESS NOTES
Sleep Consultation:    Date on this visit: 11/16/2022    Darrell Regalado  is referred by No ref. provider found for a sleep consultation.     Primary Physician: Mike Mathur     Darrell Regalado presents to clinic for management of obstructive sleep apnea.     Medical history significant for hyperlipidemia.  He works in car sales.    PSG on 11/28/2017 at weight of 232 lbs showed an apnea hypopnea index of 27.8 per hour.  Sleep apnea events were exclusively supine position dependent in the diagnostic portion.  During titration, CPAP at a pressure of 6 cm H2O, effectively treat her sleep apnea.    Patient was prescribed auto titrating CPAP therapy.  On his last visit in 2019, he was on auto CPAP 8 to 15 cm H2O, and was using CPAP intermittently.    Patient reports that since then, he fell out of the habit of using CPAP.  Due to renewed health concerns regarding effects of untreated sleep apnea, and concerns from his family, he is started using CPAP again.  Since his original device broke, he has been using a borrowed device from his family member.  He has a ResMed air sense 10 device.     The mask is comfortable. The mask is not leaking.  He is not snoring with the mask on. He is not having gasp arousals.  He is not having significant oral/nasal dryness. The pressure settings are comfortable.     He uses full-face mask.     He does not feel rested in the morning.    ResMed     Auto-PAP 10-20 cmH2O download:  14/90 total days of use.  Average use 6 hours 52 minutes per day. Median Leak 19 L/min. 95%ile Leak 86 L/min. CPAP 95% pressure 11.2cm. AHI 1.8    Darrell goes to sleep at 10:00 PM during the week. He wakes up at 6:30 AM. He falls asleep in 15 minutes.  Darrell denies difficulty falling asleep.  He wakes up 1-2 times a night for 15 minutes before falling back to sleep.  Darrell wakes up to go to the bathroom and uncertain reasons.  On weekends, Darrell goes to sleep at 10:00 PM.  He wakes up at 7:00 AM. He falls  asleep in 15 minutes.  Patient gets an average of 7 hours of sleep per night.     He has occasional morning headaches, denies no restless legs. Darrell denies any sleep walking, dream enactment, sleep paralysis and hypnogogic/hypnopompic hallucinations. He has a history of bruxism.     Patient's [place] Sleepiness score 3/24 consistent with no daytime sleepiness.      Darrell naps 0 times per week . He takes no inadvertant naps.  He denies closing eyes, dozing and falling asleep while driving. Patient was counseled on the importance of driving while alert, to pull over if drowsy, or nap before getting into the vehicle if sleepy.      Problem List:  Patient Active Problem List    Diagnosis Date Noted     Recurrent cold sores 11/20/2019     Priority: Medium     Hyperlipidemia LDL goal <100 10/31/2018     Priority: Medium     Male erectile disorder 10/31/2018     Priority: Medium     RASHID (obstructive sleep apnea) 11/30/2017     Priority: Medium     Lumbago 02/25/2009     Priority: Medium        Past Medical/Surgical History:  Past Medical History:   Diagnosis Date     Alcohol abuse, unspecified     stopped drinking 2006     Other unspecified back disorder     low back pain     Sleep apnea      Social History     Tobacco Use     Smoking status: Never     Smokeless tobacco: Never   Vaping Use     Vaping Use: Never used   Substance Use Topics     Alcohol use: No     Comment: sober since 2006     Drug use: No       Family History:  Family History   Problem Relation Age of Onset     Hypertension Mother      Breast Cancer Mother      Diabetes Father      Prostate Cancer Father      Cancer - colorectal No family hx of      Physical Examination:  Vitals: /80   Pulse 73   Ht 1.829 m (6')   Wt 108 kg (238 lb)   SpO2 100%   BMI 32.28 kg/m    BMI= Body mass index is 32.28 kg/m .  GENERAL APPEARANCE: healthy, alert and no distress  HENT: oropharynx crowded  NEURO: mentation intact and speech normal  PSYCH: mentation  appears normal and affect normal/bright  Mallampati Class: IV.  Tonsillar Stage: 1  hidden by pillars.    Impression/Plan:    1.  Obstructive sleep apnea    Patient's PSG in 2017 showed moderate obstructive sleep apnea with an apnea-hypopnea index of 27.8/h, with sleep apnea events exclusively occurring in supine sleep during the diagnostic portion.  He had optimal titration at a CPAP pressure of 6 cm H2O.  Patient was was prescribed auto CPAP therapy but was not regularly adherent with treatment.  His previous auto CPAP settings were 8 to 15 cm H2O.  After a break of using CPAP for the last 2 years, he recently restarted with a borrowed device, which is set to auto CPAP range of 10 to 20 cm H2O.  I reviewed download data from his device, which shows normal residual AHI at these therapy settings.  There is increased mask leak which was reviewed with the patient, and he will optimize mask fitting.  He complains that the pressure settings does not feel right.  However, he is unable to specify whether it is a feeling of too much pressure or insufficient pressure.  After discussing this, we decided to change his therapy settings to his previously effective settings of 8 to 15 cm H2O.  We discussed importance of regular adherence to CPAP therapy for optimal benefit.  We also reviewed alternate therapy options for moderate obstructive sleep apnea.  Patient plans to continue with CPAP therapy for preventative health purposes.    Plan:     1.  Continue auto CPAP therapy with a pressure range of 8 to 15 cm H2O.  2. Routine follow up in a year    Obstructive sleep apnea reviewed.  Complications of untreated sleep apnea were reviewed.    I spent a total of 30 minutes for this appointment on this date of service which include time spent before, during and after the visit for chart review, patient care, counseling and coordination of care.    Dr. Marshall Catalan       CC: No ref. provider found

## 2023-03-11 DIAGNOSIS — E78.5 HYPERLIPIDEMIA LDL GOAL <100: ICD-10-CM

## 2023-03-13 RX ORDER — ROSUVASTATIN CALCIUM 20 MG/1
TABLET, COATED ORAL
Qty: 90 TABLET | Refills: 0 | Status: SHIPPED | OUTPATIENT
Start: 2023-03-13 | End: 2023-06-12

## 2023-03-13 NOTE — TELEPHONE ENCOUNTER
Appointments in Next Year    Jun 14, 2023  3:00 PM  (Arrive by 2:40 PM)  Adult Preventative Visit with Mike Mathur MD  [facility] ([facility] ) [phone]        RT Courtney (R)

## 2023-04-22 ENCOUNTER — HEALTH MAINTENANCE LETTER (OUTPATIENT)
Age: 63
End: 2023-04-22

## 2023-06-11 DIAGNOSIS — E78.5 HYPERLIPIDEMIA LDL GOAL <100: ICD-10-CM

## 2023-06-12 RX ORDER — ROSUVASTATIN CALCIUM 20 MG/1
TABLET, COATED ORAL
Qty: 90 TABLET | Refills: 0 | Status: SHIPPED | OUTPATIENT
Start: 2023-06-12 | End: 2023-06-28

## 2023-06-16 ENCOUNTER — MYC MEDICAL ADVICE (OUTPATIENT)
Dept: FAMILY MEDICINE | Facility: CLINIC | Age: 63
End: 2023-06-16
Payer: COMMERCIAL

## 2023-06-16 NOTE — TELEPHONE ENCOUNTER
Patient Contact    Attempt # 1    Was call answered? No.    Left message for patient to call triage back.    Sophie Mendiola RN

## 2023-06-28 ENCOUNTER — OFFICE VISIT (OUTPATIENT)
Dept: FAMILY MEDICINE | Facility: CLINIC | Age: 63
End: 2023-06-28
Payer: COMMERCIAL

## 2023-06-28 VITALS
HEIGHT: 72 IN | OXYGEN SATURATION: 97 % | TEMPERATURE: 97.7 F | DIASTOLIC BLOOD PRESSURE: 78 MMHG | RESPIRATION RATE: 18 BRPM | WEIGHT: 232.2 LBS | SYSTOLIC BLOOD PRESSURE: 124 MMHG | BODY MASS INDEX: 31.45 KG/M2 | HEART RATE: 60 BPM

## 2023-06-28 DIAGNOSIS — B00.1 RECURRENT COLD SORES: ICD-10-CM

## 2023-06-28 DIAGNOSIS — Z00.00 ROUTINE GENERAL MEDICAL EXAMINATION AT A HEALTH CARE FACILITY: Primary | ICD-10-CM

## 2023-06-28 DIAGNOSIS — N40.1 BENIGN PROSTATIC HYPERPLASIA WITH NOCTURIA: ICD-10-CM

## 2023-06-28 DIAGNOSIS — N52.9 MALE ERECTILE DISORDER: ICD-10-CM

## 2023-06-28 DIAGNOSIS — G47.33 OSA (OBSTRUCTIVE SLEEP APNEA): ICD-10-CM

## 2023-06-28 DIAGNOSIS — R35.1 BENIGN PROSTATIC HYPERPLASIA WITH NOCTURIA: ICD-10-CM

## 2023-06-28 DIAGNOSIS — E78.5 HYPERLIPIDEMIA LDL GOAL <100: ICD-10-CM

## 2023-06-28 DIAGNOSIS — Z12.5 PROSTATE CANCER SCREENING: ICD-10-CM

## 2023-06-28 LAB
ALBUMIN SERPL BCG-MCNC: 4.5 G/DL (ref 3.5–5.2)
ALP SERPL-CCNC: 81 U/L (ref 40–129)
ALT SERPL W P-5'-P-CCNC: 27 U/L (ref 0–70)
ANION GAP SERPL CALCULATED.3IONS-SCNC: 9 MMOL/L (ref 7–15)
AST SERPL W P-5'-P-CCNC: 17 U/L (ref 0–45)
BILIRUB SERPL-MCNC: 1.2 MG/DL
BUN SERPL-MCNC: 14.1 MG/DL (ref 8–23)
CALCIUM SERPL-MCNC: 9.2 MG/DL (ref 8.8–10.2)
CHLORIDE SERPL-SCNC: 104 MMOL/L (ref 98–107)
CHOLEST SERPL-MCNC: 148 MG/DL
CREAT SERPL-MCNC: 0.81 MG/DL (ref 0.67–1.17)
DEPRECATED HCO3 PLAS-SCNC: 24 MMOL/L (ref 22–29)
ERYTHROCYTE [DISTWIDTH] IN BLOOD BY AUTOMATED COUNT: 13.1 % (ref 10–15)
GFR SERPL CREATININE-BSD FRML MDRD: >90 ML/MIN/1.73M2
GLUCOSE SERPL-MCNC: 107 MG/DL (ref 70–99)
HCT VFR BLD AUTO: 43.2 % (ref 40–53)
HDLC SERPL-MCNC: 42 MG/DL
HGB BLD-MCNC: 13.8 G/DL (ref 13.3–17.7)
LDLC SERPL CALC-MCNC: 89 MG/DL
MCH RBC QN AUTO: 27.1 PG (ref 26.5–33)
MCHC RBC AUTO-ENTMCNC: 31.9 G/DL (ref 31.5–36.5)
MCV RBC AUTO: 85 FL (ref 78–100)
NONHDLC SERPL-MCNC: 106 MG/DL
PLATELET # BLD AUTO: 256 10E3/UL (ref 150–450)
POTASSIUM SERPL-SCNC: 4.4 MMOL/L (ref 3.4–5.3)
PROT SERPL-MCNC: 6.8 G/DL (ref 6.4–8.3)
PSA SERPL DL<=0.01 NG/ML-MCNC: 1.8 NG/ML (ref 0–4.5)
RBC # BLD AUTO: 5.09 10E6/UL (ref 4.4–5.9)
SODIUM SERPL-SCNC: 137 MMOL/L (ref 136–145)
TRIGL SERPL-MCNC: 83 MG/DL
WBC # BLD AUTO: 7.8 10E3/UL (ref 4–11)

## 2023-06-28 PROCEDURE — 80053 COMPREHEN METABOLIC PANEL: CPT | Performed by: INTERNAL MEDICINE

## 2023-06-28 PROCEDURE — 36415 COLL VENOUS BLD VENIPUNCTURE: CPT | Performed by: INTERNAL MEDICINE

## 2023-06-28 PROCEDURE — 99396 PREV VISIT EST AGE 40-64: CPT | Performed by: INTERNAL MEDICINE

## 2023-06-28 PROCEDURE — 85027 COMPLETE CBC AUTOMATED: CPT | Performed by: INTERNAL MEDICINE

## 2023-06-28 PROCEDURE — 80061 LIPID PANEL: CPT | Performed by: INTERNAL MEDICINE

## 2023-06-28 PROCEDURE — G0103 PSA SCREENING: HCPCS | Performed by: INTERNAL MEDICINE

## 2023-06-28 RX ORDER — ROSUVASTATIN CALCIUM 20 MG/1
20 TABLET, COATED ORAL DAILY
Qty: 90 TABLET | Refills: 3 | Status: SHIPPED | OUTPATIENT
Start: 2023-06-28 | End: 2024-07-23

## 2023-06-28 RX ORDER — TAMSULOSIN HYDROCHLORIDE 0.4 MG/1
0.4 CAPSULE ORAL DAILY
Qty: 90 CAPSULE | Refills: 3 | Status: SHIPPED | OUTPATIENT
Start: 2023-06-28 | End: 2023-08-11

## 2023-06-28 RX ORDER — VALACYCLOVIR HYDROCHLORIDE 1 G/1
2000 TABLET, FILM COATED ORAL 2 TIMES DAILY
Qty: 4 TABLET | Refills: 5 | Status: SHIPPED | OUTPATIENT
Start: 2023-06-28

## 2023-06-28 ASSESSMENT — ENCOUNTER SYMPTOMS
DIARRHEA: 0
EYE PAIN: 0
HEADACHES: 0
SORE THROAT: 0
FEVER: 0
NAUSEA: 0
HEMATOCHEZIA: 0
CONSTIPATION: 0
ARTHRALGIAS: 0
PALPITATIONS: 0
JOINT SWELLING: 0
CHILLS: 0
MYALGIAS: 0
ABDOMINAL PAIN: 0
WEAKNESS: 0
DIZZINESS: 0
HEMATURIA: 0
COUGH: 0
SHORTNESS OF BREATH: 0
DYSURIA: 0
PARESTHESIAS: 0
FREQUENCY: 1
HEARTBURN: 0
NERVOUS/ANXIOUS: 0

## 2023-06-28 ASSESSMENT — PAIN SCALES - GENERAL: PAINLEVEL: NO PAIN (0)

## 2023-06-28 NOTE — PROGRESS NOTES
SUBJECTIVE:   CC: Tank is an 62 year old who presents for preventative health visit.        No data to display              Healthy Habits:     Getting at least 3 servings of Calcium per day:  Yes    Bi-annual eye exam:  Yes    Dental care twice a year:  Yes    Sleep apnea or symptoms of sleep apnea:  Sleep apnea    Diet:  Regular (no restrictions)    Frequency of exercise:  None    Taking medications regularly:  Yes    Medication side effects:  Not applicable    PHQ-2 Total Score: 0    Additional concerns today:  No                  Social History     Tobacco Use     Smoking status: Never     Smokeless tobacco: Never   Substance Use Topics     Alcohol use: No     Comment: sober since 2006 6/28/2023     1:31 PM   Alcohol Use   Prescreen: >3 drinks/day or >7 drinks/week? Not Applicable       Last PSA:   PSA   Date Value Ref Range Status   12/23/2020 0.96 0 - 4 ug/L Final     Comment:     Assay Method:  Chemiluminescence using Siemens Vista analyzer     Prostate Specific Antigen Screen   Date Value Ref Range Status   01/19/2022 3.87 0.00 - 4.00 ug/L Final     PSA Tumor Marker   Date Value Ref Range Status   02/16/2022 1.35 0.00 - 4.00 ug/L Final       Reviewed orders with patient. Reviewed health maintenance and updated orders accordingly - Yes  Patient Active Problem List   Diagnosis     Lumbago     RASHID (obstructive sleep apnea)     Hyperlipidemia LDL goal <100     Male erectile disorder     Recurrent cold sores     Past Surgical History:   Procedure Laterality Date     COLONOSCOPY N/A 2/2/2022    Procedure: COLONOSCOPY;  Surgeon: Cristela Boland MD;  Location:  GI     ORTHOPEDIC SURGERY      finger fracture repair      VASECTOMY       wisdom teeth extraction         Social History     Tobacco Use     Smoking status: Never     Smokeless tobacco: Never   Substance Use Topics     Alcohol use: No     Comment: sober since 2006     Family History   Problem Relation Age of Onset     Hypertension Mother       Breast Cancer Mother      Diabetes Father      Prostate Cancer Father      Cancer - colorectal No family hx of          Current Outpatient Medications   Medication Sig Dispense Refill     rosuvastatin (CRESTOR) 20 MG tablet Take 1 tablet (20 mg) by mouth daily 90 tablet 3     sildenafil (VIAGRA) 50 MG tablet TAKE 1/2 TABLET BY MOUTH AS NEEDED - TAKE 30 MINUTES TO 4 HOURS BEFORE SEX - DO NOT USE WITH NITROGLYCERIN,TERAZOSIN, OR DOXASIN 30 tablet 1     tamsulosin (FLOMAX) 0.4 MG capsule Take 1 capsule (0.4 mg) by mouth daily 90 capsule 3     valACYclovir (VALTREX) 1000 mg tablet Take 2 tablets (2,000 mg) by mouth 2 times daily 4 tablet 5     Allergies   Allergen Reactions     Atorvastatin Muscle Pain (Myalgia)       Reviewed and updated as needed this visit by clinical staff    Allergies  Meds              Reviewed and updated as needed this visit by Provider                 Past Medical History:   Diagnosis Date     Alcohol abuse, unspecified     stopped drinking 2006     Other unspecified back disorder     low back pain     Sleep apnea       Past Surgical History:   Procedure Laterality Date     COLONOSCOPY N/A 2/2/2022    Procedure: COLONOSCOPY;  Surgeon: Cristela Boland MD;  Location:  GI     ORTHOPEDIC SURGERY      finger fracture repair      VASECTOMY       wisdom teeth extraction         Review of Systems   Constitutional: Negative for chills and fever.   HENT: Negative for congestion, ear pain, hearing loss and sore throat.    Eyes: Negative for pain and visual disturbance.   Respiratory: Negative for cough and shortness of breath.    Cardiovascular: Negative for chest pain, palpitations and peripheral edema.   Gastrointestinal: Negative for abdominal pain, constipation, diarrhea, heartburn, hematochezia and nausea.   Genitourinary: Positive for frequency and urgency. Negative for dysuria, genital sores, hematuria, impotence and penile discharge.   Musculoskeletal: Negative for arthralgias, joint  swelling and myalgias.   Skin: Negative for rash.   Neurological: Negative for dizziness, weakness, headaches and paresthesias.   Psychiatric/Behavioral: Negative for mood changes. The patient is not nervous/anxious.          OBJECTIVE:   /78 (BP Location: Right arm, Patient Position: Sitting, Cuff Size: Adult Large)   Pulse 60   Temp 97.7  F (36.5  C) (Temporal)   Resp 18   Ht 1.829 m (6')   Wt 105.3 kg (232 lb 3.2 oz)   SpO2 97%   BMI 31.49 kg/m      Physical Exam  GENERAL: healthy, alert and no distress  EYES: Eyes grossly normal to inspection, PERRL and conjunctivae and sclerae normal  HENT: ear canals and TM's normal, nose and mouth without ulcers or lesions  NECK: no adenopathy, no asymmetry, masses, or scars and thyroid normal to palpation  RESP: lungs clear to auscultation - no rales, rhonchi or wheezes  CV: regular rate and rhythm, normal S1 S2, no S3 or S4, no murmur, click or rub, no peripheral edema and peripheral pulses strong  ABDOMEN: soft, nontender, no hepatosplenomegaly, no masses and bowel sounds normal  RECTAL: normal sphincter tone, no rectal masses, prostate normal size, smooth, nontender without nodules or masses  MS: no gross musculoskeletal defects noted, no edema  SKIN: no suspicious lesions or rashes  NEURO: Normal strength and tone, mentation intact and speech normal  PSYCH: mentation appears normal, affect normal/bright    Labs pending     ASSESSMENT/PLAN:       ICD-10-CM    1. Routine general medical examination at a health care facility  Z00.00       2. Benign prostatic hyperplasia with nocturia  N40.1 tamsulosin (FLOMAX) 0.4 MG capsule    R35.1       3. Hyperlipidemia LDL goal <100  E78.5 rosuvastatin (CRESTOR) 20 MG tablet     Lipid panel reflex to direct LDL Fasting     Comprehensive metabolic panel     CBC with platelets      4. Recurrent cold sores  B00.1 valACYclovir (VALTREX) 1000 mg tablet      5. Male erectile disorder  N52.9       6. RASHID (obstructive sleep apnea)   G47.33       7. Prostate cancer screening  Z12.5 PROSTATE SPEC ANTIGEN SCREEN      Discussed risk and side effects of flomax, he would like to start, dizziness could be issue with viagra and so we discussed this, he could skip flomax on days he takes viagra  On statin therapy recheck labs  Refilled valtrex  On CPAP now     Patient has been advised of split billing requirements and indicates understanding: Yes      COUNSELING:   Reviewed preventive health counseling, as reflected in patient instructions  Special attention given to:        Regular exercise       Healthy diet/nutrition       Immunizations    vaccines are up to date             Colorectal cancer screening; repeat 2025       Prostate cancer screening; PSA        Consider lung cancer screening for ages 55-80 years (77 for Medicare) and 20 pack-year smoking history ; never smoker       BMI:   Estimated body mass index is 31.49 kg/m  as calculated from the following:    Height as of this encounter: 1.829 m (6').    Weight as of this encounter: 105.3 kg (232 lb 3.2 oz).   Weight management plan: Discussed healthy diet and exercise guidelines      He reports that he has never smoked. He has never used smokeless tobacco.        Mike Mathur MD  [facility]

## 2023-06-29 NOTE — RESULT ENCOUNTER NOTE
The following letter pertains to your most recent diagnostic tests:    -Liver and gallbladder tests are normal for you. (ALT,AST, Alk phos, bilirubin), kidney function is normal for you (Creatinine, GFR), Sodium is normal, Potassium is normal for you, Calcium is normal for you, the Glucose (blood sugar) is elevated but it is not in the diabetic range (diabetes is defined as a fasting blood sugar reading greater than 126 on two separate occassions).      -Your cholesterol panel looks healthy.     -Your prostate specific antigen (PSA) test result returned normal.     -Your complete blood counts including your hemoglobin returned normal for you.           Bottom line:  These results look OK for you.  Increase physical activity and decrease calories from carbohydrates (starches and sugars) to improve the blood glucose level.        Follow up:  Schedule an appointment for a preventive examination in one year's time, or return sooner if new questions, symptoms or problems arise.        Sincerely,    Dr. Mathur

## 2023-08-10 DIAGNOSIS — R35.1 BENIGN PROSTATIC HYPERPLASIA WITH NOCTURIA: ICD-10-CM

## 2023-08-10 DIAGNOSIS — N40.1 BENIGN PROSTATIC HYPERPLASIA WITH NOCTURIA: ICD-10-CM

## 2023-08-10 RX ORDER — TAMSULOSIN HYDROCHLORIDE 0.4 MG/1
0.4 CAPSULE ORAL DAILY
Qty: 90 CAPSULE | Refills: 3 | OUTPATIENT
Start: 2023-08-10

## 2023-08-11 RX ORDER — TAMSULOSIN HYDROCHLORIDE 0.4 MG/1
0.4 CAPSULE ORAL DAILY
Qty: 90 CAPSULE | Refills: 3 | Status: SHIPPED | OUTPATIENT
Start: 2023-08-11 | End: 2024-08-28

## 2023-08-11 NOTE — TELEPHONE ENCOUNTER
Outpatient Medication Detail     Disp Refills Start End PERLA   tamsulosin (FLOMAX) 0.4 MG capsule 90 capsule 3 6/28/2023  --   Sig - Route: Take 1 capsule (0.4 mg) by mouth daily - Oral   Sent to pharmacy as: Tamsulosin HCl 0.4 MG Oral Capsule (FLOMAX)   Class: E-Prescribe   Order: [id number]   E-Prescribing Status: Receipt confirmed by pharmacy (6/28/2023  2:01 PM CDT)     Pharmacy    [id number] - [facility] [address] AT [facility] [address]       Change in pharmacy to MAIL ORDER, new Rx is needed.      Jessica Castillo RT (R)

## 2023-08-18 ENCOUNTER — E-VISIT (OUTPATIENT)
Dept: FAMILY MEDICINE | Facility: CLINIC | Age: 63
End: 2023-08-18
Payer: COMMERCIAL

## 2023-08-18 DIAGNOSIS — M54.50 LOW BACK PAIN, UNSPECIFIED BACK PAIN LATERALITY, UNSPECIFIED CHRONICITY, UNSPECIFIED WHETHER SCIATICA PRESENT: Primary | ICD-10-CM

## 2023-08-18 PROCEDURE — 99421 OL DIG E/M SVC 5-10 MIN: CPT | Performed by: INTERNAL MEDICINE

## 2023-08-18 NOTE — Clinical Note
Can you help Tank schedule an appointment with me about his back, ok to use same day slot if needed

## 2023-08-18 NOTE — PATIENT INSTRUCTIONS
Caring for Your Back    You are not alone.    Low back pain is very common. Nearly half of all adults will have low back pain in any given year. The good news is that back pain is rarely a danger to your health. Most people can manage their back pain on their own. About half of people start feeling better within two weeks. In 9 out of 10 cases, low back pain goes away or no longer limits daily activity within 6 weeks.     Your outlook is good!     Your symptoms tell us that your low back pain is most likely not a danger to you. Most of the time we will not know the exact cause of low back pain, even if you see a doctor or have an MRI. However, treatment can still work without knowing the cause of the pain. Less than 1 in 100 people need surgery for their back pain.     What can I do about my low back pain?     There are three basic things you can do to ease low back pain and help it go away.     Use heat or cold packs.    Take medicine as directed.    Use positions, movements and exercises.    Using heat or cold packs:    Try cold packs or gentle heat to ease your pain.  Use whichever gives you the most relief. Apply the cold pack or heat for 15 minutes at a time, as often as needed.    Taking medicine:    If taking over-the-counter medicine:    Take ibuprofen (Advil, Motrin) 600 mg three times a day as needed for pain.  OR    Take Aleve (naproxen) 220 to 440 mg two times a day as needed for pain. If your doctor prescribed a muscle relaxant (cyclobenzaprine 10 mg.):    Take   to 1 tablet at bedtime.    Do not drive when taking this medicine. This drug may make you sleepy.     Using positions, movements and exercises:    Research tells us that moving your joints and muscles can help you recover from back pain. Such activity should be simple and gentle. Use the positions below as well as walking to help relieve your pain. Try taking a short walk every 3 to 4 hours during the day. Walk for a few minutes inside your  home or take longer walks outside, on a treadmill or at a mall. Slowly increase the amount of time you walk. Expect discomfort when you begin, but it should lessen as your back starts to heal. When your back feels better, walk daily to keep your back and body healthy.    Finding a position that is comfortable:    When your back pain is new, certain positions will ease your pain. Gently try each of the positions below until you find one that is helpful. Once you find a position of comfort, use it as often as you like when you are resting. You will recover faster if you combine rest with activity.    * Lie on your back with your legs bent. You can do this by placing a pillow under your knees or lie on the floor and rest your lower legs on the seat of a chair.  * Lie on your side with your knees bent and place a pillow between your knees.    Lie on your stomach over pillows.       When should I call my doctor?    Your back pain should improve over the first couple of weeks. As it improves, you should be able to return to your normal activities.  But call your doctor if:      You have a sudden change in your ability to control your bladder or bowels.    You begin to feel tingling in your groin or legs.    The pain spreads down your leg and into your foot.    Your toes, feet or leg muscles begin to feel weak.    You feel generally unwell or sick.    Your pain gets worse.    If you are deaf or hard of hearing, please let us know. We provide many free services including sign language interpreters, oral interpreters, TTYs, telephone amplifiers, note takers and written materials.    For informational purposes only. Not to replace the advice of your health care provider. Copyright   2013 [facility]. All rights reserved. [employer] [id number] - 04/13.

## 2023-08-22 NOTE — PROGRESS NOTES
Assessment & Plan     Spinal stenosis of lumbar region with neurogenic claudication  I suspect that he probably has progression of the spinal stenosis that was identified 13 years ago.  This is probably why he is not making much progress with physical therapy.  An MRI would be able to identify this problem and help us plan for intervention if needed.  The urinary incontinence is much less likely to be a cauda equina syndrome than ramifications of underlying BPH.  However, I think an MRI is indicated because of the presence of this symptom as well.  There are no other signs and symptoms of cauda equina syndrome on my physical exam today nor other other historical features other than the bladder leaking which preceded the onset of his back pain exacerbation.  - MR Lumbar Spine w/o Contrast; Future    Urinary incontinence, unspecified type  Again, I think this is most likely related to the prostate rather than spine pathology.  We discussed the option of adding finasteride to the existing Flomax.  We also discussed my recommendation for seeing a urologist to obtain a PVR and to discuss treatment options if there is incomplete bladder emptying.  At the end of our discussion, he was agreeable to a urology consultation.  - Adult Urology  Referral; Future    Abnormal urine odor  Check for urinary tract infection, treat if infection present.  If infection is present, it adds credence to the notion of seeing a urologist for definitive treatment for possible bladder outlet obstruction.  - UA Macroscopic with reflex to Microscopic and Culture - Lab Collect; Future  - UA Macroscopic with reflex to Microscopic and Culture - Lab Collect  - UA Microscopic with Reflex to Culture    Benign prostatic hyperplasia with nocturia  Refer to discussion above.      No LOS data to display   Time spent by me doing chart review, history and exam, documentation and further activities per the note           Mike Mathur MD  M  [facility] JENNIFER Fu is a 63 year old, presenting for the following health issues:  Back Pain      History of Present Illness       Back Pain:  He presents for follow up of back pain. Patient's back pain is a chronic problem.  Location of back pain:  Right lower back, left lower back, right hip and left hip  Description of back pain: sharp  Back pain spreads: right thigh and left thigh    Since patient first noticed back pain, pain is: always present, but gets better and worse  Does back pain interfere with his job:  No       He eats 0-1 servings of fruits and vegetables daily.He consumes 1 sweetened beverage(s) daily.He exercises with enough effort to increase his heart rate 10 to 19 minutes per day.  He exercises with enough effort to increase his heart rate 3 or less days per week.   He is taking medications regularly.         63-year-old man presents with a several day history of an acute exacerbation of chronic back pain.  The pain originates in his lower back and radiates to his bilateral buttocks.  Under normal circumstances, he tends to walk hunched over although he does not note any improvement when he walks with a shopping cart.  He also relates a several week history of urinary incontinence.  He notes that urinary incontinence seems worse when he drinks a lot of water or coffee.  He sometimes feels an urge to go to the bathroom but does not make it to the bathroom on time before leaking a few drops of urine in his underpants.  He also notes that he has leaking in his underpants after urinating frequently.  He is on Flomax.  He has noted a increased odor to his urine over the last several days and wonders if he needs a urine sample checked?  He denies fevers or chills.  He denies saddle anesthesia.  He denies leg numbness or weakness.  There has been no recent injury or trauma.  The onset of his most recent pain exacerbation to was triggered by bending over to pick something up  on the floor.  He did have an MRI of his lumbar spine performed about 13 years ago which showed mild central spinal stenosis at the L2 level.  I asked him about physical therapy, and he says that he has tried multiple rounds of physical therapy in earnest on multiple occasions over his lifetime without measurable improvement in his symptoms.  He has been using heat and ice but has not been using any over-the-counter NSAIDs.    Review of Systems         Objective    /72 (BP Location: Right arm, Patient Position: Sitting, Cuff Size: Adult Large)   Pulse 56   Temp 97.7  F (36.5  C) (Temporal)   Resp 16   Ht 1.829 m (6')   Wt 107 kg (236 lb)   SpO2 98%   BMI 32.01 kg/m    Body mass index is 32.01 kg/m .  Physical Exam   General: This is a well-appearing man in no acute distress.  He does not appear toxic.  BACK:  No tenderness to palpation over the spinous processes of the thoracic and lumbar spine, deep tendon reflexes are 2+ at the bilateral patella and Achilles tendons, there is 5 out of 5 muscle strength in all lower extremity muscle groups, there is normal sensation to light touch in all lower extremity dermatomes, straight leg raise does not elicit radicular symptoms bilaterally, he does walk hunched over consistent with somebody living with spinal stenosis.  We discussed the rectal exam today but he declined a rectal exam as he had one back in June.

## 2023-08-23 ENCOUNTER — OFFICE VISIT (OUTPATIENT)
Dept: FAMILY MEDICINE | Facility: CLINIC | Age: 63
End: 2023-08-23
Payer: COMMERCIAL

## 2023-08-23 VITALS
WEIGHT: 236 LBS | SYSTOLIC BLOOD PRESSURE: 116 MMHG | DIASTOLIC BLOOD PRESSURE: 72 MMHG | HEART RATE: 56 BPM | OXYGEN SATURATION: 98 % | HEIGHT: 72 IN | TEMPERATURE: 97.7 F | BODY MASS INDEX: 31.97 KG/M2 | RESPIRATION RATE: 16 BRPM

## 2023-08-23 DIAGNOSIS — R82.90 ABNORMAL URINE ODOR: ICD-10-CM

## 2023-08-23 DIAGNOSIS — R35.1 BENIGN PROSTATIC HYPERPLASIA WITH NOCTURIA: ICD-10-CM

## 2023-08-23 DIAGNOSIS — R32 URINARY INCONTINENCE, UNSPECIFIED TYPE: ICD-10-CM

## 2023-08-23 DIAGNOSIS — N39.0 URINARY TRACT INFECTION WITHOUT HEMATURIA, SITE UNSPECIFIED: ICD-10-CM

## 2023-08-23 DIAGNOSIS — M48.062 SPINAL STENOSIS OF LUMBAR REGION WITH NEUROGENIC CLAUDICATION: Primary | ICD-10-CM

## 2023-08-23 DIAGNOSIS — N40.1 BENIGN PROSTATIC HYPERPLASIA WITH NOCTURIA: ICD-10-CM

## 2023-08-23 LAB
ALBUMIN UR-MCNC: NEGATIVE MG/DL
APPEARANCE UR: CLEAR
BACTERIA #/AREA URNS HPF: ABNORMAL /HPF
BILIRUB UR QL STRIP: NEGATIVE
COLOR UR AUTO: YELLOW
GLUCOSE UR STRIP-MCNC: NEGATIVE MG/DL
HGB UR QL STRIP: NEGATIVE
KETONES UR STRIP-MCNC: NEGATIVE MG/DL
LEUKOCYTE ESTERASE UR QL STRIP: ABNORMAL
NITRATE UR QL: NEGATIVE
PH UR STRIP: 6 [PH] (ref 5–7)
RBC #/AREA URNS AUTO: ABNORMAL /HPF
SP GR UR STRIP: 1.02 (ref 1–1.03)
UROBILINOGEN UR STRIP-ACNC: 0.2 E.U./DL
WBC #/AREA URNS AUTO: ABNORMAL /HPF

## 2023-08-23 PROCEDURE — 81001 URINALYSIS AUTO W/SCOPE: CPT | Performed by: INTERNAL MEDICINE

## 2023-08-23 PROCEDURE — 87086 URINE CULTURE/COLONY COUNT: CPT | Performed by: INTERNAL MEDICINE

## 2023-08-23 PROCEDURE — 87186 SC STD MICRODIL/AGAR DIL: CPT | Performed by: INTERNAL MEDICINE

## 2023-08-23 PROCEDURE — 87088 URINE BACTERIA CULTURE: CPT | Performed by: INTERNAL MEDICINE

## 2023-08-23 PROCEDURE — 99214 OFFICE O/P EST MOD 30 MIN: CPT | Performed by: INTERNAL MEDICINE

## 2023-08-23 RX ORDER — SULFAMETHOXAZOLE/TRIMETHOPRIM 800-160 MG
1 TABLET ORAL 2 TIMES DAILY
Qty: 20 TABLET | Refills: 0 | Status: SHIPPED | OUTPATIENT
Start: 2023-08-23 | End: 2023-08-27

## 2023-08-23 ASSESSMENT — PAIN SCALES - GENERAL: PAINLEVEL: NO PAIN (0)

## 2023-08-23 NOTE — RESULT ENCOUNTER NOTE
The following letter pertains to your most recent diagnostic tests:    The initial urine test indicates the possibility of a bladder infection.  I sent the prescription for an antibiotic for bladder infection to your pharmacy.  Please take the antibiotic as directed.  Also, I think that this adds to the importance of seeing the urologist to make sure that you are emptying your bladder adequately, and to discuss treatment options if that is not the case.      Sincerely,    Dr. Mathur

## 2023-08-23 NOTE — LETTER
August 28, 2023      Tank Regalado  [address]  [place] [address]        Dear ,    We are writing to inform you of your test results.    Yumiko Fu,     The urine culture actually revealed a bacteria that may not respond fully to the Bactrim antibiotic that was initially prescribed.   Therefore, I recommend we switch antibiotics to amoxicillin (assuming there is not penicillin allergy).  I sent an prescription for amoxicillin to your pharmacy.  Please stop the Bactrim (trimethoprim sulfa) and start and complete the course of amoxicillin as prescribed.           Resulted Orders   UA Macroscopic with reflex to Microscopic and Culture - Lab Collect   Result Value Ref Range    Color Urine Yellow Colorless, Straw, Light Yellow, Yellow    Appearance Urine Clear Clear    Glucose Urine Negative Negative mg/dL    Bilirubin Urine Negative Negative    Ketones Urine Negative Negative mg/dL    Specific Gravity Urine 1.025 1.003 - 1.035    Blood Urine Negative Negative    pH Urine 6.0 5.0 - 7.0    Protein Albumin Urine Negative Negative mg/dL    Urobilinogen Urine 0.2 0.2, 1.0 E.U./dL    Nitrite Urine Negative Negative    Leukocyte Esterase Urine Small (A) Negative   UA Microscopic with Reflex to Culture   Result Value Ref Range    Bacteria Urine Moderate (A) None Seen /HPF    RBC Urine 0-2 0-2 /HPF /HPF    WBC Urine 10-25 (A) 0-5 /HPF /HPF   Urine Culture   Result Value Ref Range    Culture >100,000 CFU/mL Aerococcus urinae (A)       Comment:      Identification obtained by MALDI-TOF mass spectrometry research use only database. Test characteristics determined and verified by the [facility].       If you have any questions or concerns, please call the clinic at the number listed above.       Sincerely,      Mike Mathur MD

## 2023-08-23 NOTE — RESULT ENCOUNTER NOTE
Can you please call Tank and inform him that he may have a bladder infection and that I sent an antibiotic to his pharmacy for him and he could get started on that as soon as possible

## 2023-08-26 LAB — BACTERIA UR CULT: ABNORMAL

## 2023-08-27 RX ORDER — AMOXICILLIN 875 MG
875 TABLET ORAL 2 TIMES DAILY
Qty: 14 TABLET | Refills: 0 | Status: SHIPPED | OUTPATIENT
Start: 2023-08-27 | End: 2023-11-15

## 2023-08-27 NOTE — RESULT ENCOUNTER NOTE
The following letter pertains to your most recent diagnostic tests:    Yumiko Fu,    The urine culture actually revealed a bacteria that may not respond fully to the Bactrim antibiotic that was initially prescribed.   Therefore, I recommend we switch antibiotics to amoxicillin (assuming there is not penicillin allergy).  I sent an prescription for amoxicillin to your pharmacy.  Please stop the Bactrim (trimethoprim sulfa) and start and complete the course of amoxicillin as prescribed.           Sincerely,    Dr. Mathur

## 2023-08-30 ENCOUNTER — TELEPHONE (OUTPATIENT)
Dept: FAMILY MEDICINE | Facility: CLINIC | Age: 63
End: 2023-08-30
Payer: COMMERCIAL

## 2023-08-30 NOTE — TELEPHONE ENCOUNTER
Patient Contact    Attempt # 1    Was call answered?  No.  Left message on voicemail with information to call triage back.    On callback - please review results/instructions from PCP in above note     Don Oropeza RN  [facility]

## 2023-08-30 NOTE — TELEPHONE ENCOUNTER
----- Message from Mike Mathur MD sent at 8/30/2023  8:22 AM CDT -----  He did not check his MyChart can you call him and make sure he switched antibiotics ?

## 2023-08-31 NOTE — TELEPHONE ENCOUNTER
Called patient regarding PCP message below. He received [facility] Message and has picked up new medication.     Sophie Larson RN on 8/31/2023 at 10:27 AM

## 2023-09-01 ENCOUNTER — ANCILLARY PROCEDURE (OUTPATIENT)
Dept: MRI IMAGING | Facility: CLINIC | Age: 63
End: 2023-09-01
Attending: INTERNAL MEDICINE
Payer: COMMERCIAL

## 2023-09-01 DIAGNOSIS — M48.062 SPINAL STENOSIS OF LUMBAR REGION WITH NEUROGENIC CLAUDICATION: ICD-10-CM

## 2023-09-01 PROCEDURE — 72148 MRI LUMBAR SPINE W/O DYE: CPT

## 2023-09-01 NOTE — RESULT ENCOUNTER NOTE
The following letter pertains to your most recent diagnostic tests:    There are no acutely dangerous findings identified by this MRI scan.  The MRI shows multiple level of degenerative changes in the lower back with the worst problem being on the left side at the L2-L3 level.    Given the extent of your symptoms, I think it would be worth discussing these findings with a spine specialist to see if an injection the lower back or even surgery could help your symptoms.    I sent the  spine clinic a referral and their schedulers should call you in the next few days.        Sincerely,    Dr. Mathur

## 2023-09-09 DIAGNOSIS — E78.5 HYPERLIPIDEMIA LDL GOAL <100: ICD-10-CM

## 2023-09-11 RX ORDER — ROSUVASTATIN CALCIUM 20 MG/1
20 TABLET, COATED ORAL DAILY
Qty: 90 TABLET | Refills: 3 | OUTPATIENT
Start: 2023-09-11

## 2023-09-11 NOTE — TELEPHONE ENCOUNTER
Outpatient Medication Detail     Disp Refills Start End PERLA   rosuvastatin (CRESTOR) 20 MG tablet 90 tablet 3 6/28/2023  No   Sig - Route: Take 1 tablet (20 mg) by mouth daily - Oral   Sent to pharmacy as: Rosuvastatin Calcium 20 MG Oral Tablet (CRESTOR)   Class: E-Prescribe   Order: [id number]   E-Prescribing Status: Receipt confirmed by pharmacy (6/28/2023  2:01 PM CDT)     Pharmacy    [id number] [address] AT [facility] [address]       Refills available in [facility] system, should be able to transfer to alternate location.    Fax sent back to [facility] location to check their system    RT Courtney (R)

## 2023-09-20 ENCOUNTER — VIRTUAL VISIT (OUTPATIENT)
Dept: UROLOGY | Facility: CLINIC | Age: 63
End: 2023-09-20
Attending: INTERNAL MEDICINE
Payer: COMMERCIAL

## 2023-09-20 DIAGNOSIS — Z87.440 PERSONAL HISTORY OF URINARY TRACT INFECTION: Primary | ICD-10-CM

## 2023-09-20 DIAGNOSIS — R32 URINARY INCONTINENCE, UNSPECIFIED TYPE: ICD-10-CM

## 2023-09-20 PROCEDURE — 99204 OFFICE O/P NEW MOD 45 MIN: CPT | Mod: VID | Performed by: PHYSICIAN ASSISTANT

## 2023-09-20 RX ORDER — TAMSULOSIN HYDROCHLORIDE 0.4 MG/1
0.8 CAPSULE ORAL DAILY
Qty: 90 CAPSULE | Refills: 3 | Status: SHIPPED | OUTPATIENT
Start: 2023-09-20 | End: 2024-08-05

## 2023-09-20 NOTE — LETTER
9/20/2023       RE: Darrell Regalado  [address]  [place] [address]     Dear Colleague,    Thank you for referring your patient, Darrell Regalado, to the [facility] at [facility]. Please see a copy of my visit note below.    Virtual Visit Details    Type of service:  Video Visit     Originating Location (pt. Location): Home    Distant Location (provider location):  On-site  Platform used for Video Visit: AmWell  Start time: 11:02am  End time: 11:13am  CC: UTI, LUTS    HPI:  Darrell Regalado is a pleasant 63 year old male who presents in consultation from Dr. Mathur for evaluation of the above. Dribbling, sense of incomplete emptying, nocturia x 2-3, slow stream when Flomax was started inJune. Noting better flow, still with some leakage and frequency, urgency.     UTI in Aug (aerococcus). Noted burning at that time as well as foul odor. Treated with Bactrim.     PSA 1.80 6/28/23.    Mother has stone hx.     Past Medical History:   Diagnosis Date    Alcohol abuse, unspecified     stopped drinking 2006    Other unspecified back disorder     low back pain    Sleep apnea        Past Surgical History:   Procedure Laterality Date    COLONOSCOPY N/A 2/2/2022    Procedure: COLONOSCOPY;  Surgeon: Cristela Boland MD;  Location:  GI    ORTHOPEDIC SURGERY      finger fracture repair     VASECTOMY      wisdom teeth extraction         Social History     Socioeconomic History    Marital status:      Spouse name: Not on file    Number of children: Not on file    Years of education: Not on file    Highest education level: Not on file   Occupational History    Not on file   Tobacco Use    Smoking status: Never    Smokeless tobacco: Never   Vaping Use    Vaping Use: Never used   Substance and Sexual Activity    Alcohol use: No     Comment: sober since 2006    Drug use: No    Sexual activity: Yes     Partners: Female   Other Topics Concern     Parent/sibling w/ CABG, MI or angioplasty before 65F 55M? No   Social History Narrative    Not on file     Social Determinants of Health     Financial Resource Strain: Not on file   Food Insecurity: Not on file   Transportation Needs: Not on file   Physical Activity: Not on file   Stress: Not on file   Social Connections: Not on file   Interpersonal Safety: Not on file   Housing Stability: Not on file       Family History   Problem Relation Age of Onset    Hypertension Mother     Breast Cancer Mother     Diabetes Father     Prostate Cancer Father     Cancer - colorectal No family hx of    Mother--stones    Allergies   Allergen Reactions    Atorvastatin Muscle Pain (Myalgia)       Current Outpatient Medications   Medication    amoxicillin (AMOXIL) 875 MG tablet    rosuvastatin (CRESTOR) 20 MG tablet    sildenafil (VIAGRA) 50 MG tablet    tamsulosin (FLOMAX) 0.4 MG capsule    valACYclovir (VALTREX) 1000 mg tablet     No current facility-administered medications for this visit.         PEx:   There were no vitals taken for this visit.    PSYCH: NAD  EYES: EOMI  MOUTH: MMM  NEURO: AAO x3      A/P: Darrell Regalado is a 63 year old male with Probable BPH w/ LUTS, recent UTI while on Flomax.   -CT Urogram  -Cysto  Increase Flomax to 0.8mg. SE reviewed. Discussed not taking it within 4 hours of Viagra.     Evelyn Herndon PA-C  [facility]      20 minutes spent on the date of the encounter doing chart review, review of outside records, review of test results, interpretation of tests, patient visit and documentation

## 2023-09-20 NOTE — PROGRESS NOTES
Virtual Visit Details    Type of service:  Video Visit     Originating Location (pt. Location): Home    Distant Location (provider location):  On-site  Platform used for Video Visit: AmWell  Start time: 11:02am  End time: 11:13am  CC: UTI, LUTS    HPI:  Darrell Regalado is a pleasant 63 year old male who presents in consultation from Dr. Mathur for evaluation of the above. Dribbling, sense of incomplete emptying, nocturia x 2-3, slow stream when Flomax was started inJune. Noting better flow, still with some leakage and frequency, urgency.     UTI in Aug (aerococcus). Noted burning at that time as well as foul odor. Treated with Bactrim.     PSA 1.80 6/28/23.    Mother has stone hx.     Past Medical History:   Diagnosis Date    Alcohol abuse, unspecified     stopped drinking 2006    Other unspecified back disorder     low back pain    Sleep apnea        Past Surgical History:   Procedure Laterality Date    COLONOSCOPY N/A 2/2/2022    Procedure: COLONOSCOPY;  Surgeon: Cristela Boland MD;  Location:  GI    ORTHOPEDIC SURGERY      finger fracture repair     VASECTOMY      wisdom teeth extraction         Social History     Socioeconomic History    Marital status:      Spouse name: Not on file    Number of children: Not on file    Years of education: Not on file    Highest education level: Not on file   Occupational History    Not on file   Tobacco Use    Smoking status: Never    Smokeless tobacco: Never   Vaping Use    Vaping Use: Never used   Substance and Sexual Activity    Alcohol use: No     Comment: sober since 2006    Drug use: No    Sexual activity: Yes     Partners: Female   Other Topics Concern    Parent/sibling w/ CABG, MI or angioplasty before 65F 55M? No   Social History Narrative    Not on file     Social Determinants of Health     Financial Resource Strain: Not on file   Food Insecurity: Not on file   Transportation Needs: Not on file   Physical Activity: Not on file   Stress: Not on file    Social Connections: Not on file   Interpersonal Safety: Not on file   Housing Stability: Not on file       Family History   Problem Relation Age of Onset    Hypertension Mother     Breast Cancer Mother     Diabetes Father     Prostate Cancer Father     Cancer - colorectal No family hx of    Mother--stones    Allergies   Allergen Reactions    Atorvastatin Muscle Pain (Myalgia)       Current Outpatient Medications   Medication    amoxicillin (AMOXIL) 875 MG tablet    rosuvastatin (CRESTOR) 20 MG tablet    sildenafil (VIAGRA) 50 MG tablet    tamsulosin (FLOMAX) 0.4 MG capsule    valACYclovir (VALTREX) 1000 mg tablet     No current facility-administered medications for this visit.         PEx:   There were no vitals taken for this visit.    PSYCH: NAD  EYES: EOMI  MOUTH: MMM  NEURO: AAO x3      A/P: Darrell Regalado is a 63 year old male with Probable BPH w/ LUTS, recent UTI while on Flomax.   -CT Urogram  -Cysto  Increase Flomax to 0.8mg. SE reviewed. Discussed not taking it within 4 hours of Viagra.     Evelyn Herndon PA-C  [facility]        20 minutes spent on the date of the encounter doing chart review, review of outside records, review of test results, interpretation of tests, patient visit and documentation

## 2023-09-20 NOTE — PATIENT INSTRUCTIONS
- CT scan: Please call [phone] to schedule this.       CYSTOSCOPY    What is a Cystoscopy?  This is a procedure done to check for problems inside the bladder/prostate.  Problems may include polyps (growths), tumors, inflammation (swelling and redness), obstruction and other concerns.    The Urologist inserts a thin tube (called a cystoscope) into the bladder.  The tube is about the size of a pencil.  We will give you numbing medicine to reduce the pain or discomfort you may feel.    The Urologist will be able to see inside the bladder by filling the bladder with water.  The water makes it easier to see any problems that may be present. You will have a sense to need to urinate and this is normal.       How should I get ready for the exam?  Nothing to do to prepare. You may eat normally the day of the exam. There is no sedation, so you may drive yourself to and from if you can drive.       Please tell your doctor if:  You have a history of urinary tract infections.  You know that you have a tumor in your bladder.  You have bleeding problems.  You have any allergies.  You are or may be pregnant.      What happens after the exam?  You may go back to your normal diet and activity as you feel ready.    For the next two days after the exam, you may notice:  Some blood in your urine.  Some burning when you urinate (use the toilet).  An urge to urinate more often.  Bladder spasms.    These are normal after the procedure. They should go away on their own after a day or two.      You can help to relieve the above listed symptoms by:  Drinking 6 to 8 large glasses of water each day (includes drinks at meals).  This will help clear the urine.  Take warm baths to relieve pain and bladder spasms.  Do not add anything to the bath water.  You may take Tylenol (acetaminophen) per label instructions for discomfort.

## 2023-09-20 NOTE — NURSING NOTE
Is the patient currently in the state of [place]? YES    Visit mode:VIDEO    If the visit is dropped, the patient can be reconnected by: TELEPHONE VISIT: Phone number:   Telephone Information:   Mobile [phone]       Will anyone else be joining the visit? NO  (If patient encounters technical issues they should call [phone]    How would you like to obtain your AVS? MyChart    Are changes needed to the allergy or medication list? Pt stated no med changes    Reason for visit: Video Visit (Incontinence )    Jacklyn CAVAZOS

## 2023-09-27 ENCOUNTER — HOSPITAL ENCOUNTER (OUTPATIENT)
Dept: CT IMAGING | Facility: CLINIC | Age: 63
Discharge: HOME | End: 2023-09-27
Attending: PHYSICIAN ASSISTANT | Admitting: PHYSICIAN ASSISTANT
Payer: COMMERCIAL

## 2023-09-27 DIAGNOSIS — R32 URINARY INCONTINENCE, UNSPECIFIED TYPE: ICD-10-CM

## 2023-09-27 PROCEDURE — 250N000011 HC RX IP 250 OP 636: Performed by: PHYSICIAN ASSISTANT

## 2023-09-27 PROCEDURE — 74178 CT ABD&PLV WO CNTR FLWD CNTR: CPT

## 2023-09-27 PROCEDURE — 250N000009 HC RX 250: Performed by: PHYSICIAN ASSISTANT

## 2023-09-27 RX ORDER — IOPAMIDOL 755 MG/ML
116 INJECTION, SOLUTION INTRAVASCULAR ONCE
Status: COMPLETED | OUTPATIENT
Start: 2023-09-27 | End: 2023-09-27

## 2023-09-27 RX ADMIN — IOPAMIDOL 116 ML: 755 INJECTION, SOLUTION INTRAVENOUS at 07:18

## 2023-09-27 RX ADMIN — SODIUM CHLORIDE 79 ML: 9 INJECTION, SOLUTION INTRAVENOUS at 07:18

## 2023-10-11 ENCOUNTER — OFFICE VISIT (OUTPATIENT)
Dept: PALLIATIVE MEDICINE | Facility: CLINIC | Age: 63
End: 2023-10-11
Attending: INTERNAL MEDICINE
Payer: COMMERCIAL

## 2023-10-11 ENCOUNTER — NURSE TRIAGE (OUTPATIENT)
Dept: FAMILY MEDICINE | Facility: CLINIC | Age: 63
End: 2023-10-11

## 2023-10-11 VITALS — DIASTOLIC BLOOD PRESSURE: 75 MMHG | SYSTOLIC BLOOD PRESSURE: 129 MMHG | OXYGEN SATURATION: 98 % | HEART RATE: 64 BPM

## 2023-10-11 DIAGNOSIS — M54.16 LUMBAR RADICULOPATHY: Primary | ICD-10-CM

## 2023-10-11 DIAGNOSIS — R39.89 SUSPECTED UTI: Primary | ICD-10-CM

## 2023-10-11 DIAGNOSIS — M48.062 SPINAL STENOSIS OF LUMBAR REGION WITH NEUROGENIC CLAUDICATION: ICD-10-CM

## 2023-10-11 PROCEDURE — 99204 OFFICE O/P NEW MOD 45 MIN: CPT | Performed by: PAIN MEDICINE

## 2023-10-11 RX ORDER — GABAPENTIN 100 MG/1
100 CAPSULE ORAL 3 TIMES DAILY
Qty: 90 CAPSULE | Refills: 1 | Status: SHIPPED | OUTPATIENT
Start: 2023-10-11 | End: 2023-11-07

## 2023-10-11 ASSESSMENT — PAIN SCALES - GENERAL: PAINLEVEL: MILD PAIN (3)

## 2023-10-11 NOTE — PATIENT INSTRUCTIONS
- Further procedures recommended: consider bilateral L2-3 TRANSFORAMINAL EPIDURAL STEROID INJECTION    - Medication Management:  start gabapentin low dose 100mg three times a day. 100mg in the pm for 3 days--> add 100mg in the am for 3 days--> 100mg in the afternoon as tolerated. If tolerating after 2 weeks increase to 200mg three times a day and call/mychart clinic to discuss further titration.   - Physical Therapy:  ordered   - Clinical Health Psychologist to address issues of relaxation, behavioral change, coping style, and other factors important to improvement: no  - Diagnostic Studies: reviewed with patient  - Urine toxicology screen today: no   - Follow up:    1-2 months or sooner if interested in the procedure.         ----------------------------------------------------------------  Clinic Number:  [phone]   Call with any questions about your care and for scheduling assistance.   Calls are returned Monday through Friday between 8 AM and 4:30 PM. We usually get back to you within 2 business days depending on the issue/request.    If we are prescribing your medications:  For opioid medication refills, call the clinic or send a [employer] message 7 days in advance.  Please include:  Name of requested medication  Name of the pharmacy.  For non-opioid medications, call your pharmacy directly to request a refill. Please allow 3-4 days to be processed.   Per [place] State Law:  All controlled substance prescriptions must be filled within 30 days of being written.    For those controlled substances allowing refills, pickup must occur within 30 days of last fill.      We believe regular attendance is key to your success in our program!    Any time you are unable to keep your appointment we ask that you call us at least 24 hours in advance to cancel.This will allow us to offer the appointment time to another patient.   Multiple missed appointments may lead to dismissal from the clinic.

## 2023-10-11 NOTE — PROGRESS NOTES
[facility]    Date of visit: 10/11/2023    Primary Care Provider: Dr. Mike Mathur    Reason for consultation:    Darrell Regalado is a 63 year old male who is seen in consultation today at the request of his primary care physician, Mike Mathur .       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  Intermittent flares  Has been getting worse of late  Denies any specific new event  bilat mainly over ant thight  The pain varies in nature and severity  + pain tight squeezing  Denies numbness ting or burning  Worse with weather  Turning worse  Nothing specifically helps  PHYSICAL THERAPY  in the past no real benefit   Does not have a daily regimen that he does  Denies falls  Denies weakness  Denies incont does have some issues with empying  Reports some difficulty with sleep 2/2 to pain  Pain sig affecting his quality of life   Denies red flags including: bowel or bladder symptoms, fever, chills, saddle anesthesia, profound motor loss, history of cancer, history of immune compromise, weight loss.     Impact of Pain: There is significant limitation     Current medication treatments include:  no  Pain medications are being prescribed by .    Previous medication treatments included:  n    Other treatments have included:  Darrell Regalado has been seen at a pain clinic in the past.      PT: not recently      Interventional:  Acupuncture/chiro: n  TENs Unit: n  Injections: epidural yrs ago       Past Medical History:  Past Medical History:   Diagnosis Date    Alcohol abuse, unspecified     stopped drinking 2006    Other unspecified back disorder     low back pain    Sleep apnea      Past Surgical History:  Past Surgical History:   Procedure Laterality Date    COLONOSCOPY N/A 2/2/2022    Procedure: COLONOSCOPY;  Surgeon: Cristela Boland MD;  Location:  GI    ORTHOPEDIC  SURGERY      finger fracture repair     VASECTOMY      wisdom teeth extraction       Medications:  Current Outpatient Medications   Medication Sig Dispense Refill    amoxicillin (AMOXIL) 875 MG tablet Take 1 tablet (875 mg) by mouth 2 times daily 14 tablet 0    rosuvastatin (CRESTOR) 20 MG tablet Take 1 tablet (20 mg) by mouth daily 90 tablet 3    sildenafil (VIAGRA) 50 MG tablet TAKE 1/2 TABLET BY MOUTH AS NEEDED - TAKE 30 MINUTES TO 4 HOURS BEFORE SEX - DO NOT USE WITH NITROGLYCERIN,TERAZOSIN, OR DOXASIN 30 tablet 1    tamsulosin (FLOMAX) 0.4 MG capsule Take 2 capsules (0.8 mg) by mouth daily 90 capsule 3    tamsulosin (FLOMAX) 0.4 MG capsule Take 1 capsule (0.4 mg) by mouth daily 90 capsule 3    valACYclovir (VALTREX) 1000 mg tablet Take 2 tablets (2,000 mg) by mouth 2 times daily 4 tablet 5     Allergies:     Allergies   Allergen Reactions    Atorvastatin Muscle Pain (Myalgia)       Family history:  Family History   Problem Relation Age of Onset    Hypertension Mother     Breast Cancer Mother     Diabetes Father     Prostate Cancer Father     Cancer - colorectal No family hx of              Diagnostic Tests:  Reviewed with pt     Physical Exam:  Vitals:    10/11/23 0810   BP: 129/75   Pulse: 64   SpO2: 98%     Exam:  Constitutional: healthy, alert, and no distress  Head: normocephalic. Atraumatic.   Eyes: no redness or jaundice noted   ENT: oropharnx normal.  MMM.  Neck supple  Skin: no suspicious lesions or rashes  Psychiatric: mentation appears normal and affect normal/bright    Musculoskeletal exam:  Gait/Station/Posture: wnl  Cervical spine: wnl  Thoracic spine:  Normal   Lumbar spine: wnl  Myofascial tenderness:  mild  Straight leg exam: mild on the l;eft  Sacroiliac (SI) joint tenderness: n  Greater trochanteric tenderness: n  Piriformis tenderness: n  NAZ/FADIR difficulty     Neurologic exam:  Motor:  5/5 UE and LE strength    Reflexes:     Achilles:  R:  2/4 L: 2/4   Sensory:  (upper and lower  extremities):   Light  dec left ant thigh   Allodynia: absent    Hyperalgesia: absent         Assessment:  Darrell Regalado is a 63 year old male with a past medical history significant for lbp presenting with acute progressive lbp rad to his ant thigh          Plan:  Diagnosis reviewed, treatment options addressed, and risks/benefits discussed. The patient was involved in shared decision making regarding the plan as laid out below.    - Further procedures recommended: consider bilateral L2-3 TRANSFORAMINAL EPIDURAL STEROID INJECTION    - Medication Management:  start gabapentin low dose 100mg three times a day. 100mg in the pm for 3 days--> add 100mg in the am for 3 days--> 100mg in the afternoon as tolerated. If tolerating after 2 weeks increase to 200mg three times a day and call/mychart clinic to discuss further titration.   - Physical Therapy: would strongly recommend   - Clinical Health Psychologist to address issues of relaxation, behavioral change, coping style, and other factors important to improvement: no  - Diagnostic Studies: reviewed with patient  - Urine toxicology screen today: no   - Follow up:    1-2 months or sooner if interested in the procedure.             The total TIME spent on this patient on the day of the appointment was 30  minutes.   Time spent preparing to see the patient (reviewing records and tests)  Time spend face to face with the patient  Time spent ordering tests, medications, procedures and referrals  Time spent Referring and communicating with other healthcare professionals  Documenting clinical information in Epic  Brayden Brand MD.  Medical Spine Specialist  [facility]

## 2023-10-11 NOTE — TELEPHONE ENCOUNTER
Mike Mathur MD         This patient told his chronic pain management doctor that he thought he had a UTI     His chronic pain management doctor told me about this     Can you call him and triage his symptoms  Set up E. Visit or office visit appointment as appropriate pending triage        SYMPTOMS: burning with urinating, frequency   ONSET: 2 days ago   OTHER: no blood in urine, no flank pain, no fever     Prior UTI: yes Treatment 3-4 weeks ago - went away at that time     Triaged per Epic Triage Protocol, gave care advice which patient plans to follow.  See Care advice tab for more information.  Patent to call back if further questions or concerns.    Per protocol recommended appointment today. Declined team visit, only wants appt with PCP, agreed to submit e-visit through [employer]     Myrna Chino RN on 10/11/2023 at 9:20 AM      Reason for Disposition   Urinating more frequently than usual (i.e., frequency)    Additional Information   Negative: Shock suspected (e.g., cold/pale/clammy skin, too weak to stand, low BP, rapid pulse)   Negative: Sounds like a life-threatening emergency to the triager   Negative: Followed a female genital area injury (e.g., labia, vagina, vulva)   Negative: Followed a male genital area injury (penis, scrotum)   Negative: Vaginal discharge   Negative: Pus (white, yellow) or bloody discharge from end of penis   Negative: Pain or burning with passing urine (urination) and pregnant   Negative: Pain or burning with passing urine (urination) and female   Negative: Pain or burning with passing urine (urination) and male   Negative: Pain or itching in the vulvar area   Negative: Pain in scrotum is main symptom   Negative: Blood in the urine is main symptom   Negative: Symptoms arising from use of a urinary catheter (e.g., Coude, Regan)   Negative: Unable to urinate (or only a few drops) > 4 hours and bladder feels very full (e.g., palpable bladder or strong urge to urinate)   Negative:  Decreased urination and drinking very little and dehydration suspected (e.g., dark urine, no urine > 12 hours, very dry mouth, very lightheaded)   Negative: Patient sounds very sick or weak to the triager   Negative: Fever > 100.4 F  (38.0 C)   Negative: Bad or foul-smelling urine   Negative: Side (flank) or lower back pain present    Protocols used: Urinary Symptoms-A-OH

## 2023-10-12 ENCOUNTER — LAB (OUTPATIENT)
Dept: LAB | Facility: CLINIC | Age: 63
End: 2023-10-12
Payer: COMMERCIAL

## 2023-10-12 DIAGNOSIS — R39.89 SUSPECTED UTI: ICD-10-CM

## 2023-10-12 LAB
ALBUMIN UR-MCNC: NEGATIVE MG/DL
APPEARANCE UR: CLEAR
BILIRUB UR QL STRIP: NEGATIVE
COLOR UR AUTO: YELLOW
GLUCOSE UR STRIP-MCNC: NEGATIVE MG/DL
HGB UR QL STRIP: NEGATIVE
KETONES UR STRIP-MCNC: NEGATIVE MG/DL
LEUKOCYTE ESTERASE UR QL STRIP: NEGATIVE
NITRATE UR QL: NEGATIVE
PH UR STRIP: 5 [PH] (ref 5–7)
SP GR UR STRIP: 1.02 (ref 1–1.03)
UROBILINOGEN UR STRIP-ACNC: 0.2 E.U./DL

## 2023-10-12 PROCEDURE — 87086 URINE CULTURE/COLONY COUNT: CPT

## 2023-10-12 PROCEDURE — 81003 URINALYSIS AUTO W/O SCOPE: CPT | Mod: QW

## 2023-10-14 LAB — BACTERIA UR CULT: NORMAL

## 2023-10-25 ENCOUNTER — OFFICE VISIT (OUTPATIENT)
Dept: UROLOGY | Facility: CLINIC | Age: 63
End: 2023-10-25
Payer: COMMERCIAL

## 2023-10-25 VITALS
SYSTOLIC BLOOD PRESSURE: 110 MMHG | OXYGEN SATURATION: 99 % | HEIGHT: 72 IN | WEIGHT: 235 LBS | HEART RATE: 58 BPM | BODY MASS INDEX: 31.83 KG/M2 | DIASTOLIC BLOOD PRESSURE: 60 MMHG

## 2023-10-25 DIAGNOSIS — Z87.440 PERSONAL HISTORY OF URINARY TRACT INFECTION: Primary | ICD-10-CM

## 2023-10-25 DIAGNOSIS — R39.89 SUSPECTED UTI: ICD-10-CM

## 2023-10-25 DIAGNOSIS — R35.0 BENIGN PROSTATIC HYPERPLASIA WITH URINARY FREQUENCY: ICD-10-CM

## 2023-10-25 DIAGNOSIS — N40.1 BENIGN PROSTATIC HYPERPLASIA WITH URINARY FREQUENCY: ICD-10-CM

## 2023-10-25 LAB
ALBUMIN UR-MCNC: NEGATIVE MG/DL
APPEARANCE UR: CLEAR
BILIRUB UR QL STRIP: NEGATIVE
COLOR UR AUTO: YELLOW
GLUCOSE UR STRIP-MCNC: NEGATIVE MG/DL
HGB UR QL STRIP: NEGATIVE
KETONES UR STRIP-MCNC: NEGATIVE MG/DL
LEUKOCYTE ESTERASE UR QL STRIP: ABNORMAL
NITRATE UR QL: POSITIVE
PH UR STRIP: 6 [PH] (ref 5–7)
RESIDUAL VOLUME (RV) (EXTERNAL): 24
SP GR UR STRIP: <=1.005 (ref 1–1.03)
UROBILINOGEN UR STRIP-ACNC: 0.2 E.U./DL

## 2023-10-25 PROCEDURE — 99214 OFFICE O/P EST MOD 30 MIN: CPT | Mod: 25 | Performed by: STUDENT IN AN ORGANIZED HEALTH CARE EDUCATION/TRAINING PROGRAM

## 2023-10-25 PROCEDURE — 81003 URINALYSIS AUTO W/O SCOPE: CPT | Mod: QW | Performed by: STUDENT IN AN ORGANIZED HEALTH CARE EDUCATION/TRAINING PROGRAM

## 2023-10-25 PROCEDURE — 87186 SC STD MICRODIL/AGAR DIL: CPT | Performed by: STUDENT IN AN ORGANIZED HEALTH CARE EDUCATION/TRAINING PROGRAM

## 2023-10-25 PROCEDURE — 87088 URINE BACTERIA CULTURE: CPT | Performed by: STUDENT IN AN ORGANIZED HEALTH CARE EDUCATION/TRAINING PROGRAM

## 2023-10-25 PROCEDURE — 51798 US URINE CAPACITY MEASURE: CPT | Performed by: STUDENT IN AN ORGANIZED HEALTH CARE EDUCATION/TRAINING PROGRAM

## 2023-10-25 PROCEDURE — 87086 URINE CULTURE/COLONY COUNT: CPT | Performed by: STUDENT IN AN ORGANIZED HEALTH CARE EDUCATION/TRAINING PROGRAM

## 2023-10-25 RX ORDER — NITROFURANTOIN 25; 75 MG/1; MG/1
100 CAPSULE ORAL SEE ADMIN INSTRUCTIONS
Qty: 30 CAPSULE | Refills: 0 | Status: SHIPPED | OUTPATIENT
Start: 2023-10-25 | End: 2024-01-19

## 2023-10-25 ASSESSMENT — PAIN SCALES - GENERAL: PAINLEVEL: NO PAIN (0)

## 2023-10-25 NOTE — LETTER
10/25/2023       RE: Darrell Regalado  [address]  [facility] [address]     Dear Colleague,    Thank you for referring your patient, Darrell Regalado, to the [facility] [place] at [facility]. Please see a copy of my visit note below.    CHIEF COMPLAINT   Darrell Regalado who is a 63 year old male returns today for follow-up of LUTS, UTI    HPI   Darrell Regalado is a 63 year old male returns today for follow-up of LUTS, UTI    Had aerococcus UTI in August. Two weeks ago started feeling UTI symptoms again but urine culture showed 10-50k mixed justin. He is still having dysuria currently    Patient is on tamsulosin 0.8 mg and doing well on this    PHYSICAL EXAM  Patient is a 63 year old  male   Vitals: Blood pressure 110/60, pulse 58, height 1.829 m (6'), weight 106.6 kg (235 lb), SpO2 99%.  Body mass index is 31.87 kg/m .  General Appearance Adult:   Alert, no acute distress, oriented  HENT: throat/mouth:normal, good dentition  Lungs: no respiratory distress, or pursed lip breathing  Heart: No obvious jugular venous distension present  Abdomen: nondistended  Musculoskeltal: extremities normal, no peripheral edema  Skin: no suspicious lesions or rashes  Neuro: Alert, oriented, speech and mentation normal  Psych: affect and mood normal  Gait: Normal  : circ phallus, orthotopic and patent meatus  60 gram prostate, with some nodularity in the mid prostate      PVR 24 ml    Component      Latest Ref Rng 10/25/2023  8:15 AM   Color Urine      Colorless, Straw, Light Yellow, Yellow  Yellow    Appearance Urine      Clear  Clear    Glucose Urine      Negative mg/dL Negative    Bilirubin Urine      Negative  Negative    Ketones Urine      Negative mg/dL Negative    Specific Gravity Urine      1.003 - 1.035  <=1.005    Blood Urine      Negative  Negative    pH Urine      5.0 - 7.0  6.0    Protein Albumin Urine      Negative mg/dL Negative    Urobilinogen  Urine      0.2, 1.0 E.U./dL 0.2    Nitrite Urine      Negative  Positive !    Leukocyte Esterase Urine      Negative  Small !       Legend:  ! Abnormal    ASSESSMENT and PLAN  63 year old male returns today for follow-up of LUTS, UTI    Nodular enlarged but benign feeling prostate. Recent PSA was low at 1.8. he is emptying well with low PVr  Today having dysuria and is nitrite positive, suspect UTI. Will cancel planned cysto and reschedule.     - continue tamsulosin 0.8 mg daily  - start nitrofurantoin empirically, plan for 1 week course then daily until next cystoscopy      Froylan Acuna MD   [facility]  [facility] Phone: [phone]

## 2023-10-25 NOTE — NURSING NOTE
Chief Complaint   Patient presents with    History of UTIs    A cystoscopy was scheduled for today but the patient was nitrate positive so a scope was not done. Patients post void residual was 24 ml today.  Claudia Zacarias LPN

## 2023-10-25 NOTE — PROGRESS NOTES
CHIEF COMPLAINT   Darrell Regalado who is a 63 year old male returns today for follow-up of LUTS, UTI    HPI   Darrell Regalado is a 63 year old male returns today for follow-up of LUTS, UTI    Had aerococcus UTI in August. Two weeks ago started feeling UTI symptoms again but urine culture showed 10-50k mixed justin. He is still having dysuria currently    Patient is on tamsulosin 0.8 mg and doing well on this    PHYSICAL EXAM  Patient is a 63 year old  male   Vitals: Blood pressure 110/60, pulse 58, height 1.829 m (6'), weight 106.6 kg (235 lb), SpO2 99%.  Body mass index is 31.87 kg/m .  General Appearance Adult:   Alert, no acute distress, oriented  HENT: throat/mouth:normal, good dentition  Lungs: no respiratory distress, or pursed lip breathing  Heart: No obvious jugular venous distension present  Abdomen: nondistended  Musculoskeltal: extremities normal, no peripheral edema  Skin: no suspicious lesions or rashes  Neuro: Alert, oriented, speech and mentation normal  Psych: affect and mood normal  Gait: Normal  : circ phallus, orthotopic and patent meatus  60 gram prostate, with some nodularity in the mid prostate      PVR 24 ml    Component      Latest Ref Rng 10/25/2023  8:15 AM   Color Urine      Colorless, Straw, Light Yellow, Yellow  Yellow    Appearance Urine      Clear  Clear    Glucose Urine      Negative mg/dL Negative    Bilirubin Urine      Negative  Negative    Ketones Urine      Negative mg/dL Negative    Specific Gravity Urine      1.003 - 1.035  <=1.005    Blood Urine      Negative  Negative    pH Urine      5.0 - 7.0  6.0    Protein Albumin Urine      Negative mg/dL Negative    Urobilinogen Urine      0.2, 1.0 E.U./dL 0.2    Nitrite Urine      Negative  Positive !    Leukocyte Esterase Urine      Negative  Small !       Legend:  ! Abnormal    ASSESSMENT and PLAN  63 year old male returns today for follow-up of LUTS, UTI    Nodular enlarged but benign feeling prostate. Recent PSA was low at 1.8. he  is emptying well with low PVr  Today having dysuria and is nitrite positive, suspect UTI. Will cancel planned cysto and reschedule.     - continue tamsulosin 0.8 mg daily  - start nitrofurantoin empirically, plan for 1 week course then daily until next cystoscopy      Froylan Acuna MD   [facility]  [facility] Phone: [phone]

## 2023-10-27 LAB — BACTERIA UR CULT: ABNORMAL

## 2023-11-07 ENCOUNTER — MYC REFILL (OUTPATIENT)
Dept: PALLIATIVE MEDICINE | Facility: CLINIC | Age: 63
End: 2023-11-07
Payer: COMMERCIAL

## 2023-11-07 DIAGNOSIS — M54.16 LUMBAR RADICULOPATHY: ICD-10-CM

## 2023-11-08 RX ORDER — GABAPENTIN 100 MG/1
200 CAPSULE ORAL 3 TIMES DAILY
Qty: 180 CAPSULE | Refills: 1 | Status: SHIPPED | OUTPATIENT
Start: 2023-11-08 | End: 2024-01-17

## 2023-11-08 NOTE — TELEPHONE ENCOUNTER
Refills have been requested for the following medications:         gabapentin (NEURONTIN) 100 MG capsule [Brayden Brand]     Preferred pharmacy: [facility] #[id number] [place], [place] - [address]

## 2023-11-08 NOTE — TELEPHONE ENCOUNTER
Received [employer] message from patient requesting refill(s) for     gabapentin (NEURONTIN) 100 MG capsule     Last refilled on 10/11/2023.    Pt last seen on 10/11/2023.  Next appt scheduled for NONE.    Note: Previous encounter is discussion to move to 200mg TID    E-prescribe to:    [id number] - [place] - [address] AT [address] [place] [address] & [facility]     Will route to nursing to verify dosing.

## 2023-11-08 NOTE — TELEPHONE ENCOUNTER
Chart reviewed.   Patient dose increased to 200mg TID.    Routing to provider for review.     Tila Ac, RAFIQ  [facility]  [phone]

## 2023-11-15 ENCOUNTER — OFFICE VISIT (OUTPATIENT)
Dept: UROLOGY | Facility: CLINIC | Age: 63
End: 2023-11-15
Payer: COMMERCIAL

## 2023-11-15 VITALS
HEIGHT: 72 IN | OXYGEN SATURATION: 96 % | SYSTOLIC BLOOD PRESSURE: 140 MMHG | HEART RATE: 71 BPM | WEIGHT: 235 LBS | BODY MASS INDEX: 31.83 KG/M2 | DIASTOLIC BLOOD PRESSURE: 72 MMHG

## 2023-11-15 DIAGNOSIS — Z87.440 PERSONAL HISTORY OF URINARY TRACT INFECTION: Primary | ICD-10-CM

## 2023-11-15 DIAGNOSIS — N35.912 BULBOUS URETHRAL STRICTURE: ICD-10-CM

## 2023-11-15 DIAGNOSIS — N40.1 BENIGN PROSTATIC HYPERPLASIA WITH URINARY FREQUENCY: ICD-10-CM

## 2023-11-15 DIAGNOSIS — R35.0 BENIGN PROSTATIC HYPERPLASIA WITH URINARY FREQUENCY: ICD-10-CM

## 2023-11-15 LAB
ALBUMIN UR-MCNC: NEGATIVE MG/DL
APPEARANCE UR: CLEAR
BILIRUB UR QL STRIP: NEGATIVE
COLOR UR AUTO: YELLOW
GLUCOSE UR STRIP-MCNC: NEGATIVE MG/DL
HGB UR QL STRIP: NEGATIVE
KETONES UR STRIP-MCNC: NEGATIVE MG/DL
LEUKOCYTE ESTERASE UR QL STRIP: ABNORMAL
NITRATE UR QL: NEGATIVE
PH UR STRIP: 6.5 [PH] (ref 5–7)
SP GR UR STRIP: 1.01 (ref 1–1.03)
UROBILINOGEN UR STRIP-ACNC: 0.2 E.U./DL

## 2023-11-15 PROCEDURE — 81003 URINALYSIS AUTO W/O SCOPE: CPT | Mod: QW | Performed by: STUDENT IN AN ORGANIZED HEALTH CARE EDUCATION/TRAINING PROGRAM

## 2023-11-15 PROCEDURE — 99213 OFFICE O/P EST LOW 20 MIN: CPT | Mod: 25 | Performed by: STUDENT IN AN ORGANIZED HEALTH CARE EDUCATION/TRAINING PROGRAM

## 2023-11-15 PROCEDURE — 52000 CYSTOURETHROSCOPY: CPT | Performed by: STUDENT IN AN ORGANIZED HEALTH CARE EDUCATION/TRAINING PROGRAM

## 2023-11-15 RX ORDER — LIDOCAINE HYDROCHLORIDE 20 MG/ML
JELLY TOPICAL ONCE
Status: COMPLETED | OUTPATIENT
Start: 2023-11-15 | End: 2023-11-15

## 2023-11-15 RX ADMIN — LIDOCAINE HYDROCHLORIDE 5 ML: 20 JELLY TOPICAL at 09:43

## 2023-11-15 ASSESSMENT — PAIN SCALES - GENERAL: PAINLEVEL: NO PAIN (0)

## 2023-11-15 NOTE — LETTER
11/15/2023       RE: Darrell Regalado  [address]  [facility] [place] [facility] [address]     Dear Colleague,    Thank you for referring your patient, Darrell Regalado, to the [facility] [place] at [facility]. Please see a copy of my visit note below.    CHIEF COMPLAINT   Darrell Regalado who is a 63 year old male returns today for follow-up of BPH with LUTS, UTI.      HPI   Darrell Regalado is a 63 year old male returns today for follow-up of BPH with LUTS, UTI    At last visit was found to have Staph epi UTI, sensitive to nitrofurantoin. He was continued on antibiotics and is feeling well currently    PHYSICAL EXAM  Patient is a 63 year old  male   Vitals: Blood pressure (!) 140/72, pulse 71, height 1.829 m (6'), weight 106.6 kg (235 lb), SpO2 96%.  Body mass index is 31.87 kg/m .  General Appearance Adult:   Alert, no acute distress, oriented  HENT: throat/mouth:normal, good dentition  Lungs: no respiratory distress, or pursed lip breathing  Heart: No obvious jugular venous distension present  Abdomen: nondistended  Musculoskeltal: extremities normal, no peripheral edema  Skin: no suspicious lesions or rashes  Neuro: Alert, oriented, speech and mentation normal  Psych: affect and mood normal  Gait: Normal  : circ phallus    PRE-PROCEDURE DIAGNOSIS: recurrent UTI    POST-PROCEDURE DIAGNOSIS: recurrent UTI, bulbar urethral stricture, BPH    PROCEDURE: Cystoscopy     DESCRIPTION OF PROCEDURE: After informed consent was obtained, the patient was brought to the procedure room where he was placed in the supine position with all pressure points well padded.  The penis was prepped and draped in sterile fashion. A flexible cystoscope was introduced through a well-lubricated urethra.     Normal anterior urethra up to the bulbar urethra where there were multiple wide caliber rings of scarring, easily traversed with scope  Prostate about 3 cm with bilobar obstructive  hypertrophy  Bladder with mild trabeculation without cellules or diverticula; scattered cystitis cystica  The flexible cystoscope was removed and the findings were described to the patient.       ASSESSMENT and PLAN  63 year old male returns today for follow-up of BPH with LUTS, UTI    On cystoscopy he has a bulbar urethral stricture which at this time is wide caliber and not obstructive. He has BPH with bilobar obstructive hypertrophy. He should continue on tamsulosin 0.8 mg daily. He has completed his antibiotic course. Will monitor symptoms for now. If he has another infection, will need to consider a bladder outlet procedure to open up the prostatic urethra (note that he is emptying well based on prior PVR 24 ml)    - return 1 year with UA, PVR, AUA symptom score, PSA prior          Froylan Acuna MD   [facility]  [facility] Phone: [phone]

## 2023-11-15 NOTE — PATIENT INSTRUCTIONS

## 2023-11-15 NOTE — NURSING NOTE
Chief Complaint   Patient presents with    History of UTIs     In office cystoscopy.    Prior to the start of the procedure and with procedural staff participation, I verbally confirmed the patient s identity using two indicators, relevant allergies, that the procedure was appropriate and matched the consent or emergent situation, and that the correct equipment/implants were available. Immediately prior to starting the procedure I conducted the Time Out with the procedural staff and re-confirmed the patient s name, procedure, and site/side. I have wiped the patient off with the povidone-Iodine solution, draped them,  used Lidocaine hydrochloride jelly, and instilled sterile water into the bladder. (The Joint Commission universal protocol was followed.)  Yes    Sedation (Moderate or Deep): None   5mL 2% lidocaine hydrochloride Urojet instilled into urethra.    NDC# [address]-0  Lot #: [id number]  Expiration Date:  4-25  Claudia Zacarias LPN

## 2023-11-15 NOTE — PROGRESS NOTES
CHIEF COMPLAINT   Darrell Regalado who is a 63 year old male returns today for follow-up of BPH with LUTS, UTI.      HPI   Darrell Regalado is a 63 year old male returns today for follow-up of BPH with LUTS, UTI    At last visit was found to have Staph epi UTI, sensitive to nitrofurantoin. He was continued on antibiotics and is feeling well currently    PHYSICAL EXAM  Patient is a 63 year old  male   Vitals: Blood pressure (!) 140/72, pulse 71, height 1.829 m (6'), weight 106.6 kg (235 lb), SpO2 96%.  Body mass index is 31.87 kg/m .  General Appearance Adult:   Alert, no acute distress, oriented  HENT: throat/mouth:normal, good dentition  Lungs: no respiratory distress, or pursed lip breathing  Heart: No obvious jugular venous distension present  Abdomen: nondistended  Musculoskeltal: extremities normal, no peripheral edema  Skin: no suspicious lesions or rashes  Neuro: Alert, oriented, speech and mentation normal  Psych: affect and mood normal  Gait: Normal  : circ phallus    PRE-PROCEDURE DIAGNOSIS: recurrent UTI    POST-PROCEDURE DIAGNOSIS: recurrent UTI, bulbar urethral stricture, BPH    PROCEDURE: Cystoscopy     DESCRIPTION OF PROCEDURE: After informed consent was obtained, the patient was brought to the procedure room where he was placed in the supine position with all pressure points well padded.  The penis was prepped and draped in sterile fashion. A flexible cystoscope was introduced through a well-lubricated urethra.     Normal anterior urethra up to the bulbar urethra where there were multiple wide caliber rings of scarring, easily traversed with scope  Prostate about 3 cm with bilobar obstructive hypertrophy  Bladder with mild trabeculation without cellules or diverticula; scattered cystitis cystica  The flexible cystoscope was removed and the findings were described to the patient.       ASSESSMENT and PLAN  63 year old male returns today for follow-up of BPH with LUTS, UTI    On cystoscopy he has a  bulbar urethral stricture which at this time is wide caliber and not obstructive. He has BPH with bilobar obstructive hypertrophy. He should continue on tamsulosin 0.8 mg daily. He has completed his antibiotic course. Will monitor symptoms for now. If he has another infection, will need to consider a bladder outlet procedure to open up the prostatic urethra (note that he is emptying well based on prior PVR 24 ml)    - return 1 year with UA, PVR, AUA symptom score, PSA prior          Froylan Acuna MD   [facility]  [facility] Phone: [phone]

## 2023-12-17 ENCOUNTER — OFFICE VISIT (OUTPATIENT)
Dept: URGENT CARE | Facility: URGENT CARE | Age: 63
End: 2023-12-17
Payer: COMMERCIAL

## 2023-12-17 VITALS
DIASTOLIC BLOOD PRESSURE: 70 MMHG | RESPIRATION RATE: 16 BRPM | HEART RATE: 60 BPM | TEMPERATURE: 97.2 F | OXYGEN SATURATION: 99 % | SYSTOLIC BLOOD PRESSURE: 124 MMHG

## 2023-12-17 DIAGNOSIS — R30.0 DYSURIA: Primary | ICD-10-CM

## 2023-12-17 DIAGNOSIS — N39.0 URINARY TRACT INFECTION WITHOUT HEMATURIA, SITE UNSPECIFIED: ICD-10-CM

## 2023-12-17 LAB
ALBUMIN UR-MCNC: NEGATIVE MG/DL
APPEARANCE UR: CLEAR
BACTERIA #/AREA URNS HPF: ABNORMAL /HPF
BILIRUB UR QL STRIP: NEGATIVE
COLOR UR AUTO: YELLOW
GLUCOSE UR STRIP-MCNC: NEGATIVE MG/DL
HGB UR QL STRIP: NEGATIVE
KETONES UR STRIP-MCNC: NEGATIVE MG/DL
LEUKOCYTE ESTERASE UR QL STRIP: ABNORMAL
NITRATE UR QL: POSITIVE
PH UR STRIP: 6 [PH] (ref 5–7)
RBC #/AREA URNS AUTO: ABNORMAL /HPF
SP GR UR STRIP: 1.02 (ref 1–1.03)
UROBILINOGEN UR STRIP-ACNC: 0.2 E.U./DL
WBC #/AREA URNS AUTO: ABNORMAL /HPF

## 2023-12-17 PROCEDURE — 87088 URINE BACTERIA CULTURE: CPT | Performed by: FAMILY MEDICINE

## 2023-12-17 PROCEDURE — 87086 URINE CULTURE/COLONY COUNT: CPT | Performed by: FAMILY MEDICINE

## 2023-12-17 PROCEDURE — 87186 SC STD MICRODIL/AGAR DIL: CPT | Performed by: FAMILY MEDICINE

## 2023-12-17 PROCEDURE — 99214 OFFICE O/P EST MOD 30 MIN: CPT | Performed by: FAMILY MEDICINE

## 2023-12-17 PROCEDURE — 81001 URINALYSIS AUTO W/SCOPE: CPT | Performed by: FAMILY MEDICINE

## 2023-12-17 RX ORDER — SULFAMETHOXAZOLE/TRIMETHOPRIM 800-160 MG
1 TABLET ORAL 2 TIMES DAILY
Qty: 20 TABLET | Refills: 0 | Status: SHIPPED | OUTPATIENT
Start: 2023-12-17 | End: 2023-12-27

## 2023-12-17 NOTE — PROGRESS NOTES
SUBJECTIVE: Darrell Regalado is a 63 year old male who  presents today for a possible UTI.   Symptoms of dysuria and frequency have been going on forday(s).    Hematuria no.  still present  There is no history of fever, chills, nausea or vomiting.   This patient does have a history of urinary tract infections.   Patient denies long duration and flank pain    Past Medical History:   Diagnosis Date    Alcohol abuse, unspecified     stopped drinking 2006    Other unspecified back disorder     low back pain    Sleep apnea      Allergies   Allergen Reactions    Atorvastatin Muscle Pain (Myalgia)     Social History     Tobacco Use    Smoking status: Never    Smokeless tobacco: Never   Substance Use Topics    Alcohol use: No     Comment: sober since 2006       ROS: CONSTITUTIONAL:NEGATIVE for fever, chills, change in weight    OBJECTIVE:  /70   Pulse 60   Temp 97.2  F (36.2  C) (Tympanic)   Resp 16   SpO2 99%   NAD  No Flank/abd pain      ICD-10-CM    1. Dysuria  R30.0 UA Macroscopic with reflex to Microscopic and Culture - Clinic Collect     Urine Microscopic Exam     Urine Culture      2. Urinary tract infection without hematuria, site unspecified  N39.0 sulfamethoxazole-trimethoprim (BACTRIM DS) 800-160 MG tablet        P will f/unit(s) Uology  Drink plenty of fluids.  Prevention and treatment of UTI's discussed.Signs and symptoms of pyelonephritis mentioned.  Follow up with primary care physician if not improving

## 2023-12-20 DIAGNOSIS — Z87.440 PERSONAL HISTORY OF URINARY TRACT INFECTION: Primary | ICD-10-CM

## 2023-12-20 LAB
BACTERIA UR CULT: ABNORMAL
BACTERIA UR CULT: ABNORMAL

## 2023-12-20 RX ORDER — SULFAMETHOXAZOLE AND TRIMETHOPRIM 400; 80 MG/1; MG/1
1 TABLET ORAL 2 TIMES DAILY
Qty: 14 TABLET | Refills: 0 | Status: SHIPPED | OUTPATIENT
Start: 2023-12-20 | End: 2023-12-27

## 2023-12-20 NOTE — PROGRESS NOTES
UROLOGY    Discussed with pt's daughter, RN at [facility] and reviewed with Dr. Acuna, extending abx for his UTI. Bactrim DS extended to 14 days.     Needs follow-up with Dr. Acuna if UTI does not clear or returns to discuss outlet procedure.     Evelyn Herndon PA-C  [facility]  Office: [place], 8am-4:30pm [phone]

## 2024-01-15 ENCOUNTER — LAB (OUTPATIENT)
Dept: LAB | Facility: CLINIC | Age: 64
End: 2024-01-15
Payer: COMMERCIAL

## 2024-01-15 DIAGNOSIS — R39.89 SUSPECTED UTI: Primary | ICD-10-CM

## 2024-01-15 DIAGNOSIS — R39.89 SUSPECTED UTI: ICD-10-CM

## 2024-01-15 PROCEDURE — 87086 URINE CULTURE/COLONY COUNT: CPT

## 2024-01-15 PROCEDURE — 81003 URINALYSIS AUTO W/O SCOPE: CPT | Mod: QW

## 2024-01-15 PROCEDURE — 87088 URINE BACTERIA CULTURE: CPT

## 2024-01-15 PROCEDURE — 87186 SC STD MICRODIL/AGAR DIL: CPT

## 2024-01-17 DIAGNOSIS — M54.16 LUMBAR RADICULOPATHY: ICD-10-CM

## 2024-01-17 RX ORDER — GABAPENTIN 100 MG/1
200 CAPSULE ORAL 3 TIMES DAILY
Qty: 180 CAPSULE | Refills: 1 | Status: SHIPPED | OUTPATIENT
Start: 2024-01-17 | End: 2024-03-26

## 2024-01-17 NOTE — TELEPHONE ENCOUNTER
Received fax from pharmacy requesting refill(s) for gabapentin (NEURONTIN) 100 MG capsule     Last refilled on 12/12/23    Patient last seen on 10/11/23  Next appt scheduled for None    E-prescribe to:     [id number] - [place], [place] - [address] AT [address] [place] [address]     Will facilitate refill.      Amy Dinero MA  [facility] [place]

## 2024-01-18 DIAGNOSIS — N39.0 URINARY TRACT INFECTION: Primary | ICD-10-CM

## 2024-01-18 LAB
BACTERIA UR CULT: ABNORMAL
BACTERIA UR CULT: ABNORMAL

## 2024-01-18 RX ORDER — NITROFURANTOIN 25; 75 MG/1; MG/1
100 CAPSULE ORAL 2 TIMES DAILY
Qty: 14 CAPSULE | Refills: 0 | Status: SHIPPED | OUTPATIENT
Start: 2024-01-18 | End: 2024-01-19

## 2024-01-19 ENCOUNTER — VIRTUAL VISIT (OUTPATIENT)
Dept: UROLOGY | Facility: CLINIC | Age: 64
End: 2024-01-19
Payer: COMMERCIAL

## 2024-01-19 ENCOUNTER — TELEPHONE (OUTPATIENT)
Dept: UROLOGY | Facility: CLINIC | Age: 64
End: 2024-01-19

## 2024-01-19 DIAGNOSIS — N41.1 CHRONIC BACTERIAL PROSTATITIS: Primary | ICD-10-CM

## 2024-01-19 DIAGNOSIS — N39.0 RECURRENT UTI: ICD-10-CM

## 2024-01-19 PROCEDURE — 99214 OFFICE O/P EST MOD 30 MIN: CPT | Mod: 95 | Performed by: STUDENT IN AN ORGANIZED HEALTH CARE EDUCATION/TRAINING PROGRAM

## 2024-01-19 RX ORDER — SULFAMETHOXAZOLE/TRIMETHOPRIM 800-160 MG
1 TABLET ORAL 2 TIMES DAILY
Qty: 84 TABLET | Refills: 0 | Status: SHIPPED | OUTPATIENT
Start: 2024-01-19 | End: 2024-03-01

## 2024-01-19 ASSESSMENT — PAIN SCALES - GENERAL: PAINLEVEL: NO PAIN (0)

## 2024-01-19 NOTE — TELEPHONE ENCOUNTER
[facility] Call Center    Phone Message    May a detailed message be left on voicemail: yes     Reason for Call: Other: . Pt is calling, stating that due to having UTI's all the time  recommended having a procedure done, pt is wondering if that could just be scheduled instead of the video visit on 2/19/24, please call pt, thank you!    Action Taken: Message routed to:  Other: uro    Travel Screening: Not Applicable

## 2024-01-19 NOTE — LETTER
1/19/2024       RE: Darrell Regalado  [address]  [facility] [address]     Dear Colleague,    Thank you for referring your patient, Darrell Regalado, to the [facility] [place] [facility]. Please see a copy of my visit note below.    CHIEF COMPLAINT   Darrell Regalado who is a 63 year old male returns today for follow-up of BPH with LUTS, recurrent UTI.      HPI   Darrell Regalado is a 63 year old male returns today for follow-up of BPH with LUTS, recurrent/persistent UTI    He has had another UTI; now has had a UTI every month for the past several months. Symptomatic with dysuria.     He has had numerous rounds of antibiotics and the last round was about 2 weeks. He is back on nitrofurantoin now.    Has previously had courses of antibiotics including bactrim, nitrofurantoin     PHYSICAL EXAM  Patient is a 63 year old  male   Vitals: There were no vitals taken for this visit.  There is no height or weight on file to calculate BMI.  General Appearance Adult:   Alert, no acute distress, oriented  HENT: throat/mouth:normal, good dentition  Lungs: no respiratory distress, or pursed lip breathing  Heart: No obvious jugular venous distension present  Skin: no suspicious lesions or rashes  Neuro: Alert, oriented, speech and mentation normal  Psych: affect and mood normal                           Cefotaxime Ceftriaxone Ciprofloxacin Daptomycin Doxycycline Gentamicin Levofloxacin Nitrofurantoin Oxacillin Penicillin Tetracycline Trimethoprim/Sulfamethoxazole Vancomycin     Collected Procedure Specimen Type Organism                01/15/24 Urine Culture Aerobic Bacterial [UQY156] Urine Staphylococcus epidermidis   I S I R S S S  R S S      Urogenital justin                12/17/23 Urine Culture Urine Staphylococcus epidermidis (1)   I S I R S S S  R S S      Staphylococcus epidermidis (2)   I S I R S S S  R S S   10/25/23 Urine Culture Aerobic  Bacterial [[id number]] Urine Staphylococcus epidermidis   I S I R S S S  R S S   08/23/23 Urine Culture Urine Aerococcus urinae S S     I   S S  S         ASSESSMENT and PLAN  63 year old male returns today for follow-up of BPH with LUTS, recurrent/persistent UTI    Recurrent uti, chronic illness with exacerbation. Suspect chronic bacterial prostatitis. I reviewed the prior urine cultures, and since October 2023 has had a persistent infection with the same strain of Staph epidermidis (resistant to gent and tetracycline, intermediate to cipro and doxy).  Recommend a 6 week of course of antibiotics, will choose Bactrim instead of nitrofurantoin (rx drug management)    If has persistent UTI after that, then recommend proceeding with cystoscopy, Greenlight laser photovaporization of the prostate to open up the bladder outlet and remove adenoma which is probably colonized with bacteria. He would need to be on antibiotics before, during and after any surgery    Risks of Greenlight including pain, hematuria, urinary incontinence, retrograde ejaculation/dry orgasm, urinary retention discussed    - stop nitrofurantoin  - start bactrim DS twice a day x 6 weeks  - continue tamsulosin 0.8 mg daily  - will cancel scheduled followup in Feb 2024  - patient will call us if he has further issues after the long course of abx        Froylan Acuna MD   [facility]rology  [facility] Phone: [phone]      Virtual Visit Details    Type of service:  Video Visit     Originating Location (pt. Location): work    Distant Location (provider location):  On-site  Platform used for Video Visit: Deanne

## 2024-01-19 NOTE — NURSING NOTE
Is the patient currently in the state of [place]? YES    Visit mode:VIDEO    If the visit is dropped, the patient can be reconnected by: VIDEO VISIT: Text to cell phone:   Telephone Information:   Mobile [phone]       Will anyone else be joining the visit? NO  (If patient encounters technical issues they should call [phone]    How would you like to obtain your AVS? MyChart    Are changes needed to the allergy or medication list? No    Reason for visit: RECHECK (Talk prostate surgery/)    Beatriz MORELF

## 2024-01-19 NOTE — Clinical Note
Cancel his appointment in February. He will let us know how he wants to follow up, pending his antibiotics

## 2024-01-19 NOTE — PROGRESS NOTES
CHIEF COMPLAINT   Darrell Regalado who is a 63 year old male returns today for follow-up of BPH with LUTS, recurrent UTI.      HPI   Darrell Regalado is a 63 year old male returns today for follow-up of BPH with LUTS, recurrent/persistent UTI    He has had another UTI; now has had a UTI every month for the past several months. Symptomatic with dysuria.     He has had numerous rounds of antibiotics and the last round was about 2 weeks. He is back on nitrofurantoin now.    Has previously had courses of antibiotics including bactrim, nitrofurantoin     PHYSICAL EXAM  Patient is a 63 year old  male   Vitals: There were no vitals taken for this visit.  There is no height or weight on file to calculate BMI.  General Appearance Adult:   Alert, no acute distress, oriented  HENT: throat/mouth:normal, good dentition  Lungs: no respiratory distress, or pursed lip breathing  Heart: No obvious jugular venous distension present  Skin: no suspicious lesions or rashes  Neuro: Alert, oriented, speech and mentation normal  Psych: affect and mood normal                           Cefotaxime Ceftriaxone Ciprofloxacin Daptomycin Doxycycline Gentamicin Levofloxacin Nitrofurantoin Oxacillin Penicillin Tetracycline Trimethoprim/Sulfamethoxazole Vancomycin     Collected Procedure Specimen Type Organism                01/15/24 Urine Culture Aerobic Bacterial [[id number]] Urine Staphylococcus epidermidis   I S I R S S S  R S S      Urogenital justin                12/17/23 Urine Culture Urine Staphylococcus epidermidis (1)   I S I R S S S  R S S      Staphylococcus epidermidis (2)   I S I R S S S  R S S   10/25/23 Urine Culture Aerobic Bacterial [[id number]] Urine Staphylococcus epidermidis   I S I R S S S  R S S   08/23/23 Urine Culture Urine Aerococcus urinae S S     I   S S  S         ASSESSMENT and PLAN  63 year old male returns today for follow-up of BPH with LUTS, recurrent/persistent UTI    Recurrent uti, chronic illness with exacerbation. Suspect  chronic bacterial prostatitis. I reviewed the prior urine cultures, and since October 2023 has had a persistent infection with the same strain of Staph epidermidis (resistant to gent and tetracycline, intermediate to cipro and doxy).  Recommend a 6 week of course of antibiotics, will choose Bactrim instead of nitrofurantoin (rx drug management)    If has persistent UTI after that, then recommend proceeding with cystoscopy, Greenlight laser photovaporization of the prostate to open up the bladder outlet and remove adenoma which is probably colonized with bacteria. He would need to be on antibiotics before, during and after any surgery    Risks of Greenlight including pain, hematuria, urinary incontinence, retrograde ejaculation/dry orgasm, urinary retention discussed    - stop nitrofurantoin  - start bactrim DS twice a day x 6 weeks  - continue tamsulosin 0.8 mg daily  - will cancel scheduled followup in Feb 2024  - patient will call us if he has further issues after the long course of abx        Froylan Acuna MD   [facility]rology  [facility] Phone: [phone]      Virtual Visit Details    Type of service:  Video Visit     Originating Location (pt. Location): work    Distant Location (provider location):  On-site  Platform used for Video Visit: Deanne

## 2024-03-26 DIAGNOSIS — M54.16 LUMBAR RADICULOPATHY: ICD-10-CM

## 2024-03-26 RX ORDER — GABAPENTIN 100 MG/1
200 CAPSULE ORAL 3 TIMES DAILY
Qty: 180 CAPSULE | Refills: 1 | Status: SHIPPED | OUTPATIENT
Start: 2024-03-26 | End: 2024-08-28

## 2024-03-26 NOTE — TELEPHONE ENCOUNTER
Received fax from pharmacy requesting refill(s) for gabapentin (NEURONTIN) 100 MG capsule     Last refilled on 02/13/24    Patient last seen on 10/11/23  Next appt scheduled for None    E-prescribe to:  [id number] - [place], [place] - [address] AT [address] [place] [address]     Will facilitate refill.      Amy Dinero MA  [facility] [place]

## 2024-04-11 ENCOUNTER — OFFICE VISIT (OUTPATIENT)
Dept: URGENT CARE | Facility: URGENT CARE | Age: 64
End: 2024-04-11
Payer: COMMERCIAL

## 2024-04-11 ENCOUNTER — ANCILLARY PROCEDURE (OUTPATIENT)
Dept: GENERAL RADIOLOGY | Facility: CLINIC | Age: 64
End: 2024-04-11
Attending: PHYSICIAN ASSISTANT
Payer: COMMERCIAL

## 2024-04-11 VITALS
SYSTOLIC BLOOD PRESSURE: 156 MMHG | BODY MASS INDEX: 33.23 KG/M2 | HEART RATE: 69 BPM | DIASTOLIC BLOOD PRESSURE: 87 MMHG | OXYGEN SATURATION: 97 % | WEIGHT: 245 LBS | RESPIRATION RATE: 20 BRPM | TEMPERATURE: 98.4 F

## 2024-04-11 VITALS
DIASTOLIC BLOOD PRESSURE: 82 MMHG | BODY MASS INDEX: 33.09 KG/M2 | TEMPERATURE: 97.5 F | OXYGEN SATURATION: 98 % | WEIGHT: 244 LBS | RESPIRATION RATE: 20 BRPM | SYSTOLIC BLOOD PRESSURE: 145 MMHG | HEART RATE: 66 BPM

## 2024-04-11 DIAGNOSIS — R03.0 ELEVATED BP WITHOUT DIAGNOSIS OF HYPERTENSION: ICD-10-CM

## 2024-04-11 DIAGNOSIS — R63.5 WEIGHT GAIN: ICD-10-CM

## 2024-04-11 DIAGNOSIS — R06.02 SHORTNESS OF BREATH: ICD-10-CM

## 2024-04-11 DIAGNOSIS — R30.0 DYSURIA: Primary | ICD-10-CM

## 2024-04-11 DIAGNOSIS — R06.02 SHORTNESS OF BREATH: Primary | ICD-10-CM

## 2024-04-11 DIAGNOSIS — R60.0 BILATERAL LEG EDEMA: ICD-10-CM

## 2024-04-11 DIAGNOSIS — R06.09 DOE (DYSPNEA ON EXERTION): ICD-10-CM

## 2024-04-11 LAB
ALBUMIN UR-MCNC: NEGATIVE MG/DL
ANION GAP SERPL CALCULATED.3IONS-SCNC: 4 MMOL/L (ref 3–14)
APPEARANCE UR: CLEAR
BACTERIA #/AREA URNS HPF: ABNORMAL /HPF
BASOPHILS # BLD AUTO: 0 10E3/UL (ref 0–0.2)
BASOPHILS NFR BLD AUTO: 0 %
BILIRUB UR QL STRIP: NEGATIVE
BUN SERPL-MCNC: 11 MG/DL (ref 7–30)
CALCIUM SERPL-MCNC: 9.3 MG/DL (ref 8.5–10.1)
CHLORIDE BLD-SCNC: 111 MMOL/L (ref 94–109)
CO2 SERPL-SCNC: 27 MMOL/L (ref 20–32)
COLOR UR AUTO: YELLOW
CREAT SERPL-MCNC: 0.7 MG/DL (ref 0.66–1.25)
D DIMER PPP FEU-MCNC: 0.37 UG/ML FEU (ref 0–0.5)
EGFRCR SERPLBLD CKD-EPI 2021: >90 ML/MIN/1.73M2
EOSINOPHIL # BLD AUTO: 0.3 10E3/UL (ref 0–0.7)
EOSINOPHIL NFR BLD AUTO: 3 %
ERYTHROCYTE [DISTWIDTH] IN BLOOD BY AUTOMATED COUNT: 15.3 % (ref 10–15)
GLUCOSE BLD-MCNC: 140 MG/DL (ref 70–99)
GLUCOSE UR STRIP-MCNC: NEGATIVE MG/DL
HBA1C MFR BLD: 6 % (ref 0–5.6)
HCT VFR BLD AUTO: 45 % (ref 40–53)
HGB BLD-MCNC: 15.2 G/DL (ref 13.3–17.7)
HGB UR QL STRIP: NEGATIVE
IMM GRANULOCYTES # BLD: 0.1 10E3/UL
IMM GRANULOCYTES NFR BLD: 1 %
KETONES UR STRIP-MCNC: NEGATIVE MG/DL
LEUKOCYTE ESTERASE UR QL STRIP: ABNORMAL
LYMPHOCYTES # BLD AUTO: 2.6 10E3/UL (ref 0.8–5.3)
LYMPHOCYTES NFR BLD AUTO: 27 %
MCH RBC QN AUTO: 27.8 PG (ref 26.5–33)
MCHC RBC AUTO-ENTMCNC: 33.8 G/DL (ref 31.5–36.5)
MCV RBC AUTO: 82 FL (ref 78–100)
MONOCYTES # BLD AUTO: 0.6 10E3/UL (ref 0–1.3)
MONOCYTES NFR BLD AUTO: 6 %
NEUTROPHILS # BLD AUTO: 6 10E3/UL (ref 1.6–8.3)
NEUTROPHILS NFR BLD AUTO: 63 %
NITRATE UR QL: NEGATIVE
NT-PROBNP SERPL-MCNC: 55 PG/ML (ref 0–900)
PH UR STRIP: 5.5 [PH] (ref 5–7)
PLATELET # BLD AUTO: 237 10E3/UL (ref 150–450)
POTASSIUM BLD-SCNC: 4.2 MMOL/L (ref 3.4–5.3)
RBC # BLD AUTO: 5.47 10E6/UL (ref 4.4–5.9)
RBC #/AREA URNS AUTO: ABNORMAL /HPF
SODIUM SERPL-SCNC: 142 MMOL/L (ref 135–145)
SP GR UR STRIP: 1.01 (ref 1–1.03)
SQUAMOUS #/AREA URNS AUTO: ABNORMAL /LPF
UROBILINOGEN UR STRIP-ACNC: 0.2 E.U./DL
WBC # BLD AUTO: 9.5 10E3/UL (ref 4–11)
WBC #/AREA URNS AUTO: ABNORMAL /HPF

## 2024-04-11 PROCEDURE — 36415 COLL VENOUS BLD VENIPUNCTURE: CPT | Performed by: PHYSICIAN ASSISTANT

## 2024-04-11 PROCEDURE — 81001 URINALYSIS AUTO W/SCOPE: CPT | Performed by: NURSE PRACTITIONER

## 2024-04-11 PROCEDURE — 99215 OFFICE O/P EST HI 40 MIN: CPT | Mod: 25 | Performed by: PHYSICIAN ASSISTANT

## 2024-04-11 PROCEDURE — 93000 ELECTROCARDIOGRAM COMPLETE: CPT | Performed by: PHYSICIAN ASSISTANT

## 2024-04-11 PROCEDURE — 85025 COMPLETE CBC W/AUTO DIFF WBC: CPT | Performed by: PHYSICIAN ASSISTANT

## 2024-04-11 PROCEDURE — 83036 HEMOGLOBIN GLYCOSYLATED A1C: CPT | Performed by: PHYSICIAN ASSISTANT

## 2024-04-11 PROCEDURE — 71046 X-RAY EXAM CHEST 2 VIEWS: CPT | Mod: TC | Performed by: INTERNAL MEDICINE

## 2024-04-11 PROCEDURE — 99207 PR NON-BILLABLE SERV PER CHARTING: CPT | Mod: 25 | Performed by: NURSE PRACTITIONER

## 2024-04-11 PROCEDURE — 85379 FIBRIN DEGRADATION QUANT: CPT | Performed by: PHYSICIAN ASSISTANT

## 2024-04-11 PROCEDURE — 83880 ASSAY OF NATRIURETIC PEPTIDE: CPT | Performed by: PHYSICIAN ASSISTANT

## 2024-04-11 PROCEDURE — 80048 BASIC METABOLIC PNL TOTAL CA: CPT | Performed by: PHYSICIAN ASSISTANT

## 2024-04-11 RX ORDER — ALBUTEROL SULFATE 90 UG/1
2 AEROSOL, METERED RESPIRATORY (INHALATION) EVERY 6 HOURS PRN
Qty: 18 G | Refills: 0 | Status: SHIPPED | OUTPATIENT
Start: 2024-04-11

## 2024-04-11 ASSESSMENT — ENCOUNTER SYMPTOMS
DECREASED CONCENTRATION: 0
CHEST TIGHTNESS: 0
CONFUSION: 0
LIGHT-HEADEDNESS: 0
HEADACHES: 1
SPEECH DIFFICULTY: 0
COUGH: 0
STRIDOR: 0
WEAKNESS: 0
SHORTNESS OF BREATH: 1
DIZZINESS: 0
WHEEZING: 0
PALPITATIONS: 0

## 2024-04-11 NOTE — PATIENT INSTRUCTIONS
Results for orders placed or performed in visit on 04/11/24   UA Macroscopic with reflex to Microscopic and Culture - Clinic Collect     Status: Abnormal    Specimen: Urine, Clean Catch   Result Value Ref Range    Color Urine Yellow Colorless, Straw, Light Yellow, Yellow    Appearance Urine Clear Clear    Glucose Urine Negative Negative mg/dL    Bilirubin Urine Negative Negative    Ketones Urine Negative Negative mg/dL    Specific Gravity Urine 1.010 1.003 - 1.035    Blood Urine Negative Negative    pH Urine 5.5 5.0 - 7.0    Protein Albumin Urine Negative Negative mg/dL    Urobilinogen Urine 0.2 0.2, 1.0 E.U./dL    Nitrite Urine Negative Negative    Leukocyte Esterase Urine Trace (A) Negative   UA Microscopic with Reflex to Culture     Status: Abnormal   Result Value Ref Range    Bacteria Urine Few (A) None Seen /HPF    RBC Urine 0-2 0-2 /HPF /HPF    WBC Urine 0-5 0-5 /HPF /HPF    Squamous Epithelials Urine Few (A) None Seen /LPF    Narrative    Urine Culture not indicated     UA negative  Push fluids  UC not indicated    Normal physical exam, lungs clear.    Encouraged weight loss  Try to reduce sodium in the diet.    BP uncontrolled.      Will monitor at home  recommend follow up with PCP if remains elevated.

## 2024-04-11 NOTE — PROGRESS NOTES
Shortness of breath  - D dimer, quantitative  - BNP-N terminal pro  - CBC with platelets and differential  - XR Chest 2 Views  - albuterol (PROAIR HFA/PROVENTIL HFA/VENTOLIN HFA) 108 (90 Base) MCG/ACT inhaler; Inhale 2 puffs into the lungs every 6 hours as needed for shortness of breath, wheezing or cough  - EKG 12-lead complete w/read - Clinics  - Basic metabolic panel  (Ca, Cl, CO2, Creat, Gluc, K, Na, BUN)    Patient was given an albuterol inhaler to use as needed for shortness of breath episodes.  If shortness of breath goes on longer than 1 week, I would like him to follow-up with his primary care provider for further workup.    Bilateral leg edema  - D dimer, quantitative  - BNP-N terminal pro  - CBC with platelets and differential  - XR Chest 2 Views  - EKG 12-lead complete w/read - Clinics  - Basic metabolic panel  (Ca, Cl, CO2, Creat, Gluc, K, Na, BUN)       Coping with Edema  What is edema?  Edema is the build-up of fluid in the body, which causes swelling. Swelling most commonly occurs in the feet, ankles, lower legs, or hands. But edema can happen anywhere in the body.  Swelling in the belly or chest may be a sign of a more severe problem.  Certain medicines or conditions can make the swelling worse.  Symptoms include:  Feet and lower legs get larger when you sit or walk  Hands feel tight when you make a fist  When you push on the skin, skin stays dented  Shiny, tight skin  Fast weight gain  How is it treated?  Your care team may give you medicine to reduce the swelling.  They may also suggest that you meet with a dietitian. The dietitian can help with food choices to reduce the swelling.  What can I do about the swelling?  Place your feet above your heart 3 times a day: Sit with your feet up on a stool with a pillow. Sit on the bed or couch with 2 pillows under your feet.  Don't stand for long periods of time.  Wear loose-fitting clothes.  Don't cross your legs.  Reduce the salt in your diet.   These  foods are high in salt (sodium):  Chips, soup, broths  Cheese, cottage cheese  Frozen meals, TV dinners  [place], lunch meat, ham  Sauces (soy, canned spaghetti sauce)  Walk or do other exercise.  Wear compression stockings if your care team recommends it.  Drink water as normal.  Weigh yourself every day at the same time to keep track of weight gain.  When should I call my care team?  Call your care team if:  You have a hard time breathing.  You gained 5 pounds or more in 1 week.  Your hands or feet feel cold when you touch them.  You are peeing very little or not at all.  Swelling is moving up your arms or legs.  Your tongue is swelling.  You cannot eat for more than a day.  If you have any side effects, call us. We can help you manage these problems.  For more information, see:  www.[facility].com  www.cancer.org/treatment/treatmentsandsideeffects/physicalsideeffects/dealingwithsymptomsathome  Comments:  __________________________________________  __________________________________________  __________________________________________  __________________________________________  __________________________________________  __________________________________________  __________________________________________  For informational purposes only. Not to replace the advice of your health care provider. Copyright   2014 [facility]. All rights reserved. Clinically reviewed by [facility]. [place] [id number] - REV 10/22.         Patient was advised to return to clinic for reevaluation (either UC or PCP) if symptoms do not improve in 7 days. Patient educated on red flag symptoms and asked to go directly to the ED if these symptoms present themselves.     40 minutes spent by me on the date of the encounter doing chart review, history and exam, documentation and further activities per the note    Rock Garcia PA-C  [facility]RGENT [facility]    Subjective   63 year old who presents to clinic today for the  following health issues:    Edema and Breathing Problem       HPI     Patient presents today for bilateral lower extremity edema for the last 2 weeks. Patient has some leg pain/pressure at night. Denies any pain during the days. He has also been having shortness of breath with exertion. Patient denies any heart palpitations or chest pain. Denies any recent history of URI symptoms. Patient denies any red or swollen area of the legs. No recent leg injury.     No personal history of heart issues. Denies any recent history of lung disease. He had childhood asthma. Denies any smoking history. Denies any fever, chills, or body aches.     Blood pressure was elevated today and he has no history of HTN.     Patient was seen at another urgent care today however he and his daughter believe that they did not receive adequate care there and would like further workup.    Review of Systems   Review of Systems   Respiratory:  Positive for shortness of breath. Negative for cough, chest tightness, wheezing and stridor.    Cardiovascular:  Positive for leg swelling. Negative for chest pain and palpitations.   Neurological:  Positive for headaches (on and off). Negative for dizziness, syncope, speech difficulty, weakness and light-headedness.   Psychiatric/Behavioral:  Negative for confusion and decreased concentration.       See HPI    Objective    Temp: 98.4  F (36.9  C)   BP: (!) 156/87 Pulse: 69   Resp: 20 SpO2: 97 %       Physical Exam   Physical Exam  Constitutional:       General: He is not in acute distress.     Appearance: Normal appearance. He is normal weight. He is not ill-appearing, toxic-appearing or diaphoretic.   HENT:      Head: Normocephalic and atraumatic.   Cardiovascular:      Rate and Rhythm: Normal rate and regular rhythm.      Pulses: Normal pulses.      Heart sounds: Normal heart sounds. No murmur heard.     No friction rub. No gallop.   Pulmonary:      Effort: Pulmonary effort is normal. No respiratory  distress.      Breath sounds: Normal breath sounds. No stridor. No wheezing, rhonchi or rales.   Chest:      Chest wall: No tenderness.   Neurological:      General: No focal deficit present.      Mental Status: He is alert and oriented to person, place, and time. Mental status is at baseline.      Gait: Gait normal.   Psychiatric:         Mood and Affect: Mood normal.         Behavior: Behavior normal.         Thought Content: Thought content normal.         Judgment: Judgment normal.          Xray - Reviewed and interpreted by me.  Negative for acute cardiopulmonary disease.  EKG - Reviewed and interpreted by me appears normal, NSR, PACs present, normal axis, normal intervals, no acute ST/T changes c/w ischemia, no LVH by voltage criteria  Results for orders placed or performed in visit on 04/11/24 (from the past 24 hour(s))   D dimer, quantitative   Result Value Ref Range    D-Dimer Quantitative 0.37 0.00 - 0.50 ug/mL FEU    Narrative    This D-dimer assay is intended for use in conjunction with a clinical pretest probability assessment model to exclude pulmonary embolism (PE) and deep venous thrombosis (DVT) in outpatients suspected of PE or DVT. The cut-off value is 0.50 ug/mL FEU.    For patients 50 years of age or older, the application of age-adjusted cut-off values for D-Dimer may increase the specificity without significant effect on sensitivity. The literature suggested calculation age adjusted cut-off in ug/L = age in years x 10 ug/L. The results in this laboratory are reported as ug/mL rather than ug/L. The calculation for age adjusted cut off in ug/mL= age in years x 0.01 ug/mL. For example, the cut off for a 76 year old male is 76 x 0.01 ug/mL = 0.76 ug/mL (760 ug/L).    M Geovanny et al. Age adjusted D-dimer cut-off levels to rule out pulmonary embolism: The ADJUST-PE Study. JESUS 2014;311:4785-0701.; SHARRI Garcia et al. Diagnostic accuracy of conventional or age adjusted D-dimer cutoff values in older  patients with suspected venous thromboembolism. Systemic review and meta-analysis. BMJ 2013:346:f2492.   CBC with platelets and differential    Narrative    The following orders were created for panel order CBC with platelets and differential.  Procedure                               Abnormality         Status                     ---------                               -----------         ------                     CBC with platelets and d...[[id number]]  Abnormal            Final result                 Please view results for these tests on the individual orders.   CBC with platelets and differential   Result Value Ref Range    WBC Count 9.5 4.0 - 11.0 10e3/uL    RBC Count 5.47 4.40 - 5.90 10e6/uL    Hemoglobin 15.2 13.3 - 17.7 g/dL    Hematocrit 45.0 40.0 - 53.0 %    MCV 82 78 - 100 fL    MCH 27.8 26.5 - 33.0 pg    MCHC 33.8 31.5 - 36.5 g/dL    RDW 15.3 (H) 10.0 - 15.0 %    Platelet Count 237 150 - 450 10e3/uL    % Neutrophils 63 %    % Lymphocytes 27 %    % Monocytes 6 %    % Eosinophils 3 %    % Basophils 0 %    % Immature Granulocytes 1 %    Absolute Neutrophils 6.0 1.6 - 8.3 10e3/uL    Absolute Lymphocytes 2.6 0.8 - 5.3 10e3/uL    Absolute Monocytes 0.6 0.0 - 1.3 10e3/uL    Absolute Eosinophils 0.3 0.0 - 0.7 10e3/uL    Absolute Basophils 0.0 0.0 - 0.2 10e3/uL    Absolute Immature Granulocytes 0.1 <=0.4 10e3/uL   XR Chest 2 Views    Narrative    EXAM: XR CHEST 2 VIEWS  LOCATION: [facility]  DATE: 4/11/2024    INDICATION:  Shortness of breath, Bilateral leg edema  COMPARISON: None.      Impression    IMPRESSION: Cardiac silhouette is within normal limits. No pulmonary edema. No focal airspace consolidation. No pleural effusion or pneumothorax.   Basic metabolic panel  (Ca, Cl, CO2, Creat, Gluc, K, Na, BUN)   Result Value Ref Range    Sodium 142 135 - 145 mmol/L    Potassium 4.2 3.4 - 5.3 mmol/L    Chloride 111 (H) 94 - 109 mmol/L    Carbon Dioxide (CO2) 27 20 - 32 mmol/L    Anion Gap 4 3  - 14 mmol/L    Urea Nitrogen 11 7 - 30 mg/dL    Creatinine 0.70 0.66 - 1.25 mg/dL    GFR Estimate >90 >60 mL/min/1.73m2    Calcium 9.3 8.5 - 10.1 mg/dL    Glucose 140 (H) 70 - 99 mg/dL

## 2024-04-11 NOTE — PROGRESS NOTES
Assessment & Plan     Dysuria  - UA Macroscopic with reflex to Microscopic and Culture - Clinic Collect  - UA Microscopic with Reflex to Culture    Shortness of breath    WATERMAN (dyspnea on exertion)    Weight gain    Elevated BP without diagnosis of hypertension     Patient Instructions     Results for orders placed or performed in visit on 04/11/24   UA Macroscopic with reflex to Microscopic and Culture - Clinic Collect     Status: Abnormal    Specimen: Urine, Clean Catch   Result Value Ref Range    Color Urine Yellow Colorless, Straw, Light Yellow, Yellow    Appearance Urine Clear Clear    Glucose Urine Negative Negative mg/dL    Bilirubin Urine Negative Negative    Ketones Urine Negative Negative mg/dL    Specific Gravity Urine 1.010 1.003 - 1.035    Blood Urine Negative Negative    pH Urine 5.5 5.0 - 7.0    Protein Albumin Urine Negative Negative mg/dL    Urobilinogen Urine 0.2 0.2, 1.0 E.U./dL    Nitrite Urine Negative Negative    Leukocyte Esterase Urine Trace (A) Negative   UA Microscopic with Reflex to Culture     Status: Abnormal   Result Value Ref Range    Bacteria Urine Few (A) None Seen /HPF    RBC Urine 0-2 0-2 /HPF /HPF    WBC Urine 0-5 0-5 /HPF /HPF    Squamous Epithelials Urine Few (A) None Seen /LPF    Narrative    Urine Culture not indicated     UA negative  Push fluids  UC not indicated    Normal physical exam, lungs clear.    Encouraged weight loss  Try to reduce sodium in the diet.    BP uncontrolled.      Will monitor at home  recommend follow up with PCP if remains elevated.      Return in about 1 week (around 4/18/2024) for with regular provider if symptoms persist.    ISABEL Boateng [facility] JENNIFER Fu is a 63 year old male who presents to clinic today for the following health issues:  Chief Complaint   Patient presents with    Urgent Care    Shortness of Breath     Pt reports sob x 3-4 weeks. 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  Course of illness is same.    Severity moderate  Current and Associated symptoms: shortness of breath  Treatment measures tried include None tried.  Predisposing factors include None.  Respirations audibly loud.    Sats normal.   Weight up 30# in 1 year.    Endorses WATERMAN with walking on incline.     Admits to poor diet.    BP elevated  Denies chest pain, WATERMAN, SOB, dizziness or lightheadedness.    No pain radiating to left arm or jaw.  No reflux.        UTI    Onset of symptoms was 2day(s).  Course of illness is same  Severity mild  Current and associated symptoms dysuria, frequency, and urgency  Treatment and measures tried Uristat (Pyridium)  Predisposing factors include history of frequent UTI's  Patient denies rigors, flank pain, and temperature > 101 degrees F.    Finished antibiotics for UTI 2-3 weeks ago.         Review of Systems  Constitutional, HEENT, cardiovascular, pulmonary, GI, , musculoskeletal, neuro, skin, endocrine and psych systems are negative, except as otherwise noted.      Objective    BP (!) 145/82 (BP Location: Left arm, Patient Position: Sitting, Cuff Size: Adult Large)   Pulse 66   Temp 97.5  F (36.4  C) (Tympanic)   Resp 20   Wt 110.7 kg (244 lb)   SpO2 98%   BMI 33.09 kg/m    Physical Exam   GENERAL: alert and no distress  EYES: Eyes grossly normal to inspection, PERRL and conjunctivae and sclerae normal  HENT: ear canals and TM's normal, nose and mouth without ulcers or lesions  NECK: no adenopathy, no asymmetry, masses, or scars  RESP: lungs clear to auscultation - no rales, rhonchi or wheezes  CV: regular rate and rhythm, normal S1 S2, no S3 or S4, no murmur, click or rub, no peripheral edema  MS: no gross musculoskeletal defects noted, no edema  BACK: no CVA tenderness, no paralumbar tenderness

## 2024-07-10 ENCOUNTER — OFFICE VISIT (OUTPATIENT)
Dept: URGENT CARE | Facility: URGENT CARE | Age: 64
End: 2024-07-10
Payer: COMMERCIAL

## 2024-07-10 VITALS
OXYGEN SATURATION: 97 % | WEIGHT: 243 LBS | BODY MASS INDEX: 32.96 KG/M2 | HEART RATE: 70 BPM | SYSTOLIC BLOOD PRESSURE: 140 MMHG | TEMPERATURE: 97.8 F | DIASTOLIC BLOOD PRESSURE: 82 MMHG

## 2024-07-10 DIAGNOSIS — R39.9 URINARY SYMPTOM OR SIGN: ICD-10-CM

## 2024-07-10 DIAGNOSIS — N30.01 ACUTE CYSTITIS WITH HEMATURIA: Primary | ICD-10-CM

## 2024-07-10 LAB
ALBUMIN UR-MCNC: ABNORMAL MG/DL
APPEARANCE UR: ABNORMAL
BACTERIA #/AREA URNS HPF: ABNORMAL /HPF
BILIRUB UR QL STRIP: NEGATIVE
COLOR UR AUTO: YELLOW
GLUCOSE UR STRIP-MCNC: NEGATIVE MG/DL
HGB UR QL STRIP: ABNORMAL
KETONES UR STRIP-MCNC: NEGATIVE MG/DL
LEUKOCYTE ESTERASE UR QL STRIP: ABNORMAL
NITRATE UR QL: NEGATIVE
PH UR STRIP: 6 [PH] (ref 5–7)
RBC #/AREA URNS AUTO: ABNORMAL /HPF
SP GR UR STRIP: 1.02 (ref 1–1.03)
UROBILINOGEN UR STRIP-ACNC: 1 E.U./DL
WBC #/AREA URNS AUTO: ABNORMAL /HPF
WBC CLUMPS #/AREA URNS HPF: PRESENT /HPF

## 2024-07-10 PROCEDURE — 99213 OFFICE O/P EST LOW 20 MIN: CPT | Performed by: NURSE PRACTITIONER

## 2024-07-10 PROCEDURE — 87086 URINE CULTURE/COLONY COUNT: CPT | Performed by: NURSE PRACTITIONER

## 2024-07-10 PROCEDURE — 87186 SC STD MICRODIL/AGAR DIL: CPT | Performed by: NURSE PRACTITIONER

## 2024-07-10 PROCEDURE — 87088 URINE BACTERIA CULTURE: CPT | Performed by: NURSE PRACTITIONER

## 2024-07-10 PROCEDURE — 81001 URINALYSIS AUTO W/SCOPE: CPT | Performed by: NURSE PRACTITIONER

## 2024-07-10 RX ORDER — NITROFURANTOIN 25; 75 MG/1; MG/1
100 CAPSULE ORAL 2 TIMES DAILY
Qty: 14 CAPSULE | Refills: 0 | Status: SHIPPED | OUTPATIENT
Start: 2024-07-10 | End: 2024-07-17

## 2024-07-10 NOTE — PROGRESS NOTES
Chief Complaint   Patient presents with    Urgent Care     Burning with urination, frequency x 2 weeks          ICD-10-CM    1. Acute cystitis with hematuria  N30.01 nitroFURantoin macrocrystal-monohydrate (MACROBID) 100 MG capsule      2. Urinary symptom or sign  R39.9 UA Macroscopic with reflex to Microscopic and Culture - Clinic Collect     Urine Microscopic Exam     Urine Culture        Increase fluid intake, nitrofurantoin, recheck in 7 days if any symptoms remain.    Red flag warning signs and when to go to the emergency room discussed.  Reviewed potential adverse reactions to medications.        Results for orders placed or performed in visit on 07/10/24 (from the past 24 hour(s))   UA Macroscopic with reflex to Microscopic and Culture - Clinic Collect    Specimen: Urine, Clean Catch   Result Value Ref Range    Color Urine Yellow Colorless, Straw, Light Yellow, Yellow    Appearance Urine Cloudy (A) Clear    Glucose Urine Negative Negative mg/dL    Bilirubin Urine Negative Negative    Ketones Urine Negative Negative mg/dL    Specific Gravity Urine 1.020 1.003 - 1.035    Blood Urine Moderate (A) Negative    pH Urine 6.0 5.0 - 7.0    Protein Albumin Urine Trace (A) Negative mg/dL    Urobilinogen Urine 1.0 0.2, 1.0 E.U./dL    Nitrite Urine Negative Negative    Leukocyte Esterase Urine Moderate (A) Negative   Urine Microscopic Exam   Result Value Ref Range    Bacteria Urine Few (A) None Seen /HPF    RBC Urine 2-5 (A) 0-2 /HPF /HPF    WBC Urine 25-50 (A) 0-5 /HPF /HPF    WBC Clumps Urine Present (A) None Seen /HPF       Subjective     Darrell Regalado is an 63 year old male who presents to clinic today for dysuria for 2 weeks.  Denies fever, chills, nausea vomiting or low back pain.  He has had UTIs in the past but not for a couple of years.  He denies any penile discharge or concerns about STDs.      Objective    BP (!) 140/82   Pulse 70   Temp 97.8  F (36.6  C) (Tympanic)   Wt 110.2 kg (243 lb)   SpO2 97%    BMI 32.96 kg/m    Nurses notes and VS have been reviewed.    Physical Exam       GENERAL APPEARANCE: healthy appearing, alert     ABDOMEN:  soft, nontender, no HSM or masses and bowel sounds normal, no CVA tenderness     MS: extremities normal- no gross deformities noted; normal muscle tone.     SKIN: no suspicious lesions or rashes    ISABEL Gabriel, CNP  [facility] Provider    The use of Dragon/Mediastay dictation services may have been used to construct the content in this note; any grammatical or spelling errors are non-intentional. Please contact the author of this note directly if you are in need of any clarification.

## 2024-07-12 LAB — BACTERIA UR CULT: ABNORMAL

## 2024-07-23 DIAGNOSIS — E78.5 HYPERLIPIDEMIA LDL GOAL <100: ICD-10-CM

## 2024-07-23 RX ORDER — ROSUVASTATIN CALCIUM 20 MG/1
20 TABLET, COATED ORAL DAILY
Qty: 90 TABLET | Refills: 3 | Status: SHIPPED | OUTPATIENT
Start: 2024-07-23

## 2024-08-03 DIAGNOSIS — Z87.440 PERSONAL HISTORY OF URINARY TRACT INFECTION: ICD-10-CM

## 2024-08-03 DIAGNOSIS — R32 URINARY INCONTINENCE, UNSPECIFIED TYPE: ICD-10-CM

## 2024-08-05 RX ORDER — TAMSULOSIN HYDROCHLORIDE 0.4 MG/1
0.8 CAPSULE ORAL DAILY
Qty: 90 CAPSULE | Refills: 3 | Status: SHIPPED | OUTPATIENT
Start: 2024-08-05 | End: 2024-08-06

## 2024-08-06 DIAGNOSIS — Z87.440 PERSONAL HISTORY OF URINARY TRACT INFECTION: ICD-10-CM

## 2024-08-06 DIAGNOSIS — R32 URINARY INCONTINENCE, UNSPECIFIED TYPE: ICD-10-CM

## 2024-08-06 RX ORDER — TAMSULOSIN HYDROCHLORIDE 0.4 MG/1
0.8 CAPSULE ORAL DAILY
Qty: 180 CAPSULE | Refills: 3 | Status: SHIPPED | OUTPATIENT
Start: 2024-08-06 | End: 2024-08-28

## 2024-08-08 ENCOUNTER — VIRTUAL VISIT (OUTPATIENT)
Dept: UROLOGY | Facility: CLINIC | Age: 64
End: 2024-08-08
Payer: COMMERCIAL

## 2024-08-08 DIAGNOSIS — N39.0 RECURRENT UTI: Primary | ICD-10-CM

## 2024-08-08 PROCEDURE — 99213 OFFICE O/P EST LOW 20 MIN: CPT | Mod: 95 | Performed by: STUDENT IN AN ORGANIZED HEALTH CARE EDUCATION/TRAINING PROGRAM

## 2024-08-08 NOTE — LETTER
8/8/2024       RE: Darrell Regalado  [address]  [facility] [address]     Dear Colleague,    Thank you for referring your patient, Darrell Regalado, to the [facility]OLOG[facility] [place] [facility]. Please see a copy of my visit note below.    CHIEF COMPLAINT   Darrell Regalado who is a 64 year old male returns today for follow-up of BPH with LUTS, recurrent UTI    HPI   Darrell Regalado is a  64 year old male returns today for follow-up of BPH with LUTS, recurrent UTI    Now he has had yet another UTI (enterococcus this time), note prior UTI in January extending back into October 2023 was Stap epi    He was treated with a 1 week course of nitrofurantoin and feels better now      PHYSICAL EXAM  Patient is a 64 year old  male   Vitals: There were no vitals taken for this visit.  There is no height or weight on file to calculate BMI.  General Appearance Adult:   Alert, no acute distress, oriented  HENT: throat/mouth:normal, good dentition  Lungs: no respiratory distress, or pursed lip breathing  Heart: No obvious jugular venous distension present  Musculoskeltal: extremities normal, no peripheral edema  Skin: no suspicious lesions or rashes  Neuro: Alert, oriented, speech and mentation normal  Psych: affect and mood normal    Collected 7/10/2024 10:12 AM       Status: Final result       Visible to patient: Yes (seen)       Dx: Urinary symptom or sign    Specimen Information: Urine, Clean Catch   0 Result Notes  Culture >100,000 CFU/mL Enterococcus faecalis Abnormal            Resulting Agency: [facility]     Susceptibility     Enterococcus faecalis     SAI     Ampicillin <=2 ug/mL Susceptible     Nitrofurantoin <=16 ug/mL Susceptible     Vancomycin <=0.5 ug/mL Susceptible                      ASSESSMENT and PLAN  64 year old male returns today for follow-up of BPH with LUTS, recurrent UTI    Now had an Enterococcus  UTI about 6 months after clearing Staph epi  UTI. This would appear to be a new infection as opposed to persistent infection. Discussed options including continuing as needed treatment of infections as they arise, daily suppressive medication, or bladder outlet procedure (specifically discussed Greenlight laser photovaporization of the prostate)    For Greenlight discussed expected overnight day post procedure catheterization. Potential risks include but are not limited to painful urination (dysuria), blood in the urine (hematuria), blood in the semen (hematospermia), decrease in ejaculatory volume, urinary tract infection (UTI), and urinary frequency, retention or urgency; also potential damage to bladder or ureters with the laser    Patient wants to caroline his options and get back to us        Froylan Acuna MD   [facility] Urology  [facility] Phone: [phone]        Virtual Visit Details    Type of service:  Video Visit     Originating Location (pt. Location): Home    Distant Location (provider location):  On-site  Platform used for Video Visit: [facility]      Again, thank you for allowing me to participate in the care of your patient.      Sincerely,    Froylan Acuna MD

## 2024-08-08 NOTE — NURSING NOTE
Current patient location:  work    Is the patient currently in the state [facility]? YES    Visit mode:VIDEO    If the visit is dropped, the patient can be reconnected by: VIDEO VISIT: Text to cell phone:   Telephone Information:   Mobile [phone]       Will anyone else be joining the visit? NO  (If patient encounters technical issues they should call [phone]    How would you like to obtain your AVS? MyChart    Are changes needed to the allergy or medication list? No    Are refills needed on medications prescribed by this physician? NO    Rooming Documentation:  Not applicable      Reason for visit: GOPI CAVAZOS

## 2024-08-08 NOTE — PROGRESS NOTES
CHIEF COMPLAINT   Darrell Regalado who is a 64 year old male returns today for follow-up of BPH with LUTS, recurrent UTI    HPI   Darrell Regalado is a  64 year old male returns today for follow-up of BPH with LUTS, recurrent UTI    Now he has had yet another UTI (enterococcus this time), note prior UTI in January extending back into October 2023 was Stap epi    He was treated with a 1 week course of nitrofurantoin and feels better now      PHYSICAL EXAM  Patient is a 64 year old  male   Vitals: There were no vitals taken for this visit.  There is no height or weight on file to calculate BMI.  General Appearance Adult:   Alert, no acute distress, oriented  HENT: throat/mouth:normal, good dentition  Lungs: no respiratory distress, or pursed lip breathing  Heart: No obvious jugular venous distension present  Musculoskeltal: extremities normal, no peripheral edema  Skin: no suspicious lesions or rashes  Neuro: Alert, oriented, speech and mentation normal  Psych: affect and mood normal    Collected 7/10/2024 10:12 AM       Status: Final result       Visible to patient: Yes (seen)       Dx: Urinary symptom or sign    Specimen Information: Urine, Clean Catch   0 Result Notes  Culture >100,000 CFU/mL Enterococcus faecalis Abnormal            Resulting Agency: [facility]     Susceptibility     Enterococcus faecalis     SAI     Ampicillin <=2 ug/mL Susceptible     Nitrofurantoin <=16 ug/mL Susceptible     Vancomycin <=0.5 ug/mL Susceptible                      ASSESSMENT and PLAN  64 year old male returns today for follow-up of BPH with LUTS, recurrent UTI    Now had an Enterococcus  UTI about 6 months after clearing Staph epi UTI. This would appear to be a new infection as opposed to persistent infection. Discussed options including continuing as needed treatment of infections as they arise, daily suppressive medication, or bladder outlet procedure (specifically discussed Greenlight laser photovaporization of the prostate)    For  Nicole discussed expected overnight day post procedure catheterization. Potential risks include but are not limited to painful urination (dysuria), blood in the urine (hematuria), blood in the semen (hematospermia), decrease in ejaculatory volume, urinary tract infection (UTI), and urinary frequency, retention or urgency; also potential damage to bladder or ureters with the laser    Patient wants to caroline his options and get back to us        Froylan Acuna MD   [facility] Urology  [facility] Phone: [phone]        Virtual Visit Details    Type of service:  Video Visit     Originating Location (pt. Location): Home    Distant Location (provider location):  On-site  Platform used for Video Visit: Deanne

## 2024-08-26 SDOH — HEALTH STABILITY: PHYSICAL HEALTH: ON AVERAGE, HOW MANY MINUTES DO YOU ENGAGE IN EXERCISE AT THIS LEVEL?: 0 MIN

## 2024-08-26 SDOH — HEALTH STABILITY: PHYSICAL HEALTH: ON AVERAGE, HOW MANY DAYS PER WEEK DO YOU ENGAGE IN MODERATE TO STRENUOUS EXERCISE (LIKE A BRISK WALK)?: 0 DAYS

## 2024-08-26 ASSESSMENT — SOCIAL DETERMINANTS OF HEALTH (SDOH): HOW OFTEN DO YOU GET TOGETHER WITH FRIENDS OR RELATIVES?: MORE THAN THREE TIMES A WEEK

## 2024-08-28 ENCOUNTER — OFFICE VISIT (OUTPATIENT)
Dept: FAMILY MEDICINE | Facility: CLINIC | Age: 64
End: 2024-08-28
Attending: INTERNAL MEDICINE
Payer: COMMERCIAL

## 2024-08-28 ENCOUNTER — TELEPHONE (OUTPATIENT)
Dept: FAMILY MEDICINE | Facility: CLINIC | Age: 64
End: 2024-08-28

## 2024-08-28 VITALS
WEIGHT: 235.2 LBS | OXYGEN SATURATION: 98 % | HEART RATE: 48 BPM | SYSTOLIC BLOOD PRESSURE: 114 MMHG | TEMPERATURE: 97.2 F | BODY MASS INDEX: 31.86 KG/M2 | HEIGHT: 72 IN | RESPIRATION RATE: 20 BRPM | DIASTOLIC BLOOD PRESSURE: 65 MMHG

## 2024-08-28 DIAGNOSIS — Z00.00 ROUTINE GENERAL MEDICAL EXAMINATION AT A HEALTH CARE FACILITY: Primary | ICD-10-CM

## 2024-08-28 DIAGNOSIS — E78.5 HYPERLIPIDEMIA LDL GOAL <100: ICD-10-CM

## 2024-08-28 DIAGNOSIS — B00.1 RECURRENT COLD SORES: ICD-10-CM

## 2024-08-28 DIAGNOSIS — N52.9 MALE ERECTILE DISORDER: ICD-10-CM

## 2024-08-28 DIAGNOSIS — R35.1 BENIGN PROSTATIC HYPERPLASIA WITH NOCTURIA: ICD-10-CM

## 2024-08-28 DIAGNOSIS — Z12.5 SCREENING PSA (PROSTATE SPECIFIC ANTIGEN): ICD-10-CM

## 2024-08-28 DIAGNOSIS — Z86.0100 HISTORY OF COLONIC POLYPS: ICD-10-CM

## 2024-08-28 DIAGNOSIS — E66.09 CLASS 1 OBESITY DUE TO EXCESS CALORIES WITH SERIOUS COMORBIDITY AND BODY MASS INDEX (BMI) OF 31.0 TO 31.9 IN ADULT: ICD-10-CM

## 2024-08-28 DIAGNOSIS — Z13.6 ENCOUNTER FOR ABDOMINAL AORTIC ANEURYSM (AAA) SCREENING: ICD-10-CM

## 2024-08-28 DIAGNOSIS — G47.33 OSA (OBSTRUCTIVE SLEEP APNEA): ICD-10-CM

## 2024-08-28 DIAGNOSIS — E66.811 CLASS 1 OBESITY DUE TO EXCESS CALORIES WITH SERIOUS COMORBIDITY AND BODY MASS INDEX (BMI) OF 31.0 TO 31.9 IN ADULT: ICD-10-CM

## 2024-08-28 DIAGNOSIS — N40.1 BENIGN PROSTATIC HYPERPLASIA WITH NOCTURIA: ICD-10-CM

## 2024-08-28 LAB
ALBUMIN SERPL BCG-MCNC: 4.6 G/DL (ref 3.5–5.2)
ALP SERPL-CCNC: 98 U/L (ref 40–150)
ALT SERPL W P-5'-P-CCNC: 36 U/L (ref 0–70)
ANION GAP SERPL CALCULATED.3IONS-SCNC: 10 MMOL/L (ref 7–15)
AST SERPL W P-5'-P-CCNC: 23 U/L (ref 0–45)
BILIRUB SERPL-MCNC: 1.3 MG/DL
BUN SERPL-MCNC: 14 MG/DL (ref 8–23)
CALCIUM SERPL-MCNC: 9.3 MG/DL (ref 8.8–10.4)
CHLORIDE SERPL-SCNC: 106 MMOL/L (ref 98–107)
CHOLEST SERPL-MCNC: 134 MG/DL
CREAT SERPL-MCNC: 0.98 MG/DL (ref 0.67–1.17)
EGFRCR SERPLBLD CKD-EPI 2021: 86 ML/MIN/1.73M2
ERYTHROCYTE [DISTWIDTH] IN BLOOD BY AUTOMATED COUNT: 12.5 % (ref 10–15)
FASTING STATUS PATIENT QL REPORTED: YES
FASTING STATUS PATIENT QL REPORTED: YES
GLUCOSE SERPL-MCNC: 108 MG/DL (ref 70–99)
HBA1C MFR BLD: 5.7 % (ref 0–5.6)
HCO3 SERPL-SCNC: 25 MMOL/L (ref 22–29)
HCT VFR BLD AUTO: 42.9 % (ref 40–53)
HDLC SERPL-MCNC: 41 MG/DL
HGB BLD-MCNC: 13.9 G/DL (ref 13.3–17.7)
LDLC SERPL CALC-MCNC: 78 MG/DL
MCH RBC QN AUTO: 27.9 PG (ref 26.5–33)
MCHC RBC AUTO-ENTMCNC: 32.4 G/DL (ref 31.5–36.5)
MCV RBC AUTO: 86 FL (ref 78–100)
NONHDLC SERPL-MCNC: 93 MG/DL
PLATELET # BLD AUTO: 246 10E3/UL (ref 150–450)
POTASSIUM SERPL-SCNC: 4.9 MMOL/L (ref 3.4–5.3)
PROT SERPL-MCNC: 7 G/DL (ref 6.4–8.3)
PSA SERPL DL<=0.01 NG/ML-MCNC: 1.21 NG/ML (ref 0–4.5)
RBC # BLD AUTO: 4.98 10E6/UL (ref 4.4–5.9)
SODIUM SERPL-SCNC: 141 MMOL/L (ref 135–145)
TRIGL SERPL-MCNC: 77 MG/DL
WBC # BLD AUTO: 6.5 10E3/UL (ref 4–11)

## 2024-08-28 PROCEDURE — 85027 COMPLETE CBC AUTOMATED: CPT | Performed by: INTERNAL MEDICINE

## 2024-08-28 PROCEDURE — 90471 IMMUNIZATION ADMIN: CPT | Performed by: INTERNAL MEDICINE

## 2024-08-28 PROCEDURE — 80061 LIPID PANEL: CPT | Performed by: INTERNAL MEDICINE

## 2024-08-28 PROCEDURE — 99397 PER PM REEVAL EST PAT 65+ YR: CPT | Mod: 25 | Performed by: INTERNAL MEDICINE

## 2024-08-28 PROCEDURE — 83036 HEMOGLOBIN GLYCOSYLATED A1C: CPT | Performed by: INTERNAL MEDICINE

## 2024-08-28 PROCEDURE — 80053 COMPREHEN METABOLIC PANEL: CPT | Performed by: INTERNAL MEDICINE

## 2024-08-28 PROCEDURE — 90677 PCV20 VACCINE IM: CPT | Performed by: INTERNAL MEDICINE

## 2024-08-28 PROCEDURE — 99214 OFFICE O/P EST MOD 30 MIN: CPT | Mod: 25 | Performed by: INTERNAL MEDICINE

## 2024-08-28 PROCEDURE — G0103 PSA SCREENING: HCPCS | Performed by: INTERNAL MEDICINE

## 2024-08-28 PROCEDURE — 36415 COLL VENOUS BLD VENIPUNCTURE: CPT | Performed by: INTERNAL MEDICINE

## 2024-08-28 RX ORDER — TAMSULOSIN HYDROCHLORIDE 0.4 MG/1
0.8 CAPSULE ORAL DAILY
Qty: 180 CAPSULE | Refills: 3 | Status: SHIPPED | OUTPATIENT
Start: 2024-08-28

## 2024-08-28 SDOH — HEALTH STABILITY: PHYSICAL HEALTH: ON AVERAGE, HOW MANY MINUTES DO YOU ENGAGE IN EXERCISE AT THIS LEVEL?: 0 MIN

## 2024-08-28 SDOH — HEALTH STABILITY: PHYSICAL HEALTH: ON AVERAGE, HOW MANY DAYS PER WEEK DO YOU ENGAGE IN MODERATE TO STRENUOUS EXERCISE (LIKE A BRISK WALK)?: 0 DAYS

## 2024-08-28 ASSESSMENT — SOCIAL DETERMINANTS OF HEALTH (SDOH): HOW OFTEN DO YOU GET TOGETHER WITH FRIENDS OR RELATIVES?: MORE THAN THREE TIMES A WEEK

## 2024-08-28 ASSESSMENT — PAIN SCALES - GENERAL: PAINLEVEL: NO PAIN (0)

## 2024-08-28 NOTE — TELEPHONE ENCOUNTER
Retail Pharmacy Prior Authorization Team   Phone: [phone]    PRIOR AUTHORIZATION DENIED    Medication: ZEPBOUND 2.5 MG/0.5ML SC SOAJ  Insurance Company: [employer] [place] - Phone [phone] Fax [phone]  Denial Date: 8/28/2024  Denial Reason(s): WEIGHT LOSS DRUGS ARE EXCLUDED FROM COVERAGE      Appeal Information: IF THE PROVIDER WOULD LIKE TO APPEAL THIS DECISION PLEASE PROVIDE THE PA TEAM WITH A LETTER OF MEDICAL NECESSITY      Patient Notified: NO

## 2024-08-28 NOTE — NURSING NOTE
Prior to immunization administration, verified patients identity using patient s name and date of birth. Please see Immunization Activity for additional information.     Screening Questionnaire for Adult Immunization    Are you sick today?   No   Do you have allergies to medications, food, a vaccine component or latex?   No   Have you ever had a serious reaction after receiving a vaccination?   No   Do you have a long-term health problem with heart, lung, kidney, or metabolic disease (e.g., diabetes), asthma, a blood disorder, no spleen, complement component deficiency, a cochlear implant, or a spinal fluid leak?  Are you on long-term aspirin therapy?   No   Do you have cancer, leukemia, HIV/AIDS, or any other immune system problem?   No   Do you have a parent, brother, or sister with an immune system problem?   No   In the past 3 months, have you taken medications that affect  your immune system, such as prednisone, other steroids, or anticancer drugs; drugs for the treatment of rheumatoid arthritis, Crohn s disease, or psoriasis; or have you had radiation treatments?   No   Have you had a seizure, or a brain or other nervous system problem?   No   During the past year, have you received a transfusion of blood or blood    products, or been given immune (gamma) globulin or antiviral drug?   No   For women: Are you pregnant or is there a chance you could become       pregnant during the next month?   No   Have you received any vaccinations in the past 4 weeks?   No     Immunization questionnaire answers were all negative.    Patient is receiving PCV 20.      Patient instructed to remain in clinic for 15 minutes afterwards, and to report any adverse reactions.     Screening performed by Amrit De Los Santos MA on 8/28/2024 at 12:20 PM.

## 2024-08-28 NOTE — PATIENT INSTRUCTIONS
Patient Education   Preventive Care Advice   This is general advice given by our system to help you stay healthy. However, your care team may have specific advice just for you. Please talk to your care team about your preventive care needs.  Nutrition  Eat 5 or more servings of fruits and vegetables each day.  Try wheat bread, brown rice and whole grain pasta (instead of white bread, rice, and pasta).  Get enough calcium and vitamin D. Check the label on foods and aim for 100% of the RDA (recommended daily allowance).  Lifestyle  Exercise at least 150 minutes each week  (30 minutes a day, 5 days a week).  Do muscle strengthening activities 2 days a week. These help control your weight and prevent disease.  No smoking.  Wear sunscreen to prevent skin cancer.  Have a dental exam and cleaning every 6 months.  Yearly exams  See your health care team every year to talk about:  Any changes in your health.  Any medicines your care team has prescribed.  Preventive care, family planning, and ways to prevent chronic diseases.  Shots (vaccines)   HPV shots (up to age 26), if you've never had them before.  Hepatitis B shots (up to age 59), if you've never had them before.  COVID-19 shot: Get this shot when it's due.  Flu shot: Get a flu shot every year.  Tetanus shot: Get a tetanus shot every 10 years.  Pneumococcal, hepatitis A, and RSV shots: Ask your care team if you need these based on your risk.  Shingles shot (for age 50 and up)  General health tests  Diabetes screening:  Starting at age 35, Get screened for diabetes at least every 3 years.  If you are younger than age 35, ask your care team if you should be screened for diabetes.  Cholesterol test: At age 39, start having a cholesterol test every 5 years, or more often if advised.  Bone density scan (DEXA): At age 50, ask your care team if you should have this scan for osteoporosis (brittle bones).  Hepatitis C: Get tested at least once in your life.  STIs (sexually  transmitted infections)  Before age 24: Ask your care team if you should be screened for STIs.  After age 24: Get screened for STIs if you're at risk. You are at risk for STIs (including HIV) if:  You are sexually active with more than one person.  You don't use condoms every time.  You or a partner was diagnosed with a sexually transmitted infection.  If you are at risk for HIV, ask about PrEP medicine to prevent HIV.  Get tested for HIV at least once in your life, whether you are at risk for HIV or not.  Cancer screening tests  Cervical cancer screening: If you have a cervix, begin getting regular cervical cancer screening tests starting at age 21.  Breast cancer scan (mammogram): If you've ever had breasts, begin having regular mammograms starting at age 40. This is a scan to check for breast cancer.  Colon cancer screening: It is important to start screening for colon cancer at age 45.  Have a colonoscopy test every 10 years (or more often if you're at risk) Or, ask your provider about stool tests like a FIT test every year or Cologuard test every 3 years.  To learn more about your testing options, visit:   .  For help making a decision, visit:   https://bit.ly/xo37258.  Prostate cancer screening test: If you have a prostate, ask your care team if a prostate cancer screening test (PSA) at age 55 is right for you.  Lung cancer screening: If you are a current or former smoker ages 50 to 80, ask your care team if ongoing lung cancer screenings are right for you.  For informational purposes only. Not to replace the advice of your health care provider. Copyright   2023 [facility] [employer]. All rights reserved. Clinically reviewed by the [facility] Transitions Program. VCNC 830446 - REV 01/24.  Bladder Training: Care Instructions  Your Care Instructions     Bladder training is used to treat urge incontinence and stress incontinence. Urge incontinence means that the need to urinate comes on so fast  that you can't get to a toilet in time. Stress incontinence means that you leak urine because of pressure on your bladder. For example, it may happen when you laugh, cough, or lift something heavy.  Bladder training can increase how long you can wait before you have to urinate. It can also help your bladder hold more urine. And it can give you better control over the urge to urinate.  It is important to remember that bladder training takes a few weeks to a few months to make a difference. You may not see results right away, but don't give up.  Follow-up care is a key part of your treatment and safety. Be sure to make and go to all appointments, and call your doctor if you are having problems. It's also a good idea to know your test results and keep a list of the medicines you take.  How can you care for yourself at home?  Work with your doctor to come up with a bladder training program that is right for you. You may use one or more of the following methods.  Delayed urination  In the beginning, try to keep from urinating for 5 minutes after you first feel the need to go.  While you wait, take deep, slow breaths to relax. Kegel exercises can also help you delay the need to go to the bathroom.  After some practice, when you can easily wait 5 minutes to urinate, try to wait 10 minutes before you urinate.  Slowly increase the waiting period until you are able to control when you have to urinate.  Scheduled urination  Empty your bladder when you first wake up in the morning.  Schedule times throughout the day when you will urinate.  Start by going to the bathroom every hour, even if you don't need to go.  Slowly increase the time between trips to the bathroom.  When you have found a schedule that works well for you, keep doing it.  If you wake up during the night and have to urinate, do it. Apply your schedule to waking hours only.  Kegel exercises  These tighten and strengthen pelvic muscles, which can help you control  "the flow of urine. (If doing these exercises causes pain, stop doing them and talk with your doctor.) To do Kegel exercises:  Squeeze your muscles as if you were trying not to pass gas. Or squeeze your muscles as if you were stopping the flow of urine. Your belly, legs, and buttocks shouldn't move.  Hold the squeeze for 3 seconds, then relax for 5 to 10 seconds.  Start with 3 seconds, then add 1 second each week until you are able to squeeze for 10 seconds.  Repeat the exercise 10 times a session. Do 3 to 8 sessions a day.  When should you call for help?  Watch closely for changes in your health, and be sure to contact your doctor if:    Your incontinence is getting worse.     You do not get better as expected.   Where can you learn more?  Go to https://www.[employer].net/patiented  Enter V684 in the search box to learn more about \"Bladder Training: Care Instructions.\"  Current as of: November 15, 2023               Content Version: 14.0    1586-1046 [employer].   Care instructions adapted under license by your healthcare professional. If you have questions about a medical condition or this instruction, always ask your healthcare professional. [employer] disclaims any warranty or liability for your use of this information.         "

## 2024-08-28 NOTE — PROGRESS NOTES
Preventive Care Visit  [facility] [place]  Mike Mathur MD, Internal Medicine  Aug 28, 2024      Assessment & Plan     Routine general medical examination at a health care facility    - Comprehensive metabolic panel; Future  - CBC with platelets; Future  - HEMOGLOBIN A1C; Future  - Comprehensive metabolic panel  - CBC with platelets  - HEMOGLOBIN A1C    Class 1 obesity due to excess calories with serious comorbidity and body mass index (BMI) of 31.0 to 31.9 in adult  I had a very long discussion with him about changing his lifestyle to promote weight loss and to improve his metabolic health.  I believe that this would help him with his chronic back pain.  We talked about reducing calories and increasing physical activity and reducing sugar and high carbohydrate foods.  Despite this counseling, he is still interested in drug therapy to help him with his weight loss goals.  We discussed using GLP-1 receptor agonists.  We discusse potential financial and access barriers.  We discussed potential side effects including nausea and constipation.  He would like me to write a prescription because if insurance covers the medication, he would like to try it.  - tirzepatide-Weight Management (ZEPBOUND) 2.5 MG/0.5ML prefilled pen; Inject 0.5 mLs (2.5 mg) subcutaneously every 7 days.  - tirzepatide-Weight Management (ZEPBOUND) 5 MG/0.5ML prefilled pen; Inject 0.5 mLs (5 mg) subcutaneously every 7 days. Start only after completing 4 weeks on 2.5 mg per week dose.    Hyperlipidemia LDL goal <100  On statin  - Lipid panel reflex to direct LDL Non-fasting; Future  - Lipid panel reflex to direct LDL Non-fasting    Benign prostatic hyperplasia with nocturia  LUTS okay on double dose Flomax  - tamsulosin (FLOMAX) 0.4 MG capsule; Take 2 capsules (0.8 mg) by mouth daily.    RASHID (obstructive sleep apnea)      Male erectile disorder      Recurrent cold sores      History of colonic polyps  Reminded him that he is due for a  follow-up colonoscopy in February  - Colonoscopy Screening  Referral; Future    Encounter for abdominal aortic aneurysm (AAA) screening  Recommended this test for screening purposes  - [place] Aorta Medicare AAA Screening; Future    Screening PSA (prostate specific antigen)    - PSA, screen; Future  - PSA, screen    Patient has been advised of split billing requirements and indicates understanding: Yes        BMI  Estimated body mass index is 31.9 kg/m  as calculated from the following:    Height as of this encounter: 1.829 m (6').    Weight as of this encounter: 106.7 kg (235 lb 3.2 oz).   Weight management plan: Discussed healthy diet and exercise guidelines    Counseling  Appropriate preventive services were addressed with this patient via screening, questionnaire, or discussion as appropriate for fall prevention, nutrition, physical activity, Tobacco-use cessation, social engagement, weight loss and cognition.  Checklist reviewing preventive services available has been given to the patient.  Reviewed patient's diet, addressing concerns and/or questions.   Information on urinary incontinence and treatment options given to patient.       FUTURE APPOINTMENTS:       - Follow-up for annual visit or as needed    Coral Fu is a 64 year old, presenting for the following:  Physical (Patient is here for annual wellness visit.)        8/28/2024    11:11 AM   Additional Questions   Roomed by Amrit WATTS MA   Accompanied by Self        Health Care Directive  Patient does not have a Health Care Directive or Living Will:     HPI              8/28/2024   General Health   How would you rate your overall physical health? Good   Feel stress (tense, anxious, or unable to sleep) Not at all            8/28/2024   Nutrition   Diet: Regular (no restrictions)            8/28/2024   Exercise   Days per week of moderate/strenous exercise 0 days   Average minutes spent exercising at this level 0 min      (!) EXERCISE CONCERN       8/28/2024   Social Factors   Frequency of gathering with friends or relatives More than three times a week   Worry food won't last until get money to buy more No   Food not last or not have enough money for food? No   Do you have housing? (Housing is defined as stable permanent housing and does not include staying ouside in a car, in a tent, in an abandoned building, in an overnight shelter, or couch-surfing.) Yes   Are you worried about losing your housing? No   Lack of transportation? No   Unable to get utilities (heat,electricity)? No            8/28/2024   Fall Risk   Fallen 2 or more times in the past year? No   Trouble with walking or balance? No             8/28/2024   Activities of Daily Living- Home Safety   Needs help with the following daily activites None of the above   Safety concerns in the home None of the above            8/28/2024   Dental   Dentist two times every year? Yes            8/28/2024   Hearing Screening   Hearing concerns? None of the above            8/28/2024   Driving Risk Screening   Patient/family members have concerns about driving No            8/28/2024   General Alertness/Fatigue Screening   Have you been more tired than usual lately? No            8/28/2024   Urinary Incontinence Screening   Bothered by leaking urine in past 6 months Yes               Today's PHQ-2 Score:       8/28/2024    11:11 AM   PHQ-2 ( 1999 [employer])   Q1: Little interest or pleasure in doing things 0   Q2: Feeling down, depressed or hopeless 0   PHQ-2 Score 0   Q1: Little interest or pleasure in doing things Not at all   Q2: Feeling down, depressed or hopeless Not at all   PHQ-2 Score 0           8/28/2024   Substance Use   Alcohol more than 3/day or more than 7/wk Not Applicable   Do you have a current opioid prescription? No   How severe/bad is pain from 1 to 10? 5/10   Do you use any other substances recreationally? No        Social History     Tobacco Use    Smoking status: Never     Passive exposure:  Never    Smokeless tobacco: Never   Vaping Use    Vaping status: Never Used   Substance Use Topics    Alcohol use: No     Comment: sober since 2006    Drug use: No           8/28/2024   AAA Screening   Family history of Abdominal Aortic Aneurysm (AAA)? (!) YES       Last PSA:   PSA   Date Value Ref Range Status   12/23/2020 0.96 0 - 4 ug/L Final     Comment:     Assay Method:  Chemiluminescence using Siemens Vista analyzer     Prostate Specific Antigen Screen   Date Value Ref Range Status   06/28/2023 1.80 0.00 - 4.50 ng/mL Final   01/19/2022 3.87 0.00 - 4.00 ug/L Final     PSA Tumor Marker   Date Value Ref Range Status   02/16/2022 1.35 0.00 - 4.00 ug/L Final     ASCVD Risk   The 10-year ASCVD risk score (Elina TIPTON, et al., 2019) is: 9.4%    Values used to calculate the score:      Age: 64 years      Sex: Male      Is Non- : No      Diabetic: No      Tobacco smoker: No      Systolic Blood Pressure: 114 mmHg      Is BP treated: No      HDL Cholesterol: 42 mg/dL      Total Cholesterol: 148 mg/dL            Reviewed and updated as needed this visit by Provider                    Past Medical History:   Diagnosis Date    Alcohol abuse, unspecified     stopped drinking 2006    Other unspecified back disorder     low back pain    Sleep apnea      Past Surgical History:   Procedure Laterality Date    COLONOSCOPY N/A 02/02/2022    Procedure: COLONOSCOPY;  Surgeon: Cristela Boland MD;  Location:  GI    ORTHOPEDIC SURGERY      finger fracture repair     VASECTOMY      wisdom teeth extraction       BP Readings from Last 3 Encounters:   08/28/24 114/65   07/10/24 (!) 140/82   04/11/24 (!) 156/87    Wt Readings from Last 3 Encounters:   08/28/24 106.7 kg (235 lb 3.2 oz)   07/10/24 110.2 kg (243 lb)   04/11/24 111.1 kg (245 lb)                  Patient Active Problem List   Diagnosis    Lumbago    RASHID (obstructive sleep apnea)    Hyperlipidemia LDL goal <100    Male erectile disorder     Recurrent cold sores    Benign prostatic hyperplasia with nocturia     Past Surgical History:   Procedure Laterality Date    COLONOSCOPY N/A 02/02/2022    Procedure: COLONOSCOPY;  Surgeon: Cristela Boland MD;  Location:  GI    ORTHOPEDIC SURGERY      finger fracture repair     VASECTOMY      wisdom teeth extraction         Social History     Tobacco Use    Smoking status: Never     Passive exposure: Never    Smokeless tobacco: Never   Substance Use Topics    Alcohol use: No     Comment: sober since 2006     Family History   Problem Relation Age of Onset    Hypertension Mother     Breast Cancer Mother     Diabetes Father     Prostate Cancer Father     Cancer - colorectal No family hx of          Current Outpatient Medications   Medication Sig Dispense Refill    albuterol (PROAIR HFA/PROVENTIL HFA/VENTOLIN HFA) 108 (90 Base) MCG/ACT inhaler Inhale 2 puffs into the lungs every 6 hours as needed for shortness of breath, wheezing or cough 18 g 0    rosuvastatin (CRESTOR) 20 MG tablet Take 1 tablet (20 mg) by mouth daily 90 tablet 3    sildenafil (VIAGRA) 50 MG tablet TAKE 1/2 TABLET BY MOUTH AS NEEDED - TAKE 30 MINUTES TO 4 HOURS BEFORE SEX - DO NOT USE WITH NITROGLYCERIN,TERAZOSIN, OR DOXASIN 30 tablet 1    tamsulosin (FLOMAX) 0.4 MG capsule Take 2 capsules (0.8 mg) by mouth daily 180 capsule 3    tamsulosin (FLOMAX) 0.4 MG capsule Take 1 capsule (0.4 mg) by mouth daily 90 capsule 3    valACYclovir (VALTREX) 1000 mg tablet Take 2 tablets (2,000 mg) by mouth 2 times daily 4 tablet 5    gabapentin (NEURONTIN) 100 MG capsule Take 2 capsules (200 mg) by mouth 3 times daily 180 capsule 1     Current providers sharing in care for this patient include:  Patient Care Team:  Mike Mathur MD as PCP - General (Internal Medicine)  Mike Mathur MD as Assigned PCP  Evelyn Herndon PA-C as Physician Assistant (Urology)  Evelyn Herndon PA-C as Assigned OBGYN Provider  Froylan Acuna MD as Assigned Surgical  Provider    The following health maintenance items are reviewed in Epic and correct as of today:  Health Maintenance   Topic Date Due    RSV VACCINE (Pregnancy & 60+) (1 - 1-dose 60+ series) Never done    COVID-19 Vaccine (5 - 2023-24 season) 09/01/2023    LIPID  06/28/2024    ANNUAL REVIEW OF HM ORDERS  06/28/2024    MEDICARE ANNUAL WELLNESS VISIT  07/20/2024    Pneumococcal Vaccine: 65+ Years (1 of 1 - PCV) 07/20/2024    INFLUENZA VACCINE (1) 09/01/2024    ADVANCE CARE PLANNING  11/20/2024    COLORECTAL CANCER SCREENING  02/02/2025    FALL RISK ASSESSMENT  08/28/2025    GLUCOSE  04/11/2027    DTAP/TDAP/TD IMMUNIZATION (3 - Td or Tdap) 09/28/2027    HEPATITIS C SCREENING  Completed    HIV SCREENING  Completed    PHQ-2 (once per calendar year)  Completed    ZOSTER IMMUNIZATION  Completed    HPV IMMUNIZATION  Aged Out    MENINGITIS IMMUNIZATION  Aged Out    RSV MONOCLONAL ANTIBODY  Aged Out            Objective    Exam  /65 (BP Location: Left arm, Patient Position: Sitting, Cuff Size: Adult Large)   Pulse (!) 48   Temp 97.2  F (36.2  C) (Temporal)   Resp 20   Ht 1.829 m (6')   Wt 106.7 kg (235 lb 3.2 oz)   SpO2 98%   BMI 31.90 kg/m     Estimated body mass index is 31.9 kg/m  as calculated from the following:    Height as of this encounter: 1.829 m (6').    Weight as of this encounter: 106.7 kg (235 lb 3.2 oz).    Physical Exam  GENERAL: alert and no distress  EYES: Eyes grossly normal to inspection, PERRL and conjunctivae and sclerae normal  HENT: ear canals and TM's normal, nose and mouth without ulcers or lesions  NECK: no adenopathy, no asymmetry, masses, or scars  RESP: lungs clear to auscultation - no rales, rhonchi or wheezes  CV: regular rate and rhythm, normal S1 S2, no S3 or S4, no murmur, click or rub, no peripheral edema  ABDOMEN: soft, nontender, no hepatosplenomegaly, no masses and bowel sounds normal  RECTAL: normal sphincter tone, no rectal masses, prostate normal size, smooth, nontender  without nodules or masses  MS: no gross musculoskeletal defects noted, no edema  SKIN: no suspicious lesions or rashes  NEURO: Normal strength and tone, mentation intact and speech normal  PSYCH: mentation appears normal, affect normal/bright        8/28/2024   Mini Cog   Clock Draw Score 2 Normal   3 Item Recall 3 objects recalled   Mini Cog Total Score 5                Signed Electronically by: Mike Mathur MD

## 2024-08-29 ENCOUNTER — PATIENT OUTREACH (OUTPATIENT)
Dept: GASTROENTEROLOGY | Facility: CLINIC | Age: 64
End: 2024-08-29
Payer: COMMERCIAL

## 2024-08-29 NOTE — RESULT ENCOUNTER NOTE
The following letter pertains to your most recent diagnostic tests:    -Liver and gallbladder tests are normal for you. (ALT,AST, Alk phos, bilirubin), kidney function is normal for you (Creatinine, GFR), Sodium is normal, Potassium is normal for you, Calcium is normal for you, the Glucose (blood sugar) is elevated but it is not in the diabetic range (diabetes is defined as a fasting blood sugar reading greater than 126 on two separate occassions).      -Your cholesterol panel looks healthy.     -Your prostate specific antigen (PSA) test result returned normal.     -Your hemoglobin A1c test which averages your blood sugars over the last 3 months returned normal.  No evidence for diabetes or prediabetes.       -Your complete blood counts including your hemoglobin returned normal for you.           Bottom line: The labs look stable.  However, cutting back on sugar, high carbohydrate foods can help with the fasting blood sugar.  Increasing physical activity can help with this as well.  I would encourage you to do these things.  Unfortunately, the insurance rejected the prior authorization for the Zepbound weight loss medication.      Follow up:  Schedule an appointment for a preventive examination in one year's time, or return sooner if new questions, symptoms or problems arise.        Sincerely,    Dr. Mathur

## 2024-09-18 ENCOUNTER — ANCILLARY PROCEDURE (OUTPATIENT)
Dept: ULTRASOUND IMAGING | Facility: CLINIC | Age: 64
End: 2024-09-18
Attending: INTERNAL MEDICINE
Payer: COMMERCIAL

## 2024-09-18 ENCOUNTER — TELEPHONE (OUTPATIENT)
Dept: FAMILY MEDICINE | Facility: CLINIC | Age: 64
End: 2024-09-18
Payer: COMMERCIAL

## 2024-09-18 DIAGNOSIS — Z13.6 ENCOUNTER FOR ABDOMINAL AORTIC ANEURYSM (AAA) SCREENING: ICD-10-CM

## 2024-09-18 PROCEDURE — 76706 US ABDL AORTA SCREEN AAA: CPT

## 2024-09-18 NOTE — RESULT ENCOUNTER NOTE
The following letter pertains to your most recent diagnostic tests:    -Your abdominal aortic aneurysm screen ultrasound did NOT show evidence for abdominal aortic aneurysm.       Sincerely,    Dr. Mathur

## 2024-09-18 NOTE — TELEPHONE ENCOUNTER
Forms/Letter Request    Type of form/letter: OTHER: Physician Result Form      Do we have the form/letter: Yes: Black basket behind     Who is the form from? Patient    Where did/will the form come from? Patient or family brought in       When is form/letter needed by: 11/30/2024    How would you like the form/letter returned:     Patient Notified form requests are processed in 5-7 business days:Yes    Could we send this information to you in [facility] or would you prefer to receive a phone call?:   Patient would prefer a phone call     Okay to leave a detailed message?: Yes at Cell number on file:    Telephone Information:   Mobile [phone]

## 2024-09-20 NOTE — TELEPHONE ENCOUNTER
Completed forms emailed to Pt per his request,aware may not be secure.  Copy sent to [facility] and placed in [employer] accordion file

## 2024-10-03 ENCOUNTER — TELEPHONE (OUTPATIENT)
Dept: GASTROENTEROLOGY | Facility: CLINIC | Age: 64
End: 2024-10-03
Payer: COMMERCIAL

## 2024-10-03 ENCOUNTER — HOSPITAL ENCOUNTER (OUTPATIENT)
Facility: CLINIC | Age: 64
End: 2024-10-03
Attending: INTERNAL MEDICINE | Admitting: INTERNAL MEDICINE
Payer: COMMERCIAL

## 2024-10-03 DIAGNOSIS — Z86.0100 HISTORY OF COLONIC POLYPS: Primary | ICD-10-CM

## 2024-10-03 RX ORDER — BISACODYL 5 MG/1
TABLET, DELAYED RELEASE ORAL
Qty: 4 TABLET | Refills: 0 | Status: SHIPPED | OUTPATIENT
Start: 2024-10-03

## 2024-10-03 NOTE — TELEPHONE ENCOUNTER
"Endoscopy Scheduling Screen    Have you had any respiratory illness or flu-like symptoms in the last 10 days?  No    What is your communication preference for Instructions and/or Bowel Prep?   MyChart    What insurance is in the chart?  Other:  BCBS    Ordering/Referring Provider:     KM VALENCIA      (If ordering provider performs procedure, schedule with ordering provider unless otherwise instructed. )    BMI: Estimated body mass index is 31.9 kg/m  as calculated from the following:    Height as of 8/28/24: 1.829 m (6').    Weight as of 8/28/24: 106.7 kg (235 lb 3.2 oz).     Sedation Ordered  moderate sedation.   If patient BMI > 50 do not schedule in ASC.    If patient BMI > 45 do not schedule at [facility].    Are you taking methadone or Suboxone?  NO, No RN review required.    Have you been diagnosed and are being treated for severe PTSD or severe anxiety?  NO, No RN review required.    Are you taking any prescription medications for pain 3 or more times per week?   NO, No RN review required.    Do you have a history of malignant hyperthermia?  No    (Females) Are you currently pregnant?   No     Have you been diagnosed or told you have pulmonary hypertension?   No    Do you have an LVAD?  No    Have you been told you have moderate to severe sleep apnea?  Yes. Do you use a CPAP? Yes. (RN Review required for scheduling unless scheduling in Hospital.)     Have you been told you have COPD, asthma, or any other lung disease?  No    Do you have any heart conditions?  No     Have you ever had or are you waiting for an organ transplant?  No. Continue scheduling, no site restrictions.    Have you had a stroke or transient ischemic attack (TIA aka \"mini stroke\" in the last 6 months?   No    Have you been diagnosed with or been told you have cirrhosis of the liver?   No.    Are you currently on dialysis?   No    Do you need assistance transferring?   No    BMI: Estimated body mass index is 31.9 kg/m  as calculated from the " following:    Height as of 8/28/24: 1.829 m (6').    Weight as of 8/28/24: 106.7 kg (235 lb 3.2 oz).     Is patients BMI > 40 and scheduling location [facility]?  No    Do you take an injectable or oral medication for weight loss or diabetes (excluding insulin)?  No    Do you take the medication Naltrexone?  No    Do you take blood thinners?  No       Prep   Are you currently on dialysis or do you have chronic kidney disease?  No    Do you have a diagnosis of diabetes?  No    Do you have a diagnosis of cystic fibrosis (CF)?  No    On a regular basis do you go 3 -5 days between bowel movements?  No    BMI > 40?  No    Preferred Pharmacy:    [id number] [address] [address]  Phone: [phone] Fax: [phone]      Final Scheduling Details     Procedure scheduled  Colonoscopy    Surgeon:  TAWNYA     Date of procedure:  10/16/2024     Pre-OP / PAC:   No - Not required for this site.    Location  [facility] - Per order.    Sedation   Moderate Sedation - Per order.      Patient Reminders:   You will receive a call from a Nurse to review instructions and health history.  This assessment must be completed prior to your procedure.  Failure to complete the Nurse assessment may result in the procedure being cancelled.      On the day of your procedure, please designate an adult(s) who can drive you home stay with you for the next 24 hours. The medicines used in the exam will make you sleepy. You will not be able to drive.      You cannot take public transportation, ride share services, or non-medical taxi service without a responsible caregiver.  Medical transport services are allowed with the requirement that a responsible caregiver will receive you at your destination.  We require that drivers and caregivers are confirmed prior to your procedure.

## 2024-10-03 NOTE — TELEPHONE ENCOUNTER
Caller: Tank    Reason for Reschedule/Cancellation   (please be detailed, any staff messages or encounters to note?): Patient unable to do original date due to work.      Prior to reschedule please review:  Ordering Provider: Kevan  Sedation Determined: moderate  Does patient have any ASC Exclusions, please identify?: Y RASHID      Notes on Cancelled Procedure:  Procedure: Lower Endoscopy [Colonoscopy]   Date: 10/16/2024  Location: [facility]; [address] [place] [address]   Surgeon: Bran      Rescheduled: Yes,   Procedure: Lower Endoscopy [Colonoscopy]    Date: 10/09/2024   Location: [facility]; [address] [place] [address]    Surgeon: Trinh   Sedation Level Scheduled  moderate ,  Reason for Sedation Level per order   Instructions updated and sent: y     Does patient need PAC or Pre -Op Rescheduled? : no       Did you cancel or rescheduled an EUS procedure? No.

## 2024-10-03 NOTE — TELEPHONE ENCOUNTER
"Pre assessment completed for upcoming procedure.      Procedure details:    Patient scheduled for Colonoscopy on 10/9/24.     Arrival time: 0730. Procedure time 0815    Facility location: [facility]; [address] [place] [address] [place] [address]. Check in location: [address] [facility].     Sedation type: Conscious sedation     Pre op exam needed? No.    Indication for procedure: Hx colon polyps    Not due for repeat polyp surveillance until February 2025 - advised patient to confirm coverage with insurance before proceeding. Patient acknowledged understanding.  2/22/22 colonoscopy report indicates: \"Repeat colonoscopy in 3 years for surveillance\"  Per 8/28/24 office visit notes: \"History of colonic polyps  Reminded him that he is due for a follow-up colonoscopy in February\"    Designated  policy reviewed. Instructed to have someone stay 6 hours post procedure.       Chart review:     Electronic implanted devices? No    Recent diagnosis of diverticulitis within the last 6 weeks?  No      Medication review:    Diabetic? No    Anticoagulants? No    Weight loss medication/injectable? No. Zepbound noted per med list however prior authorization was denied.  Patient confirmed he is not taking any weight loss medications.     Other medication HOLDING recommendations:  N/A      Prep for procedure:     Bowel prep recommendation: Standard Golytely. Bowel prep prescription sent to [id number] - [place], [place] - [address] AT [address] [place] [address]  Due to: scheduled within a week. Zepbound prior auth was denied - is NOT taking any GLP-1 agonists at this time.    Prep instructions sent via [employer]     Patient declined to review procedure prep instructions on the phone. Patient reports he has done this prep in the past so he is familiar.  Advised patient of no corn, popcorn, nuts or foods containing seeds starting now. NPO instructions reviewed.    Patient was instructed to call if any questions or " concerns arise.     Patient verbalized understanding and had no questions or concerns at this time.        Kimberly Gonzalez RN  Endoscopy Procedure Pre Assessment   [phone] option 2

## 2024-10-04 NOTE — TELEPHONE ENCOUNTER
Caller: Tank    Reason for Reschedule/Cancellation   (please be detailed, any staff messages or encounters to note?): Per insurance, needs to be a year after his last one for coverage      Prior to reschedule please review:  Ordering Provider: Mike Mathur  Sedation Determined: Moderate  Does patient have any ASC Exclusions, please identify?: RASHID      Notes on Cancelled Procedure:  Procedure: Lower Endoscopy [Colonoscopy]   Date: 10/9/24  Location: [facility]; [facility] [address] [place] [address]   Surgeon: Trinh      Rescheduled: No, patient will call back       Did you cancel or rescheduled an EUS procedure? No.

## 2024-10-31 ENCOUNTER — OFFICE VISIT (OUTPATIENT)
Dept: URGENT CARE | Facility: URGENT CARE | Age: 64
End: 2024-10-31
Payer: COMMERCIAL

## 2024-10-31 VITALS
HEART RATE: 65 BPM | OXYGEN SATURATION: 99 % | WEIGHT: 246 LBS | SYSTOLIC BLOOD PRESSURE: 155 MMHG | BODY MASS INDEX: 33.36 KG/M2 | DIASTOLIC BLOOD PRESSURE: 80 MMHG | TEMPERATURE: 97 F

## 2024-10-31 DIAGNOSIS — R30.0 DYSURIA: Primary | ICD-10-CM

## 2024-10-31 LAB
ALBUMIN UR-MCNC: NEGATIVE MG/DL
APPEARANCE UR: CLEAR
BILIRUB UR QL STRIP: NEGATIVE
COLOR UR AUTO: YELLOW
GLUCOSE UR STRIP-MCNC: NEGATIVE MG/DL
HGB UR QL STRIP: NEGATIVE
KETONES UR STRIP-MCNC: NEGATIVE MG/DL
LEUKOCYTE ESTERASE UR QL STRIP: NEGATIVE
NITRATE UR QL: NEGATIVE
PH UR STRIP: 6.5 [PH] (ref 5–7)
SP GR UR STRIP: 1.01 (ref 1–1.03)
UROBILINOGEN UR STRIP-ACNC: 0.2 E.U./DL

## 2024-10-31 PROCEDURE — 81003 URINALYSIS AUTO W/O SCOPE: CPT

## 2024-10-31 PROCEDURE — 99213 OFFICE O/P EST LOW 20 MIN: CPT

## 2024-10-31 RX ORDER — PHENAZOPYRIDINE HYDROCHLORIDE 200 MG/1
200 TABLET, FILM COATED ORAL 3 TIMES DAILY PRN
Qty: 9 TABLET | Refills: 0 | Status: SHIPPED | OUTPATIENT
Start: 2024-10-31

## 2024-10-31 NOTE — PROGRESS NOTES
Assessment & Plan:      Problem List Items Addressed This Visit    None  Visit Diagnoses       Dysuria    -  Primary    Relevant Medications    phenazopyridine (PYRIDIUM) 200 MG tablet    Other Relevant Orders    UA Macroscopic with reflex to Microscopic and Culture - Clinic Collect (Completed)          Medical Decision Making    Dysuria, recurrent  Recurrent lower urinary tract symptoms and dysuria.  UA notably without any signs of infection warranting microscopic analysis or culture.  Discussed results with patient and through shared decision making opted for short course of Pyridium for symptomatic relief of lower urinary tract symptoms.  Recommend only short course as he has had any true UTIs in the past.  Recommended following up with primary care physician and/or urology.  Strict return precautions provided to patient.  Patient comfortable with plan as outlined per below and information regarding for Pyridium provided to patient.  - Try pyridium 200 mg up to 3 times per day   - Follow-up with PCP/Urology if symptoms persist    Patient verbalizes understanding of the treatment regimen and will follow up as needed for recheck and evaluation if symptoms worsen or do not improve. Patient states understanding and agreement with the plan.    Rachid Poole MD  Internal Medicine-Pediatrics, PGY-4  [facility]     Subjective:      Darrell Regalado is a 64 year old male here for evaluation of dysuria.    Patient has a history of recurrent UTIs and lower urinary tract symptoms from many years follows with urology.  Has had dysuria for the last week and notably has had no abdominal pain, no flank pain, no fevers, night sweats, or discharge.  Sexually active with his wife and no one else in the remaining STI symptoms.  Feels like this is a similar presentation to his previous UTIs.  He is otherwise in his normal state of health.     The following portions of the patient's history were reviewed and updated as  appropriate: allergies, current medications, and problem list.     Review of Systems  Pertinent items are noted in HPI.  All other systems are negative.    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        Objective:      BP (!) 155/80 (BP Location: Right arm, Patient Position: Sitting, Cuff Size: Adult Large)   Pulse 65   Temp 97  F (36.1  C) (Tympanic)   Wt 111.6 kg (246 lb)   SpO2 99%   BMI 33.36 kg/m    General appearance - alert, well appearing, and in no distress  Mental status - alert, oriented to person, place, and time  Mouth - moist mucous membranes  Chest - breathing comfortably on room air  Heart - appears well-perfused  Skin - no rashes on exposed skin     Lab & Imaging Results    Results for orders placed or performed in visit on 10/31/24 (from the past 24 hours)   UA Macroscopic with reflex to Microscopic and Culture - Clinic Collect    Specimen: Urine, Clean Catch   Result Value Ref Range    Color Urine Yellow Colorless, Straw, Light Yellow, Yellow    Appearance Urine Clear Clear    Glucose Urine Negative Negative mg/dL    Bilirubin Urine Negative Negative    Ketones Urine Negative Negative mg/dL    Specific Gravity Urine 1.015 1.003 - 1.035    Blood Urine Negative Negative    pH Urine 6.5 5.0 - 7.0    Protein Albumin Urine Negative Negative mg/dL    Urobilinogen Urine 0.2 0.2, 1.0 E.U./dL    Nitrite Urine Negative Negative    Leukocyte Esterase Urine Negative Negative    Narrative    Microscopic not indicated     I personally reviewed these results and discussed findings with the  patient.

## 2024-12-26 ENCOUNTER — MYC MEDICAL ADVICE (OUTPATIENT)
Dept: FAMILY MEDICINE | Facility: CLINIC | Age: 64
End: 2024-12-26
Payer: COMMERCIAL

## 2024-12-26 DIAGNOSIS — E66.811 CLASS 1 OBESITY DUE TO EXCESS CALORIES WITH SERIOUS COMORBIDITY AND BODY MASS INDEX (BMI) OF 31.0 TO 31.9 IN ADULT: ICD-10-CM

## 2024-12-26 DIAGNOSIS — E66.09 CLASS 1 OBESITY DUE TO EXCESS CALORIES WITH SERIOUS COMORBIDITY AND BODY MASS INDEX (BMI) OF 31.0 TO 31.9 IN ADULT: ICD-10-CM

## 2024-12-26 DIAGNOSIS — G47.33 OSA (OBSTRUCTIVE SLEEP APNEA): Primary | ICD-10-CM

## 2024-12-26 NOTE — TELEPHONE ENCOUNTER
Please see pt's MyChart message and advise on the next best steps regarding dx for zepbound for insurance to potentially cover it. Thank you!    Tu Huerta RN  [facility]

## 2024-12-27 ENCOUNTER — TELEPHONE (OUTPATIENT)
Dept: FAMILY MEDICINE | Facility: CLINIC | Age: 64
End: 2024-12-27
Payer: COMMERCIAL

## 2024-12-27 NOTE — TELEPHONE ENCOUNTER
Prior Authorization Retail Medication Request    Medication/Dose: ZEPBOUND 5 mg/0.5 ml SOAJ  Diagnosis and ICD code (if different than what is on RX):    New/renewal/insurance change PA/secondary ins. PA:  Previously Tried and Failed:    Rationale:      Insurance   Primary: [employer] [place] [employer]  Insurance ID:  [id number]    Secondary (if applicable):  Insurance ID:      Pharmacy Information (if different than what is on RX)  Name:  [facility]  Phone:  [phone]  Fax:[phone]       PS: Product/service not covered. Please, apply for PA  [phone]      Mel Lugo  Pharmacy Technician  [facility]  [phone]    Thank you

## 2024-12-30 NOTE — TELEPHONE ENCOUNTER
Central Prior Authorization Team   Phone: [phone]    PA Initiation    Medication: ZEPBOUND 5 mg/0.5 ml SOAJ  Insurance Company: [employer] [place] - Phone [phone] Fax [phone]  Pharmacy Filling the Rx:    Filling Pharmacy Phone:    Filling Pharmacy Fax:    Start Date: 12/30/2024

## 2024-12-30 NOTE — TELEPHONE ENCOUNTER
PRIOR AUTHORIZATION DENIED    Medication: ZEPBOUND 5 mg/0.5 ml SOAJ    Denial Date:      Denial Rational: Excluded

## 2025-01-27 DIAGNOSIS — N52.9 MALE ERECTILE DISORDER: ICD-10-CM

## 2025-01-27 RX ORDER — SILDENAFIL 50 MG/1
TABLET, FILM COATED ORAL
Qty: 30 TABLET | Refills: 1 | Status: SHIPPED | OUTPATIENT
Start: 2025-01-27

## 2025-01-27 NOTE — TELEPHONE ENCOUNTER
Patient called in for Rx refills but was unable to give clarification on medication. Call transferred to RN.

## 2025-01-27 NOTE — TELEPHONE ENCOUNTER
Pt called the clinic requesting a refill of sildenafil.   Med pended, routing to refill team for review.    Thank you,  Fanta Juarez RN

## 2025-02-01 ENCOUNTER — TELEPHONE (OUTPATIENT)
Dept: FAMILY MEDICINE | Facility: CLINIC | Age: 65
End: 2025-02-01

## 2025-03-26 ENCOUNTER — OFFICE VISIT (OUTPATIENT)
Dept: URGENT CARE | Facility: URGENT CARE | Age: 65
End: 2025-03-26
Payer: COMMERCIAL

## 2025-03-26 VITALS
TEMPERATURE: 97.1 F | SYSTOLIC BLOOD PRESSURE: 130 MMHG | OXYGEN SATURATION: 100 % | DIASTOLIC BLOOD PRESSURE: 75 MMHG | BODY MASS INDEX: 30.52 KG/M2 | HEART RATE: 54 BPM | RESPIRATION RATE: 16 BRPM | WEIGHT: 225 LBS

## 2025-03-26 DIAGNOSIS — R30.0 DYSURIA: Primary | ICD-10-CM

## 2025-03-26 DIAGNOSIS — N39.0 URINARY TRACT INFECTION WITHOUT HEMATURIA, SITE UNSPECIFIED: ICD-10-CM

## 2025-03-26 LAB
ALBUMIN UR-MCNC: 100 MG/DL
AMORPH CRY #/AREA URNS HPF: ABNORMAL /HPF
APPEARANCE UR: ABNORMAL
BACTERIA #/AREA URNS HPF: ABNORMAL /HPF
BILIRUB UR QL STRIP: NEGATIVE
COLOR UR AUTO: YELLOW
GLUCOSE UR STRIP-MCNC: NEGATIVE MG/DL
HGB UR QL STRIP: NEGATIVE
KETONES UR STRIP-MCNC: NEGATIVE MG/DL
LEUKOCYTE ESTERASE UR QL STRIP: NEGATIVE
MUCOUS THREADS #/AREA URNS LPF: PRESENT /LPF
NITRATE UR QL: NEGATIVE
PH UR STRIP: 6 [PH] (ref 5–7)
RBC #/AREA URNS AUTO: ABNORMAL /HPF
SP GR UR STRIP: 1.02 (ref 1–1.03)
SQUAMOUS #/AREA URNS AUTO: ABNORMAL /LPF
UROBILINOGEN UR STRIP-ACNC: 0.2 E.U./DL
WBC #/AREA URNS AUTO: ABNORMAL /HPF

## 2025-03-26 PROCEDURE — 81001 URINALYSIS AUTO W/SCOPE: CPT | Performed by: FAMILY MEDICINE

## 2025-03-26 PROCEDURE — 3075F SYST BP GE 130 - 139MM HG: CPT | Performed by: FAMILY MEDICINE

## 2025-03-26 PROCEDURE — 99213 OFFICE O/P EST LOW 20 MIN: CPT | Performed by: FAMILY MEDICINE

## 2025-03-26 PROCEDURE — 3078F DIAST BP <80 MM HG: CPT | Performed by: FAMILY MEDICINE

## 2025-03-26 RX ORDER — NITROFURANTOIN 25; 75 MG/1; MG/1
100 CAPSULE ORAL 2 TIMES DAILY
Qty: 14 CAPSULE | Refills: 0 | Status: SHIPPED | OUTPATIENT
Start: 2025-03-26 | End: 2025-04-02

## 2025-03-26 NOTE — PROGRESS NOTES
Assessment & Plan     Dysuria    - UA Macroscopic with reflex to Microscopic and Culture - Clinic Collect  - UA Microscopic with Reflex to Culture    Urinary tract infection without hematuria, site unspecified  UTI  - nitroFURantoin macrocrystal-monohydrate (MACROBID) 100 MG capsule  Dispense: 14 capsule; Refill: 0             No follow-ups on file.    Alban Schwartz MD  [facility] JENNIFER Fu is a 64 year old male who presents to clinic today for the following health issues:  Chief Complaint   Patient presents with    Urgent Care     Pt presents burning and frequent urination, onset 3-4 days.        HPI    Burning and frequent urination  3-4 days.        Review of Systems        Objective    /75   Pulse 54   Temp 97.1  F (36.2  C) (Tympanic)   Resp 16   Wt 102.1 kg (225 lb)   SpO2 100%   BMI 30.52 kg/m    Physical Exam  Vitals and nursing note reviewed.   Constitutional:       Appearance: Normal appearance.   Neurological:      Mental Status: He is alert.

## 2025-04-30 ENCOUNTER — MYC REFILL (OUTPATIENT)
Dept: FAMILY MEDICINE | Facility: CLINIC | Age: 65
End: 2025-04-30
Payer: COMMERCIAL

## 2025-04-30 DIAGNOSIS — B00.1 RECURRENT COLD SORES: ICD-10-CM

## 2025-05-01 RX ORDER — VALACYCLOVIR HYDROCHLORIDE 1 G/1
2000 TABLET, FILM COATED ORAL 2 TIMES DAILY
Qty: 4 TABLET | Refills: 3 | Status: SHIPPED | OUTPATIENT
Start: 2025-05-01

## 2025-07-08 DIAGNOSIS — B00.1 RECURRENT COLD SORES: ICD-10-CM

## 2025-07-08 RX ORDER — VALACYCLOVIR HYDROCHLORIDE 1 G/1
2000 TABLET, FILM COATED ORAL 2 TIMES DAILY
Qty: 4 TABLET | Refills: 2 | Status: SHIPPED | OUTPATIENT
Start: 2025-07-08

## 2025-07-08 NOTE — TELEPHONE ENCOUNTER
Change in pharmacy, medication and new pharmacy pended.     Sonia Nelson RN on 7/8/2025 at 10:16 AM

## 2025-07-23 ENCOUNTER — OFFICE VISIT (OUTPATIENT)
Dept: URGENT CARE | Facility: URGENT CARE | Age: 65
End: 2025-07-23
Payer: COMMERCIAL

## 2025-07-23 VITALS
TEMPERATURE: 97.9 F | WEIGHT: 210.8 LBS | HEIGHT: 72 IN | SYSTOLIC BLOOD PRESSURE: 94 MMHG | HEART RATE: 62 BPM | OXYGEN SATURATION: 99 % | RESPIRATION RATE: 14 BRPM | DIASTOLIC BLOOD PRESSURE: 57 MMHG | BODY MASS INDEX: 28.55 KG/M2

## 2025-07-23 DIAGNOSIS — R30.0 DYSURIA: ICD-10-CM

## 2025-07-23 DIAGNOSIS — R82.90 BAD ODOR OF URINE: ICD-10-CM

## 2025-07-23 DIAGNOSIS — N30.00 ACUTE CYSTITIS WITHOUT HEMATURIA: Primary | ICD-10-CM

## 2025-07-23 LAB
ALBUMIN UR-MCNC: NEGATIVE MG/DL
AMORPH CRY #/AREA URNS HPF: ABNORMAL /HPF
APPEARANCE UR: ABNORMAL
BACTERIA #/AREA URNS HPF: ABNORMAL /HPF
BILIRUB UR QL STRIP: NEGATIVE
COLOR UR AUTO: YELLOW
GLUCOSE UR STRIP-MCNC: NEGATIVE MG/DL
HGB UR QL STRIP: NEGATIVE
KETONES UR STRIP-MCNC: NEGATIVE MG/DL
LEUKOCYTE ESTERASE UR QL STRIP: ABNORMAL
NITRATE UR QL: NEGATIVE
PH UR STRIP: 7 [PH] (ref 5–7)
RBC #/AREA URNS AUTO: ABNORMAL /HPF
SP GR UR STRIP: 1.01 (ref 1–1.03)
SQUAMOUS #/AREA URNS AUTO: ABNORMAL /LPF
UROBILINOGEN UR STRIP-ACNC: 1 E.U./DL
WBC #/AREA URNS AUTO: ABNORMAL /HPF

## 2025-07-23 PROCEDURE — 81001 URINALYSIS AUTO W/SCOPE: CPT | Performed by: NURSE PRACTITIONER

## 2025-07-23 PROCEDURE — 99214 OFFICE O/P EST MOD 30 MIN: CPT | Performed by: NURSE PRACTITIONER

## 2025-07-23 PROCEDURE — 3078F DIAST BP <80 MM HG: CPT | Performed by: NURSE PRACTITIONER

## 2025-07-23 PROCEDURE — 3074F SYST BP LT 130 MM HG: CPT | Performed by: NURSE PRACTITIONER

## 2025-07-23 RX ORDER — NITROFURANTOIN 25; 75 MG/1; MG/1
100 CAPSULE ORAL 2 TIMES DAILY
Qty: 14 CAPSULE | Refills: 0 | Status: SHIPPED | OUTPATIENT
Start: 2025-07-23 | End: 2025-07-30

## 2025-07-23 NOTE — PROGRESS NOTES
Urgent Care Clinic Visit    Chief Complaint   Patient presents with    Urgent Care    UTI     Burning with urination and odorous urine x few days                7/23/2025    10:54 AM   Additional Questions   Roomed by AF   Accompanied by self     Pre-Provider Visit Orders- Urinalysis UA/UC  Patient reports the following symptoms:  possible urinary tract infection (UTI) , discomfort, pain or burning with urination , and foul-smelling urine   Does the patient report any of the following symptoms: has a urinary catheter in place, or unable to void in a specimen cup?  No

## 2025-07-23 NOTE — PROGRESS NOTES
Assessment & Plan       ICD-10-CM    1. Acute cystitis without hematuria  N30.00 nitroFURantoin macrocrystal-monohydrate (MACROBID) 100 MG capsule      2. Dysuria  R30.0 UA Macroscopic with reflex to Microscopic and Culture - Clinic Collect     Urine Microscopic Exam     Urine Culture      3. Bad odor of urine  R82.90 UA Macroscopic with reflex to Microscopic and Culture - Clinic Collect     Urine Microscopic Exam     Urine Culture           MDM:  UTI, Dysuria, Pyelonephritis, Kidney Stone, and Urethritis.  Patient has had dysuria and malodorous urine for 2 days.  He states this is typical for him with UTIs.  His last UTI was in March of this year.  He was treated with Macrobid and did well.  He denies any back pain or fevers giving low suspicion for pyelonephritis.  No blood in his urine or back pain that would indicate kidney stone.  Urine analysis, vitals, and physical examination indicates uncomplicated urinary tract infection.  Will prescribe Macrobid and await culture sensitivities.    Patient Instructions   UTI     Take antibiotic as directed. Alternate Tylenol as needed for fever or discomfort. I recommend cranberry supplements as well. Increase fluids. Monitor for new or worsening symptoms.     You will be treated with nitrofurantoin (Macrobid) until cultures return and will adjust as necessary. Side effects reviewed and discussed.       Patient agreed to the treatment plan and verbalized understanding.     Recent Results (from the past 24 hours)   UA Macroscopic with reflex to Microscopic and Culture - Clinic Collect    Specimen: Urine, Clean Catch   Result Value Ref Range    Color Urine Yellow Colorless, Straw, Light Yellow, Yellow    Appearance Urine Cloudy (A) Clear    Glucose Urine Negative Negative mg/dL    Bilirubin Urine Negative Negative    Ketones Urine Negative Negative mg/dL    Specific Gravity Urine 1.015 1.003 - 1.035    Blood Urine Negative Negative    pH Urine 7.0 5.0 - 7.0    Protein  Albumin Urine Negative Negative mg/dL    Urobilinogen Urine 1.0 0.2, 1.0 E.U./dL    Nitrite Urine Negative Negative    Leukocyte Esterase Urine Moderate (A) Negative   Urine Microscopic Exam   Result Value Ref Range    Bacteria Urine Few (A) None Seen /HPF    RBC Urine 0-2 0-2 /HPF /HPF    WBC Urine 25-50 (A) 0-5 /HPF /HPF    Squamous Epithelials Urine Few (A) None Seen /LPF    Amorphous Crystals Urine Many (A) None Seen /HPF       Subjective     Tank is a 65 year old male who presents to clinic today for the following health issues:  Chief Complaint   Patient presents with    Urgent Care    UTI     Burning with urination and odorous urine x few days      HPI  Patient has had dysuria and malodorous urine for 2 days.  He denies any back pain, abdominal pain, fevers, or other systemic symptoms.  He states he has not taken any medications for symptoms.  His last UTI was in March of this year and was treated with Macrobid.  He states he did well with this treatment.  His previous urine culture indicates Macrobid is sensitive to the bacteria that grows out.  Patient denies any STI concerns.      Review of Systems   All other systems reviewed and are negative.      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  2018-10: Hyperlipidemia LDL goal <100  2018-10: Male erectile disorder  2017-11: RASHID (obstructive sleep apnea)  2014-02: Neck pain  2010-02: CARDIOVASCULAR SCREENING; LDL GOAL LESS THAN 160  2009-02: Lumbago  2009-02: Nonallopathic lesion of sacral region  Other unspecified back disorder      Past Medical History:   Diagnosis Date    Alcohol abuse, unspecified     stopped drinking 2006    Other unspecified back disorder     low back pain    Sleep apnea          Social History     Tobacco Use    Smoking status: Never     Passive exposure: Never    Smokeless tobacco: Never   Substance Use Topics    Alcohol use: No     Comment: sober since 2006           Objective     BP 94/57   Pulse 62   Temp 97.9  F (36.6  C) (Oral)   Resp 14   Ht 1.829 m (6')   Wt 95.6 kg (210 lb 12.8 oz)   SpO2 99%   BMI 28.59 kg/m    Physical Exam  Vitals and nursing note reviewed.   Constitutional:       Appearance: Normal appearance.   HENT:      Head: Normocephalic and atraumatic.   Cardiovascular:      Rate and Rhythm: Normal rate and regular rhythm.      Heart sounds: Normal heart sounds.   Pulmonary:      Effort: Pulmonary effort is normal.      Breath sounds: Normal breath sounds.   Abdominal:      General: Abdomen is flat. Bowel sounds are normal.      Palpations: Abdomen is soft.      Tenderness: There is no abdominal tenderness. There is no right CVA tenderness or left CVA tenderness.   Neurological:      Mental Status: He is alert.              Pavithra Campa NP

## 2025-07-23 NOTE — PATIENT INSTRUCTIONS
UTI     Take antibiotic as directed. Alternate Tylenol as needed for fever or discomfort. I recommend cranberry supplements as well. Increase fluids. Monitor for new or worsening symptoms.     You will be treated with nitrofurantoin (Macrobid) until cultures return and will adjust as necessary. Side effects reviewed and discussed.

## 2025-07-24 LAB
BACTERIA UR CULT: ABNORMAL
BACTERIA UR CULT: ABNORMAL

## 2025-07-30 DIAGNOSIS — E78.5 HYPERLIPIDEMIA LDL GOAL <100: ICD-10-CM

## 2025-07-30 RX ORDER — ROSUVASTATIN CALCIUM 20 MG/1
20 TABLET, COATED ORAL DAILY
Qty: 90 TABLET | Refills: 3 | Status: SHIPPED | OUTPATIENT
Start: 2025-07-30

## 2025-07-30 NOTE — TELEPHONE ENCOUNTER
Medication passed protocol, however, refill RN could not approve because of the medication warning/interaction. Provider, please approve or deny the prescription.    Elis Lopez RN on 7/30/2025 at 10:40 AM

## 2025-08-14 ENCOUNTER — OFFICE VISIT (OUTPATIENT)
Dept: URGENT CARE | Facility: URGENT CARE | Age: 65
End: 2025-08-14
Payer: COMMERCIAL

## 2025-08-14 VITALS
HEART RATE: 58 BPM | WEIGHT: 213 LBS | BODY MASS INDEX: 28.89 KG/M2 | RESPIRATION RATE: 16 BRPM | SYSTOLIC BLOOD PRESSURE: 111 MMHG | OXYGEN SATURATION: 99 % | TEMPERATURE: 97.4 F | DIASTOLIC BLOOD PRESSURE: 69 MMHG

## 2025-08-14 DIAGNOSIS — R82.90 BAD ODOR OF URINE: ICD-10-CM

## 2025-08-14 DIAGNOSIS — N30.00 ACUTE CYSTITIS WITHOUT HEMATURIA: ICD-10-CM

## 2025-08-14 DIAGNOSIS — R30.0 BURNING WITH URINATION: Primary | ICD-10-CM

## 2025-08-14 LAB
ALBUMIN UR-MCNC: NEGATIVE MG/DL
APPEARANCE UR: ABNORMAL
BACTERIA #/AREA URNS HPF: ABNORMAL /HPF
BILIRUB UR QL STRIP: NEGATIVE
COLOR UR AUTO: YELLOW
GLUCOSE UR STRIP-MCNC: NEGATIVE MG/DL
HGB UR QL STRIP: NEGATIVE
KETONES UR STRIP-MCNC: NEGATIVE MG/DL
LEUKOCYTE ESTERASE UR QL STRIP: ABNORMAL
NITRATE UR QL: NEGATIVE
PH UR STRIP: 6.5 [PH] (ref 5–7)
RBC #/AREA URNS AUTO: ABNORMAL /HPF
SP GR UR STRIP: <=1.005 (ref 1–1.03)
SQUAMOUS #/AREA URNS AUTO: ABNORMAL /LPF
UROBILINOGEN UR STRIP-ACNC: 0.2 E.U./DL
WBC #/AREA URNS AUTO: ABNORMAL /HPF
WBC CLUMPS #/AREA URNS HPF: PRESENT /HPF

## 2025-08-14 RX ORDER — NITROFURANTOIN 25; 75 MG/1; MG/1
100 CAPSULE ORAL 2 TIMES DAILY
Qty: 14 CAPSULE | Refills: 0 | Status: SHIPPED | OUTPATIENT
Start: 2025-08-14 | End: 2025-08-21

## 2025-08-16 LAB
BACTERIA UR CULT: ABNORMAL
BACTERIA UR CULT: ABNORMAL

## 2025-08-21 ENCOUNTER — PATIENT OUTREACH (OUTPATIENT)
Dept: GASTROENTEROLOGY | Facility: CLINIC | Age: 65
End: 2025-08-21
Payer: COMMERCIAL

## 2025-08-21 DIAGNOSIS — Z12.11 SPECIAL SCREENING FOR MALIGNANT NEOPLASMS, COLON: Primary | ICD-10-CM

## 2025-08-28 ENCOUNTER — OFFICE VISIT (OUTPATIENT)
Dept: URGENT CARE | Facility: URGENT CARE | Age: 65
End: 2025-08-28
Payer: COMMERCIAL

## (undated) RX ORDER — FENTANYL CITRATE 50 UG/ML
INJECTION, SOLUTION INTRAMUSCULAR; INTRAVENOUS
Status: DISPENSED
Start: 2022-02-02

## (undated) RX ORDER — FENTANYL CITRATE 50 UG/ML
INJECTION, SOLUTION INTRAMUSCULAR; INTRAVENOUS
Status: DISPENSED
Start: 2020-01-06